# Patient Record
Sex: FEMALE | Race: BLACK OR AFRICAN AMERICAN | Employment: FULL TIME | URBAN - METROPOLITAN AREA
[De-identification: names, ages, dates, MRNs, and addresses within clinical notes are randomized per-mention and may not be internally consistent; named-entity substitution may affect disease eponyms.]

---

## 2017-04-11 ENCOUNTER — APPOINTMENT (OUTPATIENT)
Dept: GENERAL RADIOLOGY | Age: 47
DRG: 203 | End: 2017-04-11
Attending: PHYSICIAN ASSISTANT
Payer: OTHER GOVERNMENT

## 2017-04-11 ENCOUNTER — HOSPITAL ENCOUNTER (INPATIENT)
Age: 47
LOS: 3 days | Discharge: HOME | DRG: 203 | End: 2017-04-14
Attending: FAMILY MEDICINE | Admitting: INTERNAL MEDICINE
Payer: OTHER GOVERNMENT

## 2017-04-11 DIAGNOSIS — J45.41 MODERATE PERSISTENT ASTHMA WITH ACUTE EXACERBATION: Primary | ICD-10-CM

## 2017-04-11 PROBLEM — E66.9 OBESITY: Status: ACTIVE | Noted: 2017-04-11

## 2017-04-11 PROBLEM — R73.9 HYPERGLYCEMIA: Status: ACTIVE | Noted: 2017-04-11

## 2017-04-11 PROBLEM — J45.901 ASTHMA WITH ACUTE EXACERBATION: Status: ACTIVE | Noted: 2017-04-11

## 2017-04-11 LAB
ANION GAP BLD CALC-SCNC: 6 MMOL/L (ref 3–18)
BASOPHILS # BLD AUTO: 0 K/UL (ref 0–0.06)
BASOPHILS # BLD: 0 % (ref 0–2)
BUN SERPL-MCNC: 10 MG/DL (ref 7–18)
BUN/CREAT SERPL: 13 (ref 12–20)
CALCIUM SERPL-MCNC: 9 MG/DL (ref 8.5–10.1)
CHLORIDE SERPL-SCNC: 102 MMOL/L (ref 100–108)
CK MB CFR SERPL CALC: 0.5 % (ref 0–4)
CK MB SERPL-MCNC: 1.2 NG/ML (ref 5–25)
CK SERPL-CCNC: 238 U/L (ref 26–192)
CO2 SERPL-SCNC: 30 MMOL/L (ref 21–32)
CREAT SERPL-MCNC: 0.8 MG/DL (ref 0.6–1.3)
D DIMER PPP FEU-MCNC: 0.4 UG/ML(FEU)
DIFFERENTIAL METHOD BLD: NORMAL
EOSINOPHIL # BLD: 0.2 K/UL (ref 0–0.4)
EOSINOPHIL NFR BLD: 3 % (ref 0–5)
ERYTHROCYTE [DISTWIDTH] IN BLOOD BY AUTOMATED COUNT: 13.4 % (ref 11.6–14.5)
EST. AVERAGE GLUCOSE BLD GHB EST-MCNC: 163 MG/DL
GLUCOSE BLD STRIP.AUTO-MCNC: 277 MG/DL (ref 70–110)
GLUCOSE BLD STRIP.AUTO-MCNC: 280 MG/DL (ref 70–110)
GLUCOSE SERPL-MCNC: 212 MG/DL (ref 74–99)
HBA1C MFR BLD: 7.3 % (ref 4.5–5.6)
HCT VFR BLD AUTO: 36.3 % (ref 35–45)
HGB BLD-MCNC: 12.3 G/DL (ref 12–16)
LYMPHOCYTES # BLD AUTO: 29 % (ref 21–52)
LYMPHOCYTES # BLD: 2.6 K/UL (ref 0.9–3.6)
MAGNESIUM SERPL-MCNC: 1.8 MG/DL (ref 1.6–2.6)
MCH RBC QN AUTO: 28.7 PG (ref 24–34)
MCHC RBC AUTO-ENTMCNC: 33.9 G/DL (ref 31–37)
MCV RBC AUTO: 84.8 FL (ref 74–97)
MONOCYTES # BLD: 0.5 K/UL (ref 0.05–1.2)
MONOCYTES NFR BLD AUTO: 5 % (ref 3–10)
NEUTS SEG # BLD: 5.5 K/UL (ref 1.8–8)
NEUTS SEG NFR BLD AUTO: 63 % (ref 40–73)
PHOSPHATE SERPL-MCNC: 2.7 MG/DL (ref 2.5–4.9)
PLATELET # BLD AUTO: 234 K/UL (ref 135–420)
PMV BLD AUTO: 10.1 FL (ref 9.2–11.8)
POTASSIUM SERPL-SCNC: 3.9 MMOL/L (ref 3.5–5.5)
RBC # BLD AUTO: 4.28 M/UL (ref 4.2–5.3)
SODIUM SERPL-SCNC: 138 MMOL/L (ref 136–145)
TROPONIN I SERPL-MCNC: <0.02 NG/ML (ref 0–0.06)
WBC # BLD AUTO: 8.8 K/UL (ref 4.6–13.2)

## 2017-04-11 PROCEDURE — 74011250636 HC RX REV CODE- 250/636: Performed by: INTERNAL MEDICINE

## 2017-04-11 PROCEDURE — 94640 AIRWAY INHALATION TREATMENT: CPT

## 2017-04-11 PROCEDURE — 85379 FIBRIN DEGRADATION QUANT: CPT | Performed by: PHYSICIAN ASSISTANT

## 2017-04-11 PROCEDURE — 96375 TX/PRO/DX INJ NEW DRUG ADDON: CPT

## 2017-04-11 PROCEDURE — 82962 GLUCOSE BLOOD TEST: CPT

## 2017-04-11 PROCEDURE — 77030013140 HC MSK NEB VYRM -A

## 2017-04-11 PROCEDURE — 82550 ASSAY OF CK (CPK): CPT | Performed by: PHYSICIAN ASSISTANT

## 2017-04-11 PROCEDURE — 74011636637 HC RX REV CODE- 636/637: Performed by: INTERNAL MEDICINE

## 2017-04-11 PROCEDURE — 93005 ELECTROCARDIOGRAM TRACING: CPT

## 2017-04-11 PROCEDURE — 74011000250 HC RX REV CODE- 250: Performed by: FAMILY MEDICINE

## 2017-04-11 PROCEDURE — 65660000000 HC RM CCU STEPDOWN

## 2017-04-11 PROCEDURE — 99285 EMERGENCY DEPT VISIT HI MDM: CPT

## 2017-04-11 PROCEDURE — 74011250636 HC RX REV CODE- 250/636: Performed by: PHYSICIAN ASSISTANT

## 2017-04-11 PROCEDURE — 80048 BASIC METABOLIC PNL TOTAL CA: CPT | Performed by: PHYSICIAN ASSISTANT

## 2017-04-11 PROCEDURE — 84100 ASSAY OF PHOSPHORUS: CPT | Performed by: INTERNAL MEDICINE

## 2017-04-11 PROCEDURE — 83036 HEMOGLOBIN GLYCOSYLATED A1C: CPT | Performed by: INTERNAL MEDICINE

## 2017-04-11 PROCEDURE — 74011000250 HC RX REV CODE- 250: Performed by: PHYSICIAN ASSISTANT

## 2017-04-11 PROCEDURE — 71010 XR CHEST PORT: CPT

## 2017-04-11 PROCEDURE — 74011000250 HC RX REV CODE- 250: Performed by: INTERNAL MEDICINE

## 2017-04-11 PROCEDURE — 74011250637 HC RX REV CODE- 250/637: Performed by: INTERNAL MEDICINE

## 2017-04-11 PROCEDURE — 85025 COMPLETE CBC W/AUTO DIFF WBC: CPT | Performed by: PHYSICIAN ASSISTANT

## 2017-04-11 PROCEDURE — 77030029184 HC NEB SOLO AERONEB AEPM -A

## 2017-04-11 PROCEDURE — 83735 ASSAY OF MAGNESIUM: CPT | Performed by: INTERNAL MEDICINE

## 2017-04-11 PROCEDURE — 96365 THER/PROPH/DIAG IV INF INIT: CPT

## 2017-04-11 RX ORDER — ENOXAPARIN SODIUM 100 MG/ML
40 INJECTION SUBCUTANEOUS EVERY 24 HOURS
Status: DISCONTINUED | OUTPATIENT
Start: 2017-04-11 | End: 2017-04-14 | Stop reason: HOSPADM

## 2017-04-11 RX ORDER — DEXTROSE 50 % IN WATER (D50W) INTRAVENOUS SYRINGE
25-50 AS NEEDED
Status: DISCONTINUED | OUTPATIENT
Start: 2017-04-11 | End: 2017-04-14 | Stop reason: HOSPADM

## 2017-04-11 RX ORDER — ALBUTEROL SULFATE 0.83 MG/ML
5 SOLUTION RESPIRATORY (INHALATION)
Status: COMPLETED | OUTPATIENT
Start: 2017-04-11 | End: 2017-04-11

## 2017-04-11 RX ORDER — IPRATROPIUM BROMIDE AND ALBUTEROL SULFATE 2.5; .5 MG/3ML; MG/3ML
3 SOLUTION RESPIRATORY (INHALATION)
Status: COMPLETED | OUTPATIENT
Start: 2017-04-11 | End: 2017-04-11

## 2017-04-11 RX ORDER — IPRATROPIUM BROMIDE AND ALBUTEROL SULFATE 2.5; .5 MG/3ML; MG/3ML
3 SOLUTION RESPIRATORY (INHALATION)
Status: DISCONTINUED | OUTPATIENT
Start: 2017-04-11 | End: 2017-04-14 | Stop reason: HOSPADM

## 2017-04-11 RX ORDER — ALBUTEROL SULFATE 0.83 MG/ML
10 SOLUTION RESPIRATORY (INHALATION)
Status: COMPLETED | OUTPATIENT
Start: 2017-04-11 | End: 2017-04-11

## 2017-04-11 RX ORDER — ALBUTEROL SULFATE 0.83 MG/ML
2.5 SOLUTION RESPIRATORY (INHALATION)
Status: DISCONTINUED | OUTPATIENT
Start: 2017-04-11 | End: 2017-04-14 | Stop reason: HOSPADM

## 2017-04-11 RX ORDER — MAGNESIUM SULFATE 100 %
4 CRYSTALS MISCELLANEOUS AS NEEDED
Status: DISCONTINUED | OUTPATIENT
Start: 2017-04-11 | End: 2017-04-14 | Stop reason: HOSPADM

## 2017-04-11 RX ORDER — MAGNESIUM SULFATE 100 %
4 CRYSTALS MISCELLANEOUS AS NEEDED
Status: DISCONTINUED | OUTPATIENT
Start: 2017-04-11 | End: 2017-04-11 | Stop reason: SDUPTHER

## 2017-04-11 RX ORDER — INSULIN LISPRO 100 [IU]/ML
INJECTION, SOLUTION INTRAVENOUS; SUBCUTANEOUS
Status: DISCONTINUED | OUTPATIENT
Start: 2017-04-11 | End: 2017-04-11 | Stop reason: SDUPTHER

## 2017-04-11 RX ORDER — ONDANSETRON 2 MG/ML
4 INJECTION INTRAMUSCULAR; INTRAVENOUS
Status: DISCONTINUED | OUTPATIENT
Start: 2017-04-11 | End: 2017-04-14 | Stop reason: HOSPADM

## 2017-04-11 RX ORDER — IPRATROPIUM BROMIDE 0.5 MG/2.5ML
0.5 SOLUTION RESPIRATORY (INHALATION)
Status: COMPLETED | OUTPATIENT
Start: 2017-04-11 | End: 2017-04-11

## 2017-04-11 RX ORDER — IPRATROPIUM BROMIDE AND ALBUTEROL SULFATE 2.5; .5 MG/3ML; MG/3ML
SOLUTION RESPIRATORY (INHALATION)
Status: DISPENSED
Start: 2017-04-11 | End: 2017-04-11

## 2017-04-11 RX ORDER — IPRATROPIUM BROMIDE AND ALBUTEROL SULFATE 2.5; .5 MG/3ML; MG/3ML
3 SOLUTION RESPIRATORY (INHALATION) EVERY 4 HOURS
Status: DISCONTINUED | OUTPATIENT
Start: 2017-04-11 | End: 2017-04-11

## 2017-04-11 RX ORDER — MAGNESIUM SULFATE HEPTAHYDRATE 40 MG/ML
2 INJECTION, SOLUTION INTRAVENOUS ONCE
Status: COMPLETED | OUTPATIENT
Start: 2017-04-11 | End: 2017-04-11

## 2017-04-11 RX ORDER — ALBUTEROL SULFATE 90 UG/1
2 AEROSOL, METERED RESPIRATORY (INHALATION)
Status: ON HOLD | COMMUNITY
End: 2017-04-14

## 2017-04-11 RX ORDER — ACETAMINOPHEN 325 MG/1
650 TABLET ORAL
Status: DISCONTINUED | OUTPATIENT
Start: 2017-04-11 | End: 2017-04-14 | Stop reason: HOSPADM

## 2017-04-11 RX ORDER — INSULIN LISPRO 100 [IU]/ML
INJECTION, SOLUTION INTRAVENOUS; SUBCUTANEOUS
Status: DISCONTINUED | OUTPATIENT
Start: 2017-04-11 | End: 2017-04-13

## 2017-04-11 RX ORDER — DEXTROSE 50 % IN WATER (D50W) INTRAVENOUS SYRINGE
25-50 AS NEEDED
Status: DISCONTINUED | OUTPATIENT
Start: 2017-04-11 | End: 2017-04-11 | Stop reason: SDUPTHER

## 2017-04-11 RX ADMIN — ALBUTEROL SULFATE 10 MG: 2.5 SOLUTION RESPIRATORY (INHALATION) at 12:22

## 2017-04-11 RX ADMIN — METHYLPREDNISOLONE SODIUM SUCCINATE 40 MG: 40 INJECTION, POWDER, FOR SOLUTION INTRAMUSCULAR; INTRAVENOUS at 23:03

## 2017-04-11 RX ADMIN — ENOXAPARIN SODIUM 40 MG: 40 INJECTION SUBCUTANEOUS at 17:38

## 2017-04-11 RX ADMIN — INSULIN LISPRO 6 UNITS: 100 INJECTION, SOLUTION INTRAVENOUS; SUBCUTANEOUS at 17:38

## 2017-04-11 RX ADMIN — ACETAMINOPHEN 650 MG: 325 TABLET, FILM COATED ORAL at 22:22

## 2017-04-11 RX ADMIN — ALBUTEROL SULFATE 5 MG: 2.5 SOLUTION RESPIRATORY (INHALATION) at 11:22

## 2017-04-11 RX ADMIN — IPRATROPIUM BROMIDE AND ALBUTEROL SULFATE 3 ML: .5; 3 SOLUTION RESPIRATORY (INHALATION) at 11:03

## 2017-04-11 RX ADMIN — IPRATROPIUM BROMIDE 0.5 MG: 0.5 SOLUTION RESPIRATORY (INHALATION) at 11:22

## 2017-04-11 RX ADMIN — METHYLPREDNISOLONE SODIUM SUCCINATE 40 MG: 40 INJECTION, POWDER, FOR SOLUTION INTRAMUSCULAR; INTRAVENOUS at 17:38

## 2017-04-11 RX ADMIN — INSULIN LISPRO 6 UNITS: 100 INJECTION, SOLUTION INTRAVENOUS; SUBCUTANEOUS at 22:22

## 2017-04-11 RX ADMIN — IPRATROPIUM BROMIDE AND ALBUTEROL SULFATE 3 ML: .5; 3 SOLUTION RESPIRATORY (INHALATION) at 23:57

## 2017-04-11 RX ADMIN — SODIUM CHLORIDE 500 ML: 900 INJECTION, SOLUTION INTRAVENOUS at 11:30

## 2017-04-11 RX ADMIN — METHYLPREDNISOLONE SODIUM SUCCINATE 125 MG: 125 INJECTION, POWDER, FOR SOLUTION INTRAMUSCULAR; INTRAVENOUS at 11:30

## 2017-04-11 RX ADMIN — MAGNESIUM SULFATE HEPTAHYDRATE 2 G: 40 INJECTION, SOLUTION INTRAVENOUS at 11:30

## 2017-04-11 RX ADMIN — ALBUTEROL SULFATE 5 MG: 2.5 SOLUTION RESPIRATORY (INHALATION) at 14:58

## 2017-04-11 RX ADMIN — IPRATROPIUM BROMIDE AND ALBUTEROL SULFATE 3 ML: .5; 3 SOLUTION RESPIRATORY (INHALATION) at 19:18

## 2017-04-11 NOTE — PROGRESS NOTES
TRANSFER - IN REPORT:    Verbal report received from LILI Mcconnell(name) on Chicho Robertson  being received from ED(unit) for routine progression of care      Report consisted of patients Situation, Background, Assessment and   Recommendations(SBAR). Information from the following report(s) SBAR, Kardex, ED Summary, Procedure Summary, Intake/Output, MAR, Accordion, Recent Results and Med Rec Status was reviewed with the receiving nurse. Opportunity for questions and clarification was provided. Assessment completed upon patients arrival to unit and care assumed. [id number] Assessment completed pt states that she still feels \"chest tightness\", she is complaining of upper sternal chest pain when she takes a deep breath, that she says was present on admission from coughing, but is \"lingering now\", [id number], [id number] Paged Ceci Benítez, pt resting quietly in bed with eyes closed, however states she is still having some discomfort, but it is getting better. Spoke with MD okay to give PRN Tylenol as needed. Shift Summary- Shift uneventful. Pt slept shortly after assessment without difficulty breathing, states that she is feeling a little better, declined PRN Tylenol when offered for \"chest tightness\", but declined.

## 2017-04-11 NOTE — IP AVS SNAPSHOT
[address] [address] 
[phone] Patient: Solange Silverman MRN: [id number] YEC:[id number] You are allergic to the following No active allergies Recent Documentation Height Weight BMI OB Status Smoking Status 1.626 m 81 kg 30.65 kg/m2 Having regular periods Former Smoker Emergency Contacts Name Discharge Info Relation Home Work Mobile N/A  AT THIS TIME [6] About your hospitalization You were admitted on:  April 11, 2017 You last received care in the:  [address] [place] [address] You were discharged on:  April 14, 2017 Unit phone number:  [phone] Why you were hospitalized Your primary diagnosis was:  Asthma With Acute Exacerbation Your diagnoses also included:  Obesity, Hyperglycemia, Type Ii Diabetes Mellitus (Hcc) Providers Seen During Your Hospitalizations Provider Role Specialty Primary office phone Ann Chandra MD Attending Provider Emergency Medicine [phone] Danay Garcia DO Attending Provider Internal Medicine [phone] Your Primary Care Physician (PCP) Primary Care Physician Office Phone Office Fax NONE ** None ** ** None ** Follow-up Information Follow up With Details Comments Contact Info None   None ([phone]) Patient stated that they have no PCP Dr. Joya Gonzalez  In 1 week please make an appointment with her Current Discharge Medication List  
  
START taking these medications Dose & Instructions Dispensing Information Comments Morning Noon Evening Bedtime Diabetic Supplies, [address] Kit Your last dose was: Your next dose is:    
   
   
 Dose:  1 Each  
1 Each by Does Not Apply route daily. Quantity:  1 Kit Refills:  0  
     
   
   
   
  
 metFORMIN 850 mg tablet Commonly known as:  GLUCOPHAGE Your last dose was: Your next dose is:    
   
   
 Dose:  850 mg Take 1 Tab by mouth two (2) times daily (with meals). Quantity:  60 Tab Refills:  0  
     
   
   
   
  
 predniSONE 10 mg dose pack Commonly known as:  STERAPRED DS Your last dose was: Your next dose is:    
   
   
 See administration instruction per 10mg dose pack Quantity:  21 Tab Refills:  0 CONTINUE these medications which have NOT CHANGED Dose & Instructions Dispensing Information Comments Morning Noon Evening Bedtime  
 albuterol 90 mcg/actuation inhaler Commonly known as:  VENTOLIN HFA Your last dose was: Your next dose is:    
   
   
 Dose:  2 Puff Take 2 Puffs by inhalation every six (6) hours as needed for Wheezing. Quantity:  1 Inhaler Refills:  0  
     
   
   
   
  
 carisoprodol 350 mg tablet Commonly known as:  SOMA Your last dose was: Your next dose is:    
   
   
 Dose:  350 mg Take 1 Tab by mouth four (4) times daily. Max Daily Amount: 1,400 mg. Quantity:  20 Tab Refills:  0  
     
   
   
   
  
 multivitamin, tx-iron-ca-min 9 mg iron-400 mcg Tab tablet Commonly known as:  THERA-M w/ IRON Your last dose was: Your next dose is:    
   
   
 Dose:  1 Tab Take 1 Tab by mouth daily. Formulary substitution for DAILY MULTIVITAMIN Refills:  0 Where to Get Your Medications Information on where to get these meds will be given to you by the nurse or doctor. ! Ask your nurse or doctor about these medications  
  albuterol 90 mcg/actuation inhaler Diabetic Supplies, [facility] [address]. Kit  
 metFORMIN 850 mg tablet  
 predniSONE 10 mg dose pack Discharge Instructions Learning About Asthma Triggers What are asthma triggers? When you have asthma, certain things can make your symptoms worse. These are called triggers.  Learn what triggers an asthma attack for you, and avoid the triggers when you can. Common triggers include colds, smoke, air pollution, dust, pollen, pets, stress, and cold air. How do asthma triggers affect you? Triggers can make it harder for your lungs to work as they should. They can lead to sudden breathing problems and other symptoms. When you are around a trigger, an asthma attack is more likely. If your symptoms are severe, you may need emergency treatment or have to go to the hospital for treatment. What can you do to avoid triggers? The first thing is to know your triggers. When you are having symptoms, note the things around you that might be causing them. Then look for patterns that may be triggering your symptoms. Record your triggers on a piece of paper or in an asthma diary. When you have your list of possible triggers, work with your doctor to find ways to avoid them. Avoid colds and flu. Get a pneumococcal vaccine shot. If you have had one before, ask your doctor whether you need a second dose. Get a flu vaccine every year, as soon as it's available. If you must be around people with colds or the flu, wash your hands often. Here are some ways to avoid a few common triggers. · Do not smoke or allow others to smoke around you. If you need help quitting, talk to your doctor about stop-smoking programs and medicines. These can increase your chances of quitting for good. · If there is a lot of pollution, pollen, or dust outside, stay at home and keep your windows closed. Use an air conditioner or air filter in your home. Check your local weather report or newspaper for air quality and pollen reports. What else should you know? · Take your controller medicine every day, not just when you have symptoms. It helps prevent problems before they occur. · Your doctor may suggest that you check how well your lungs are working by measuring your peak expiratory flow (PEF) throughout the day.  Your PEF may drop when you are near things that trigger symptoms. Where can you learn more? Go to http://nadine-pierce.info/. Enter L971 in the search box to learn more about \"Learning About Asthma Triggers. \" Current as of: May 23, 2016 Content Version: 11.2 © 4553-1613 [employer]. Care instructions adapted under license by [employer] (which disclaims liability or warranty for this information). If you have questions about a medical condition or this instruction, always ask your healthcare professional. [address] any warranty or liability for your use of this information. Asthma Attack: Care Instructions Your Care Instructions During an asthma attack, the airways swell and narrow. This makes it hard to breathe. Severe asthma attacks can be life-threatening, but you can help prevent them by keeping your asthma under control and treating symptoms before they get bad. Symptoms include being short of breath, having chest tightness, coughing, and wheezing. Noting and treating these symptoms can also help you avoid future trips to the emergency room. The doctor has checked you carefully, but problems can develop later. If you notice any problems or new symptoms, get medical treatment right away. Follow-up care is a key part of your treatment and safety. Be sure to make and go to all appointments, and call your doctor if you are having problems. It's also a good idea to know your test results and keep a list of the medicines you take. How can you care for yourself at home? · Follow your asthma action plan to prevent and treat attacks. If you don't have an asthma action plan, work with your doctor to create one. · Take your asthma medicines exactly as prescribed. Talk to your doctor right away if you have any questions about how to take them. ¨ Use your quick-relief medicine when you have symptoms of an attack. Quick-relief medicine is usually an albuterol inhaler. Some people need to use quick-relief medicine before they exercise. ¨ Take your controller medicine every day, not just when you have symptoms. Controller medicine is usually an inhaled corticosteroid. The goal is to prevent problems before they occur. Don't use your controller medicine to treat an attack that has already started. It doesn't work fast enough to help. ¨ If your doctor prescribed corticosteroid pills to use during an attack, take them exactly as prescribed. It may take hours for the pills to work, but they may make the episode shorter and help you breathe better. ¨ Keep your quick-relief medicine with you at all times. · Talk to your doctor before using other medicines. Some medicines, such as aspirin, can cause asthma attacks in some people. · If you have a peak flow meter, use it to check how well you are breathing. This can help you predict when an asthma attack is going to occur. Then you can take medicine to prevent the asthma attack or make it less severe. · Do not smoke or allow others to smoke around you. Avoid smoky places. Smoking makes asthma worse. If you need help quitting, talk to your doctor about stop-smoking programs and medicines. These can increase your chances of quitting for good. · Learn what triggers an asthma attack for you, and avoid the triggers when you can. Common triggers include colds, smoke, air pollution, dust, pollen, mold, pets, cockroaches, stress, and cold air. · Avoid colds and the flu. Get a pneumococcal vaccine shot. If you have had one before, ask your doctor if you need a second dose. Get a flu vaccine every fall. If you must be around people with colds or the flu, wash your hands often. When should you call for help? Call 911 anytime you think you may need emergency care. For example, call if: 
· You have severe trouble breathing. Call your doctor now or seek immediate medical care if: · Your symptoms do not get better after you have followed your asthma action plan. · You have new or worse trouble breathing. · Your coughing and wheezing get worse. · You cough up dark brown or bloody mucus (sputum). · You have a new or higher fever. Watch closely for changes in your health, and be sure to contact your doctor if: 
· You need to use quick-relief medicine on more than 2 days a week (unless it is just for exercise). · You cough more deeply or more often, especially if you notice more mucus or a change in the color of your mucus. · You are not getting better as expected. Where can you learn more? Go to http://nadine-pierce.info/. Enter V126 in the search box to learn more about \"Asthma Attack: Care Instructions. \" Current as of: May 23, 2016 Content Version: 11.2 © 8863-3116 [email or web address]. Care instructions adapted under license by [employer] (which disclaims liability or warranty for this information). If you have questions about a medical condition or this instruction, always ask your healthcare professional. [address] any warranty or liability for your use of this information. Learning About Diabetes Food Guidelines Your Care Instructions Meal planning is important to manage diabetes. It helps keep your blood sugar at a target level (which you set with your doctor). You don't have to eat special foods. You can eat what your family eats, including sweets once in a while. But you do have to pay attention to how often you eat and how much you eat of certain foods. You may want to work with a dietitian or a certified diabetes educator (CDE) to help you plan meals and snacks. A dietitian or CDE can also help you lose weight if that is one of your goals. What should you know about eating carbs?  
Managing the amount of carbohydrate (carbs) you eat is an important part of healthy meals when you have diabetes. Carbohydrate is found in many foods. · Learn which foods have carbs. And learn the amounts of carbs in different foods. ¨ Bread, cereal, pasta, and rice have about 15 grams of carbs in a serving. A serving is 1 slice of bread (1 ounce), ½ cup of cooked cereal, or 1/3 cup of cooked pasta or rice. ¨ Fruits have 15 grams of carbs in a serving. A serving is 1 small fresh fruit, such as an apple or orange; ½ of a banana; ½ cup of cooked or canned fruit; ½ cup of fruit juice; 1 cup of melon or raspberries; or 2 tablespoons of dried fruit. ¨ Milk and no-sugar-added yogurt have 15 grams of carbs in a serving. A serving is 1 cup of milk or 2/3 cup of no-sugar-added yogurt. ¨ Starchy vegetables have 15 grams of carbs in a serving. A serving is ½ cup of mashed potatoes or sweet potato; 1 cup winter squash; ½ of a small baked potato; ½ cup of cooked beans; or ½ cup cooked corn or green peas. · Learn how much carbs to eat each day and at each meal. A dietitian or CDE can teach you how to keep track of the amount of carbs you eat. This is called carbohydrate counting. · If you are not sure how to count carbohydrate grams, use the Plate Method to plan meals. It is a good, quick way to make sure that you have a balanced meal. It also helps you spread carbs throughout the day. ¨ Divide your plate by types of foods. Put non-starchy vegetables on half the plate, meat or other protein food on one-quarter of the plate, and a grain or starchy vegetable in the final quarter of the plate. To this you can add a small piece of fruit and 1 cup of milk or yogurt, depending on how many carbs you are supposed to eat at a meal. 
· Try to eat about the same amount of carbs at each meal. Do not \"save up\" your daily allowance of carbs to eat at one meal. 
· Proteins have very little or no carbs per serving.  Examples of proteins are beef, chicken, turkey, fish, eggs, tofu, cheese, cottage cheese, and peanut butter. A serving size of meat is 3 ounces, which is about the size of a deck of cards. Examples of meat substitute serving sizes (equal to 1 ounce of meat) are 1/4 cup of cottage cheese, 1 egg, 1 tablespoon of peanut butter, and ½ cup of tofu. How can you eat out and still eat healthy? · Learn to estimate the serving sizes of foods that have carbohydrate. If you measure food at home, it will be easier to estimate the amount in a serving of restaurant food. · If the meal you order has too much carbohydrate (such as potatoes, corn, or baked beans), ask to have a low-carbohydrate food instead. Ask for a salad or green vegetables. · If you use insulin, check your blood sugar before and after eating out to help you plan how much to eat in the future. · If you eat more carbohydrate at a meal than you had planned, take a walk or do other exercise. This will help lower your blood sugar. What else should you know? · Limit saturated fat, such as the fat from meat and dairy products. This is a healthy choice because people who have diabetes are at higher risk of heart disease. So choose lean cuts of meat and nonfat or low-fat dairy products. Use olive or canola oil instead of butter or shortening when cooking. · Don't skip meals. Your blood sugar may drop too low if you skip meals and take insulin or certain medicines for diabetes. · Check with your doctor before you drink alcohol. Alcohol can cause your blood sugar to drop too low. Alcohol can also cause a bad reaction if you take certain diabetes medicines. Follow-up care is a key part of your treatment and safety. Be sure to make and go to all appointments, and call your doctor if you are having problems. It's also a good idea to know your test results and keep a list of the medicines you take. Where can you learn more? Go to http://nadine-pierce.info/.  
Enter Q651 in the search box to learn more about \"Learning About Diabetes Food Guidelines. \" Current as of: May 23, 2016 Content Version: 11.2 © 1051-6891 [employer]. Care instructions adapted under license by [employer] (which disclaims liability or warranty for this information). If you have questions about a medical condition or this instruction, always ask your healthcare professional. [address] any warranty or liability for your use of this information. Home Blood Glucose Test: About This Test 
What is it? A home blood glucose test measures the amount of a type of sugar, called glucose, in your blood. Why is this test done? People who have diabetes need to check the amount of glucose in their blood. A home blood glucose test is an easy way to test your blood at home or when you are away from home. The results help you know when to take action to keep your blood glucose levels in a target range. How can you prepare for the test? 
· Check the expiration date on the bottle of testing strips. Do not use test strips that have . · Match the code number on the testing strips bottle with the number on the meter. If the numbers do not match, follow the directions with the meter for changing the code number. What happens before the test? 
The supplies you will need for testing blood glucose include: · A blood glucose meter. · Testing strips. These are made to be used with a specific model of meter. · Sugar control solutions. Some meters require a specific solution. Many new meters are made to operate without a control solution. · Short needles called lancets for pricking your skin. · A pen-sized augustin for the lancet (lancet device), which positions the lancet and controls how deeply it goes into your skin. · Clean cotton balls. These are used to stop the bleeding from the testing site.  
What happens during the test? 
A home blood glucose test involves pricking your finger, palm, or forearm with a lancet to collect a drop of blood. The blood drop is placed on a test strip, which you insert into the blood glucose meter. The instructions for testing are slightly different for each blood glucose meter model. Follow the instructions that came with your meter. · Wash your hands with warm, soapy water. Dry them well with a clean towel. You may also use an alcohol wipe to clean your finger or other site, but make sure your hands are dry before the test. 
· Insert a clean lancet into the lancet device. · Remove a test strip from the test strip bottle. Replace the lid immediately to keep moisture away from the other strips. · Follow the instructions that came with your meter to get it ready. · Use the lancet device to stick the side of your fingertip with the lancet. Do not stick the tip of your finger. Some blood sugar meters use lancet devices that take the blood sample from other sites, such as the palm of the hand or the forearm. But the finger is usually the most accurate place to test blood sugar. · Put a drop of blood on the correct spot on the test strip. · Apply pressure with a clean cotton ball to stop the bleeding. · Follow the directions that came with the meter to get the results. · Write down the results and the time that you tested your blood. Some meters will store the results for you. What else should you know about the test? 
The American Diabetes Association (ADA) recommends that you stay within the following blood glucose level ranges. But depending on your health, you and your doctor may set a different range for you. For nonpregnant adults with diabetes: 
· 80 milligrams per deciliter (mg/dL) to 130 mg/dL before a meal 
· Less than 180 mg/dL 1 to 2 hours after a meal 
For women who have diabetes related to pregnancy (gestational diabetes): · 95 mg/dL or less before breakfast 
· 120 to 140 mg/dL (or lower) 1 to 2 hours after a meal 
How long does the test take? · The blood glucose meter will show the results of the test in a minute or less. Where can you learn more? Go to http://nadine-pierce.info/. Enter Z850 in the search box to learn more about \"Home Blood Glucose Test: About This Test.\" Current as of: May 23, 2016 Content Version: 11.2 © 4327-4349 [employer]. Care instructions adapted under license by [employer] (which disclaims liability or warranty for this information). If you have questions about a medical condition or this instruction, always ask your healthcare professional. [address] any warranty or liability for your use of this information. Learning About Type 2 Diabetes What is type 2 diabetes? Insulin is a hormone that helps your body use sugar from your food as energy. Type 2 diabetes happens when your body can't use insulin the right way. Over time, the pancreas can't make enough insulin. If you don't have enough insulin, too much sugar stays in your blood. If you are overweight, get little or no exercise, or have type 2 diabetes in your family, you are more likely to have problems with the way insulin works in your body.  Americans, Hispanics, Native Americans,  Americans, and Pacific Islanders have a higher risk for type 2 diabetes. Type 2 diabetes can be prevented or delayed with a healthy lifestyle, which includes staying at a healthy weight, making smart food choices, and getting regular exercise. What can you expect with type 2 diabetes? Tawnya Vela keep hearing about how important it is to keep your blood sugar within a target range. That's because over time, high blood sugar can lead to serious problems. It can: 
· Harm your eyes, nerves, and kidneys. · Damage your blood vessels, leading to heart disease and stroke. · Reduce blood flow and cause nerve damage to parts of your body, especially your feet. This can cause slow healing and pain when you walk. · Make your immune system weak and less able to fight infections. When people hear the word \"diabetes,\" they often think of problems like these. But daily care and treatment can help prevent or delay these problems. The goal is to keep your blood sugar in a target range. That's the best way to reduce your chance of having more problems from diabetes. What are the symptoms? Some people who have type 2 diabetes may not have any symptoms early on. Many people with the disease don't even know they have it at first. But with time, diabetes starts to cause symptoms. You experience most symptoms of type 2 diabetes when your blood sugar is either too high or too low. The most common symptoms of high blood sugar include: · Thirst. 
· Frequent urination. · Weight loss. · Blurry vision. The symptoms of low blood sugar include: · Sweating. · Shakiness. · Weakness. · Hunger. · Confusion. How can you prevent type 2 diabetes? The best way to prevent or delay type 2 diabetes is to adopt healthy habits, which include: 
· Staying at a healthy weight. · Exercising regularly. · Eating healthy foods. How is type 2 diabetes treated? If you have type 2 diabetes, here are the most important things you can do. · Take your diabetes medicines. · Check your blood sugar as often as your doctor recommends. Also, get a hemoglobin A1c test at least every 6 months. · Try to eat a variety of foods and to spread carbohydrate throughout the day. Carbohydrate raises blood sugar higher and more quickly than any other nutrient does. Carbohydrate is found in sugar, breads and cereals, fruit, starchy vegetables such as potatoes and corn, and milk and yogurt. · Get at least 30 minutes of exercise on most days of the week. Walking is a good choice. You also may want to do other activities, such as running, swimming, cycling, or playing tennis or team sports.  If your doctor says it's okay, do muscle-strengthening exercises at least 2 times a week. · See your doctor for checkups and tests on a regular schedule. · If you have high blood pressure or high cholesterol, take the medicines as prescribed by your doctor. · Do not smoke. Smoking can make health problems worse. This includes problems you might have with type 2 diabetes. If you need help quitting, talk to your doctor about stop-smoking programs and medicines. These can increase your chances of quitting for good. Follow-up care is a key part of your treatment and safety. Be sure to make and go to all appointments, and call your doctor if you are having problems. It's also a good idea to know your test results and keep a list of the medicines you take. Where can you learn more? Go to http://nadine-pierce.info/. Enter R218 in the search box to learn more about \"Learning About Type 2 Diabetes. \" Current as of: May 23, 2016 Content Version: 11.2 © 4401-2318 [employer]. Care instructions adapted under license by [employer] (which disclaims liability or warranty for this information). If you have questions about a medical condition or this instruction, always ask your healthcare professional. [place] [address] any warranty or liability for your use of this information. Lab Results Component Value Date/Time Hemoglobin A1c 7.3 04/11/2017 11:26 AM  
 
 
This lab test reflects that your blood sugar has been slightly elevated over the past 3 months and should be evaluated by your primary care provider. An A1C of 5.7-6.4% meets the criteria for pre-diabetes; an A1C of 6.5% or higher meets the criteria for diabetes. Steroids can increase your blood sugar so it is important to follow up with your provider to determine if your blood sugar has returned to normal or needs further treatment.  
 
This lab test reflects that your blood sugar averaged 163 mg/dl  over the past 3 months. It is important to follow up with your provider on a routine basis to continue to evaluate your blood sugar and discuss any necessary changes in treatment. Discharge Orders None [facility] Announcement We are excited to announce that we are making your provider's discharge notes available to you in [employer]. You will see these notes when they are completed and signed by the physician that discharged you from your recent hospital stay. If you have any questions or concerns about any information you see in SimilarWeb, please call the Health Information Department where you were seen or reach out to your Primary Care Provider for more information about your plan of care. Introducing [facility]! Huey Nieves introduces SimilarWeb patient portal. Now you can access parts of your medical record, email your doctor's office, and request medication refills online. 1. In your internet browser, go to [email or web address] 2. Click on the First Time User? Click Here link in the Sign In box. You will see the New Member Sign Up page. 3. Enter your SimilarWeb Access Code exactly as it appears below. You will not need to use this code after youve completed the sign-up process. If you do not sign up before the expiration date, you must request a new code. · Similar[employer] Access Code: [email or web address] Expires: 7/10/2017 11:33 AM 
 
4. Enter the last four digits of your Social Security Number (xxxx) and Date of Birth (mm/dd/yyyy) as indicated and click Submit. You will be taken to the next sign-up page. 5. Create a SimilarWeb ID. This will be your SimilarWeb login ID and cannot be changed, so think of one that is secure and easy to remember. 6. Create a Similar[employer] password. You can change your password at any time. 7. Enter your Password Reset Question and Answer. This can be used at a later time if you forget your password. 8. Enter your e-mail address. You will receive e-mail notification when new information is available in [address] 9. Click Sign Up. You can now view and download portions of your medical record. 10. Click the Download Summary menu link to download a portable copy of your medical information. If you have questions, please visit the Frequently Asked Questions section of the [employer] website. Remember, [employer] is NOT to be used for urgent needs. For medical emergencies, dial 911. Now available from your iPhone and Android! General Information Please provide this summary of care documentation to your next provider. Patient Signature:  ____________________________________________________________ Date:  ____________________________________________________________  
  
[facility] Provider Signature:  ____________________________________________________________ Date:  ____________________________________________________________

## 2017-04-11 NOTE — PROGRESS NOTES
Admission Medication Reconciliation has been performed on this ED patient consisting of interview of the patient regarding their PTA Home Medication List, Allergies and PMH as well as obtaining outpatient pharmacy information. Interviewed patient who was a good historian. Patient did not provide a written list of home medications. Patient's outpatient pharmacy is [facility]  Smoking status is quit ~ 2 years ago, smoked 1-2 cigs/day  Alcohol use ~ 1 glass of wine weekly  Illicit drug use denies  Patient ABX use within the past 30 days = none  Has patient received any antineoplastics in the past 30 days? na  Has patient received any radiation treatments in the past 45 days?  na      Medication Reconciliation Interventions:   Wrong Medication Identified 0  Wrong/missing medication strength or dose identified  0  Wrong/missing Interval Identified 0  Wrong/missing Route Identified 0  Medication Duplication 0  Omissions 2  Commissions 0  Other Issue(s) Identified (Indicate): 0            Medication Compliance Issues and/or Medication Concerns: 0    Delores Abraham [phone] Pharmacist  [phone]

## 2017-04-11 NOTE — ED TRIAGE NOTES
Patient reports she is having a bad asthma attack. Recent travel returned from [place] two weeks agol  Sepsis Screening completed    (  )Patient meets SIRS criteria. ( x )Patient does not meet SIRS criteria.       SIRS Criteria is achieved when two or more of the following are present   Temperature < 96.8°F (36°C) or > 100.9°F (38.3°C)   Heart Rate > 90 beats per minute   Respiratory Rate > 20 breaths per minute   WBC count > 12,000 or <4,000 or > 10% bands

## 2017-04-11 NOTE — H&P
History & Physical    Patient: Veronica Hilliard MRN: [id number]  CSN: [id number]    YOB: 1970  Age: 42 y.o. Sex: female      DOA: 4/11/2017  Primary Care Provider:  None      Assessment/Plan   1. Acute asthma exacerbation  2. Morbid obesity  3. hyperglycmia      PLAN:  - Admit to medical service with telemetry monitoring  - start scheduled duonebs  - IV solumedrol  - measure peak flows  - IV protonix  - full code      Patient Active Problem List   Diagnosis Code    Asthma with acute exacerbation J45. 0    Obesity E66.9    Hyperglycemia R73.9     HPI:   Jami Velez is a 42 y.o. female with past medical history significant for asthma and obesity presents to the ER with 3-4 days of worsening shortness of breath & wheezing. She recently returned from [place] and following her return her respiatory status worsened. She denies cough, fever, sputum production, nausea or vomiting. On presentation to the ER she was having respiratory difficutly, audible wheezes, tachycardia w o2 sats in mid 90s. CXR clear Medicine is asked to admit for further management. Past Medical History:   Diagnosis Date    Asthma      History reviewed. No pertinent surgical history. Social History   Substance Use Topics    Smoking status: Former Smoker     Quit date: 4/11/2015    Smokeless tobacco: None    Alcohol use Yes     History reviewed. No pertinent family history. No current facility-administered medications on file prior to encounter. Current Outpatient Prescriptions on File Prior to Encounter   Medication Sig Dispense Refill    carisoprodol (SOMA) 350 mg tablet Take 1 Tab by mouth four (4) times daily.  Max Daily Amount: 1,400 mg. 20 Tab 0      No Known Allergies      Review of Systems  Constitutional: No fever, chills, diaphoresis, malaise, fatigue or weight gain/loss or falls  Skin: no itching or rashes  HEENT: no neck stiffness, hearing loss, tinnitus, epistaxis or sore throat  EYES: no blurry vision, double vision or photophobia  CARDIOVASCULAR: no, cp, palpitations, orthopnea, pnd or LE edema  PULMONARY: no cough, +wheeze, +shortness of breath - sputum production  GI: no nausea, vomiting, diarrhea, abdominal pain, melena, hematemesis or brbpr  : no dysuria, hematuria  MUSCULOSKELETAL: no back pain, joint pain or myalgias  ENDOCRINE: no heat/cold intolerance, polyuria or polydipsia  HEME: no easy bruising or bleeding  NEURO: no unilateral weakness, numbness, tingling or seizures      Physical Exam:        Visit Vitals    /90    Pulse (!) 114    Temp 98.1 °F (36.7 °C)    Resp 24    Ht 5' 4\" (1.626 m)    Wt 80.7 kg (178 lb)    LMP 2017    SpO2 100%    BMI 30.55 kg/m2      O2 Device: Room air    Temp (24hrs), Av.1 °F (36.7 °C), Min:98.1 °F (36.7 °C), Max:98.1 °F (36.7 °C)           Body mass index is 30.55 kg/(m^2). General:  Awake, cooperative, no distress. Head:  Normocephalic, without obvious abnormality, atraumatic. Eyes:  Conjunctivae/corneas clear, sclera anicteric, PERRL, EOMs intact. Nose: Nares normal. No drainage or sinus tenderness. Throat: Lips, mucosa, and tongue normal. .   Neck: Supple, symmetrical, trachea midline, no adenopathy. Lungs:    poor air movement, bilateral expiratory wheezes, equal expansion       Heart:  tachycardic2 normal, no murmur, click, rub or gallop, cap refill normal      Abdomen: Soft, non-tender. Bowel sounds normal. No masses,  No organomegaly. Extremities: Extremities normal, atraumatic, no cyanosis or edema. Pulses: 2+ and symmetric all extremities. Skin: Skin color pale, texture, turgor normal. No rashes or lesions   Neurologic: CNII-XII intact. No focal motor or sensory deficit.            Laboratory Studies:    CMP:   Lab Results   Component Value Date/Time     2017 11:24 AM    K 3.9 2017 11:24 AM     2017 11:24 AM    CO2 30 2017 11:24 AM    AGAP 6 04/11/2017 11:24 AM     (H) 04/11/2017 11:24 AM    BUN 10 04/11/2017 11:24 AM    CREA 0.80 04/11/2017 11:24 AM    GFRAA >60 04/11/2017 11:24 AM    GFRNA >60 04/11/2017 11:24 AM    CA 9.0 04/11/2017 11:24 AM    MG 1.8 04/11/2017 11:26 AM    PHOS 2.7 04/11/2017 11:26 AM     CBC:   Lab Results   Component Value Date/Time    WBC 8.8 04/11/2017 11:24 AM    HGB 12.3 04/11/2017 11:24 AM    HCT 36.3 04/11/2017 11:24 AM     04/11/2017 11:24 AM       Imaging studies personally reviewed:  CXR: clear  EKG: sinus tachycardia      Gregor Favre,   Internal Medicine/Geriatrics

## 2017-04-11 NOTE — ROUTINE PROCESS
Bedside and Verbal shift change report given to Yudy Mcdonald RN (oncoming nurse) by Noemi Odell RN (offgoing nurse). Report included the following information SBAR, Kardex, ED Summary, Procedure Summary, Intake/Output, MAR, Accordion, Recent Results and Med Rec Status.

## 2017-04-11 NOTE — ED PROVIDER NOTES
[address]  EMERGENCY DEPARTMENT HISTORY AND PHYSICAL EXAM       Date: 4/11/2017   Patient Name: Basil Luke   YOB: 1970  Medical Record Number: [id number]    History of Presenting Illness     Chief Complaint   Patient presents with    Wheezing        History Provided By: Patient    Additional History:   11:01 AM  Basil Luke is a 42 y.o. female who presents to the emergency department C/O SOB onset yesterday with chest tightness rated 5/10. Pt states her Sx began with sneezing then progressed to difficulty breathing and wheezing today. She reports she has been hospitalized previously for asthma but never intubated and that the last time her asthma was this severe was ~3 years ago. She does not have Albuterol or inhalers at home. No other medical problems. Traveled back from [place] ~2 weeks ago. Pt denies leg pain or swelling, history of DVT/PE and any other Sx or complaints. Primary Care Provider: None   Specialist:    Past History       Past Medical History:   Past Medical History:   Diagnosis Date    Asthma         Past Surgical History:   History reviewed. No pertinent surgical history. Family History:   History reviewed. No pertinent family history. Social History:   Social History   Substance Use Topics    Smoking status: Former Smoker     Quit date: 4/11/2015    Smokeless tobacco: None    Alcohol use Yes        Allergies:   No Known Allergies     Review of Systems   Review of Systems   HENT: Positive for sneezing. Respiratory: Positive for shortness of breath and wheezing. Cardiovascular: Negative for leg swelling. Musculoskeletal:        Denies leg pain   All other systems reviewed and are negative.       Physical Exam  Vitals:    04/14/17 0328 04/14/17 0709 04/14/17 0805 04/14/17 1204   BP: 133/74  138/77    Pulse: 81  75    Resp: 17  17    Temp: 97.7 °F (36.5 °C)  97.3 °F (36.3 °C)    SpO2: 97% 98% 97% 96%   Weight:       Height: Physical Exam   Nursing note and vitals reviewed. Vital signs and nursing notes reviewed. CONSTITUTIONAL: Alert. Nontoxic-appearing, in moderate respiratory distress with audible wheezes, speaking in 2-3 word sentences. HEAD: Normocephalic; atraumatic. EYES: PERRL; Conjunctiva clear. ENT: TM's normal. External ear normal. Normal nose; no rhinorrhea. Normal pharynx. Moist mucus membranes. NECK: Supple; FROM without difficulty, non-tender; no cervical lymphadenopathy. CV: Normal S1, S2; no murmurs, rubs, or gallops. No chest wall tenderness. RESPIRATORY: Moderate respiratory distress with audible wheezes, speaking in 2-3 word sentences. Somewhat diminished breath sounds with expiratory wheezes. EXT: Normal ROM in all four extremities; non-tender to palpation. No edema. No calf tenderness. SKIN: Normal for age and race; warm; dry; good turgor; no apparent lesions or exudate. NEURO: A & O x3. PSYCH:  Mood and affect appropriate. Diagnostic Study Results     Labs -      Recent Results (from the past 12 hour(s))   GLUCOSE, POC    Collection Time: 04/14/17  7:06 AM   Result Value Ref Range    Glucose (POC) 301 (H) 70 - 110 mg/dL   GLUCOSE, POC    Collection Time: 04/14/17  1:03 PM   Result Value Ref Range    Glucose (POC) 214 (H) 70 - 110 mg/dL       Radiologic Studies -  The following have been ordered and reviewed:  XR CHEST PORT   Final Result:  Mildly underexpanded lungs without radiographic evidence of acute abnormality. As read by the radiologist.                  Medical Decision Making   I am the first provider for this patient. I reviewed the vital signs, available nursing notes, past medical history, past surgical history, family history and social history. Vital Signs-Reviewed the patient's vital signs.    Patient Vitals for the past 12 hrs:   Temp Pulse Resp BP SpO2   04/14/17 1204 - - - - 96 %   04/14/17 0805 97.3 °F (36.3 °C) 75 17 138/77 97 %   04/14/17 0709 - - - - 98 %     Pulse Oximetry Analysis - Normal 100% on Duo-Neb     Cardiac Monitor:   Rate: 72 bpm  Rhythm: Sinus Rhythm      EKG interpretation: (Preliminary)  NSR. Rate 78 bpm. Nonspecific T wave abnormality. EKG read by Nicole Melo PA-C at 11:28 AM     Procedures:   Procedures    ED Course:  11:01 AM   Initial assessment performed. The patients presenting problems have been discussed, and they are in agreement with the care plan formulated and outlined with them. I have encouraged them to ask questions as they arise throughout their visit. PROGRESS NOTE:   12:38 PM  Pt has been re-examined by Nicole Melo PA-C. Patient states she is feeling a little bit better. She does not appear to be in distress but is still only speaking in short sentences with diffuse expiratory wheezes; mildly tachypnic, moving more air overall and not hypoxic. She has received 125mg solumedrol, 2g of Mag, and is on her 3rd nebulizer treatment. She is tachycardic, but likely due to albuterol. Will consult hospitalist for admission. Written by Prasad Bryant, ED Scribe, as dictated by Nicole Melo PA-C.      Medications Given in the ED:  Medications   albuterol (PROVENTIL VENTOLIN) nebulizer solution 2.5 mg (not administered)   pantoprazole (PROTONIX) 40 mg in sodium chloride 0.9 % 10 mL injection (40 mg IntraVENous Given 4/14/17 1025)   acetaminophen (TYLENOL) tablet 650 mg (650 mg Oral Given 4/13/17 2201)   ondansetron (ZOFRAN) injection 4 mg (not administered)   enoxaparin (LOVENOX) injection 40 mg (40 mg SubCUTAneous Given 4/13/17 1651)   glucose chewable tablet 16 g (not administered)   glucagon (GLUCAGEN) injection 1 mg (not administered)   dextrose (D50W) injection syrg 12.5-25 g (not administered)   albuterol-ipratropium (DUO-NEB) 2.5 MG-0.5 MG/3 ML (3 mL Nebulization Given 4/14/17 1204)   insulin glargine (LANTUS) injection 16 Units (16 Units SubCUTAneous Given 4/14/17 1025)   methylPREDNISolone (PF) (SOLU-MEDROL) injection 40 mg (40 mg IntraVENous Given 4/14/17 1025)   insulin lispro (HUMALOG) injection (6 Units SubCUTAneous Given 4/14/17 1304)   insulin lispro (HUMALOG) injection 10 Units (10 Units SubCUTAneous Given 4/14/17 1304)   albuterol-ipratropium (DUO-NEB) 2.5 MG-0.5 MG/3 ML (3 mL Nebulization Given 4/11/17 1103)   methylPREDNISolone (PF) (SOLU-MEDROL) injection 125 mg (125 mg IntraVENous Given 4/11/17 1130)   magnesium sulfate 2 g/50 ml IVPB (premix or compounded) (0 g IntraVENous IV Completed 4/11/17 1220)   sodium chloride 0.9 % bolus infusion 500 mL (0 mL IntraVENous IV Completed 4/11/17 1220)   ipratropium (ATROVENT) 0.02 % nebulizer solution 0.5 mg (0.5 mg Nebulization Given 4/11/17 1122)   albuterol (PROVENTIL VENTOLIN) nebulizer solution 5 mg (5 mg Nebulization Given 4/11/17 1122)   albuterol (PROVENTIL VENTOLIN) nebulizer solution 10 mg (10 mg Nebulization Given 4/11/17 1222)   albuterol (PROVENTIL VENTOLIN) nebulizer solution 5 mg (5 mg Nebulization Given 4/11/17 1458)       CONSULT NOTE:   1:12 PM  MARYLU Ladd spoke with Dr. Delgado Chacon  Specialty: Hospitalist  Discussed pt's hx, disposition, and available diagnostic and imaging results. Reviewed care plans. Consulting physician agrees with plans as outlined and will admit to telemetry. Written by MARYLU Ladd .    1:13 PM   Patient is being admitted to the hospital by Gregor Favre, DO. The results of their tests and reasons for their admission have been discussed with them and/or available family. They convey agreement and understanding for the need to be admitted and for their admission diagnosis. CONDITIONS ON ADMISSION:  Deep Vein Thrombosis is not present at the time of admission. Thrombosis is not present at the time of admission. Urinary Tract Infection is not present at the time of admission. Pneumonia is not present at the time of admission. MRSA is not present at the time of admission.  Wound infection is not present at the time of admission. Pressure Ulcer is not present at the time of admission. Critical Care Time:   8:32 PM  I have spent 30 minutes of critical care time involved in lab review, consultations with specialist, family decision-making, and documentation. During this entire length of time I was immediately available to the patient. Critical Care: The reason for providing this level of medical care for this critically ill patient was due a critical illness that impaired one or more vital organ systems such that there was a high probability of imminent or life threatening deterioration in the patients condition. This care involved high complexity decision making to assess, manipulate, and support vital system functions, to treat this degreee vital organ system failure and to prevent further life threatening deterioration of the patients condition. Diagnosis   Clinical Impression:   1. Moderate persistent asthma with acute exacerbation           Follow-up Information     Follow up With Details Comments Contact Info    Jerad Vee MD On 4/19/2017  Follow-up appointment @ 1:30 p.m. [address] [place] [address]  [phone]          _______________________________   Attestations: This note is prepared by Higinio Ordaz, acting as a Scribe for Radha Medina PA-C on 11:01 AM on 4/11/2017 . Radha Medina PA-C: The scribe's documentation has been prepared under my direction and personally reviewed by me in its entirety.   _______________________________

## 2017-04-11 NOTE — IP AVS SNAPSHOT
Current Discharge Medication List  
  
START taking these medications Dose & Instructions Dispensing Information Comments Morning Noon Evening Bedtime Diabetic Supplies, [address] Kit Your last dose was: Your next dose is:    
   
   
 Dose:  1 Each  
1 Each by Does Not Apply route daily. Quantity:  1 Kit Refills:  0  
     
   
   
   
  
 metFORMIN 850 mg tablet Commonly known as:  GLUCOPHAGE Your last dose was: Your next dose is:    
   
   
 Dose:  850 mg Take 1 Tab by mouth two (2) times daily (with meals). Quantity:  60 Tab Refills:  0  
     
   
   
   
  
 predniSONE 10 mg dose pack Commonly known as:  STERAPRED DS Your last dose was: Your next dose is:    
   
   
 See administration instruction per 10mg dose pack Quantity:  21 Tab Refills:  0 CONTINUE these medications which have NOT CHANGED Dose & Instructions Dispensing Information Comments Morning Noon Evening Bedtime  
 albuterol 90 mcg/actuation inhaler Commonly known as:  VENTOLIN HFA Your last dose was: Your next dose is:    
   
   
 Dose:  2 Puff Take 2 Puffs by inhalation every six (6) hours as needed for Wheezing. Quantity:  1 Inhaler Refills:  0  
     
   
   
   
  
 carisoprodol 350 mg tablet Commonly known as:  SOMA Your last dose was: Your next dose is:    
   
   
 Dose:  350 mg Take 1 Tab by mouth four (4) times daily. Max Daily Amount: 1,400 mg. Quantity:  20 Tab Refills:  0  
     
   
   
   
  
 multivitamin, tx-iron-ca-min 9 mg iron-400 mcg Tab tablet Commonly known as:  THERA-M w/ IRON Your last dose was: Your next dose is:    
   
   
 Dose:  1 Tab Take 1 Tab by mouth daily. Formulary substitution for DAILY MULTIVITAMIN Refills:  0 Where to Get Your Medications Information on where to get these meds will be given to you by the nurse or doctor. ! Ask your nurse or doctor about these medications  
  albuterol 90 mcg/actuation inhaler Diabetic Supplies, [address]. Kit  
 metFORMIN 850 mg tablet  
 predniSONE 10 mg dose pack

## 2017-04-11 NOTE — ROUTINE PROCESS
TRANSFER - OUT REPORT:    Verbal report given to LILI Ocampo(name) on Rosy Ards  being transferred to telemetry (unit) for routine progression of care       Report consisted of patients Situation, Background, Assessment and   Recommendations(SBAR). Mews score 4    Information from the following report(s) SBAR, Kardex, ED Summary and MAR was reviewed with the receiving nurse. Lines:       Opportunity for questions and clarification was provided.       Patient transported with:   Monitor  Registered Nurse  Tech

## 2017-04-12 LAB
ANION GAP BLD CALC-SCNC: 10 MMOL/L (ref 3–18)
ATRIAL RATE: 78 BPM
BASOPHILS # BLD AUTO: 0 K/UL (ref 0–0.06)
BASOPHILS # BLD: 0 % (ref 0–2)
BUN SERPL-MCNC: 13 MG/DL (ref 7–18)
BUN/CREAT SERPL: 14 (ref 12–20)
CALCIUM SERPL-MCNC: 8.9 MG/DL (ref 8.5–10.1)
CALCULATED P AXIS, ECG09: 44 DEGREES
CALCULATED R AXIS, ECG10: 19 DEGREES
CALCULATED T AXIS, ECG11: 3 DEGREES
CHLORIDE SERPL-SCNC: 103 MMOL/L (ref 100–108)
CO2 SERPL-SCNC: 24 MMOL/L (ref 21–32)
CREAT SERPL-MCNC: 0.93 MG/DL (ref 0.6–1.3)
DIAGNOSIS, 93000: NORMAL
DIFFERENTIAL METHOD BLD: ABNORMAL
EOSINOPHIL # BLD: 0 K/UL (ref 0–0.4)
EOSINOPHIL NFR BLD: 0 % (ref 0–5)
ERYTHROCYTE [DISTWIDTH] IN BLOOD BY AUTOMATED COUNT: 13.7 % (ref 11.6–14.5)
GLUCOSE BLD STRIP.AUTO-MCNC: 234 MG/DL (ref 70–110)
GLUCOSE BLD STRIP.AUTO-MCNC: 256 MG/DL (ref 70–110)
GLUCOSE BLD STRIP.AUTO-MCNC: 263 MG/DL (ref 70–110)
GLUCOSE SERPL-MCNC: 259 MG/DL (ref 74–99)
HCT VFR BLD AUTO: 33.9 % (ref 35–45)
HGB BLD-MCNC: 11.4 G/DL (ref 12–16)
LYMPHOCYTES # BLD AUTO: 16 % (ref 21–52)
LYMPHOCYTES # BLD: 1.8 K/UL (ref 0.9–3.6)
MCH RBC QN AUTO: 28.6 PG (ref 24–34)
MCHC RBC AUTO-ENTMCNC: 33.6 G/DL (ref 31–37)
MCV RBC AUTO: 85 FL (ref 74–97)
MONOCYTES # BLD: 0.2 K/UL (ref 0.05–1.2)
MONOCYTES NFR BLD AUTO: 2 % (ref 3–10)
NEUTS SEG # BLD: 9.3 K/UL (ref 1.8–8)
NEUTS SEG NFR BLD AUTO: 82 % (ref 40–73)
P-R INTERVAL, ECG05: 164 MS
PLATELET # BLD AUTO: 239 K/UL (ref 135–420)
PMV BLD AUTO: 10.4 FL (ref 9.2–11.8)
POTASSIUM SERPL-SCNC: 4.2 MMOL/L (ref 3.5–5.5)
Q-T INTERVAL, ECG07: 380 MS
QRS DURATION, ECG06: 102 MS
QTC CALCULATION (BEZET), ECG08: 433 MS
RBC # BLD AUTO: 3.99 M/UL (ref 4.2–5.3)
SODIUM SERPL-SCNC: 137 MMOL/L (ref 136–145)
VENTRICULAR RATE, ECG03: 78 BPM
WBC # BLD AUTO: 11.4 K/UL (ref 4.6–13.2)

## 2017-04-12 PROCEDURE — 85025 COMPLETE CBC W/AUTO DIFF WBC: CPT | Performed by: INTERNAL MEDICINE

## 2017-04-12 PROCEDURE — 74011250636 HC RX REV CODE- 250/636: Performed by: HOSPITALIST

## 2017-04-12 PROCEDURE — 74011000250 HC RX REV CODE- 250: Performed by: INTERNAL MEDICINE

## 2017-04-12 PROCEDURE — 94760 N-INVAS EAR/PLS OXIMETRY 1: CPT

## 2017-04-12 PROCEDURE — 82962 GLUCOSE BLOOD TEST: CPT

## 2017-04-12 PROCEDURE — 74011250636 HC RX REV CODE- 250/636: Performed by: INTERNAL MEDICINE

## 2017-04-12 PROCEDURE — 36415 COLL VENOUS BLD VENIPUNCTURE: CPT | Performed by: INTERNAL MEDICINE

## 2017-04-12 PROCEDURE — 94640 AIRWAY INHALATION TREATMENT: CPT

## 2017-04-12 PROCEDURE — C9113 INJ PANTOPRAZOLE SODIUM, VIA: HCPCS | Performed by: INTERNAL MEDICINE

## 2017-04-12 PROCEDURE — 74011250637 HC RX REV CODE- 250/637: Performed by: INTERNAL MEDICINE

## 2017-04-12 PROCEDURE — 65660000000 HC RM CCU STEPDOWN

## 2017-04-12 PROCEDURE — 80048 BASIC METABOLIC PNL TOTAL CA: CPT | Performed by: INTERNAL MEDICINE

## 2017-04-12 PROCEDURE — 74011636637 HC RX REV CODE- 636/637: Performed by: INTERNAL MEDICINE

## 2017-04-12 RX ADMIN — IPRATROPIUM BROMIDE AND ALBUTEROL SULFATE 3 ML: .5; 3 SOLUTION RESPIRATORY (INHALATION) at 20:24

## 2017-04-12 RX ADMIN — INSULIN LISPRO 6 UNITS: 100 INJECTION, SOLUTION INTRAVENOUS; SUBCUTANEOUS at 18:00

## 2017-04-12 RX ADMIN — METHYLPREDNISOLONE SODIUM SUCCINATE 40 MG: 40 INJECTION, POWDER, FOR SOLUTION INTRAMUSCULAR; INTRAVENOUS at 05:53

## 2017-04-12 RX ADMIN — IPRATROPIUM BROMIDE AND ALBUTEROL SULFATE 3 ML: .5; 3 SOLUTION RESPIRATORY (INHALATION) at 16:05

## 2017-04-12 RX ADMIN — ACETAMINOPHEN 650 MG: 325 TABLET, FILM COATED ORAL at 18:51

## 2017-04-12 RX ADMIN — IPRATROPIUM BROMIDE AND ALBUTEROL SULFATE 3 ML: .5; 3 SOLUTION RESPIRATORY (INHALATION) at 12:02

## 2017-04-12 RX ADMIN — METHYLPREDNISOLONE SODIUM SUCCINATE 40 MG: 40 INJECTION, POWDER, FOR SOLUTION INTRAMUSCULAR; INTRAVENOUS at 22:00

## 2017-04-12 RX ADMIN — INSULIN LISPRO 4 UNITS: 100 INJECTION, SOLUTION INTRAVENOUS; SUBCUTANEOUS at 07:21

## 2017-04-12 RX ADMIN — SODIUM CHLORIDE 40 MG: 9 INJECTION INTRAMUSCULAR; INTRAVENOUS; SUBCUTANEOUS at 09:43

## 2017-04-12 RX ADMIN — INSULIN LISPRO 6 UNITS: 100 INJECTION, SOLUTION INTRAVENOUS; SUBCUTANEOUS at 12:59

## 2017-04-12 RX ADMIN — ENOXAPARIN SODIUM 40 MG: 40 INJECTION SUBCUTANEOUS at 16:12

## 2017-04-12 RX ADMIN — INSULIN LISPRO 6 UNITS: 100 INJECTION, SOLUTION INTRAVENOUS; SUBCUTANEOUS at 22:00

## 2017-04-12 RX ADMIN — IPRATROPIUM BROMIDE AND ALBUTEROL SULFATE 3 ML: .5; 3 SOLUTION RESPIRATORY (INHALATION) at 23:15

## 2017-04-12 RX ADMIN — IPRATROPIUM BROMIDE AND ALBUTEROL SULFATE 3 ML: .5; 3 SOLUTION RESPIRATORY (INHALATION) at 07:15

## 2017-04-12 NOTE — PROGRESS NOTES
Peak flow done by pt but with minimal effort as she is very tired and is finally sleeping. She managed to achieve 300 L, while lying down f/b strong wet but non prod. Coughs. When asked if she ever clears from wheezing, she replied, \"Sometimes\".

## 2017-04-12 NOTE — PROGRESS NOTES
Hospitalist Progress Note-critical care note     Patient: Afua Barker MRN: [id number]  CSN: [id number]    YOB: 1970  Age: 42 y.o. Sex: female    DOA: 4/11/2017 LOS:  LOS: 1 day            Chief complaint: asthma exacerbation, hyperglycemia     Assessment/Plan         Patient Active Problem List   Diagnosis Code    Asthma with acute exacerbation J45. 901    Obesity E66.9    Hyperglycemia R73.9     1. Asthma with acute  Exacerbation   Mild improving, will continue breathing tx and iv steroid. Decrease iv steroid to Q8hr ,continue peakflow   2. Hyperglycemia   Due to steroid induce, will continue ssi   3. Obesity   Need life style change    Subjective; wheezing is better  Nurse; no acute issue     Review of systems:    General: No fevers or chills. Cardiovascular: No chest pain or pressure. No palpitations. Pulmonary:  shortness of breath is better   Gastrointestinal: No nausea, vomiting. Vital signs/Intake and Output:  Visit Vitals    /90 (BP 1 Location: Left arm, BP Patient Position: At rest)    Pulse 75    Temp 98 °F (36.7 °C)    Resp 20    Ht 5' 4\" (1.626 m)    Wt 80.6 kg (177 lb 11.1 oz)    SpO2 98%    BMI 30.5 kg/m2     Current Shift:  04/12 0701 - 04/12 1900  In: 240 [P.O.:240]  Out: -   Last three shifts:  04/10 1901 - 04/12 0700  In: 480 [P.O.:480]  Out: 800 [Urine:800]    Physical Exam:  General: WD, WN. Alert, cooperative, no acute distress    HEENT: NC, Atraumatic. PERRLA, anicteric sclerae. Lungs: Mild wheezing   Heart:  Regular  rhythm,  No murmur, No Rubs, No Gallops  Abdomen: Soft, Non distended, Non tender.  +Bowel sounds,   Extremities: No c/c/e  Psych:   Not anxious or agitated. Neurologic:  No acute neurological deficit.              Labs: Results:       Chemistry Recent Labs      04/12/17   0500  04/11/17   1124   GLU  259*  212*   NA  137  138   K  4.2  3.9   CL  103  102   CO2  24  30   BUN  13  10   CREA  0.93  0.80   CA  8.9  9.0   AGAP  10  6 BUCR  14  13      CBC w/Diff Recent Labs      04/12/17   0500  04/11/17   1124   WBC  11.4  8.8   RBC  3.99*  4.28   HGB  11.4*  12.3   HCT  33.9*  36.3   PLT  239  234   GRANS  82*  63   LYMPH  16*  29   EOS  0  3      Cardiac Enzymes Recent Labs      04/11/17   1124   CPK  238*   CKND1  0.5      Coagulation No results for input(s): PTP, INR, APTT in the last 72 hours. No lab exists for component: INREXT    Lipid Panel No results found for: CHOL, CHOLPOCT, CHOLX, CHLST, CHOLV, [id number], HDL, LDL, NLDLCT, DLDL, LDLC, DLDLP, [id number], VLDLC, VLDL, TGL, TGLX, TRIGL, [id number], TRIGP, TGLPOCT, [id number], CHHD, CHHDX   BNP No results for input(s): BNPP in the last 72 hours. Liver Enzymes No results for input(s): TP, ALB, TBIL, AP, SGOT, GPT in the last 72 hours.     No lab exists for component: DBIL   Thyroid Studies Lab Results   Component Value Date/Time    TSH 2.75 07/14/2010 05:15 PM        Procedures/imaging: see electronic medical records for all procedures/Xrays and details which were not copied into this note but were reviewed prior to creation of Kenyon Kennedy MD

## 2017-04-12 NOTE — PROGRESS NOTES
Pt resting in bed, no sign of distress noted. Will continue to monitor. 0430. Asleep. No sign of distress noted. 0730. Bedside and Verbal shift change report given to Zada Merlin, RN (oncoming nurse) by Jimmie Rain RN   (offgoing nurse).  Report included the following information SBAR, ED Summary, Intake/Output, MAR and Recent Results

## 2017-04-12 NOTE — PROGRESS NOTES
Bedside shift change report given to Zahra Garza (oncoming nurse) by Dejon Swartz RN(offgoing nurse). Report included the following information SBAR, Kardex, Intake/Output and MAR.

## 2017-04-12 NOTE — DIABETES MGMT
GLYCEMIC CONTROL PROGRESS NOTE:    -pt admitted for hyperglycemic and acute asthma exacerbation  -pt with no documented h/o T2DM,  A1C 7.3% meets criteria for DM  -IV steroids on board, 40 mg IV q 6 hours  -BG out of range with last 3 POCT >200 mg/dL; pt meets criteria for Glucostablizer (2 BG >180 mg/dL)  TDD = 16 units of corrective coverage  -POCT + corrective coverage orders in place   Recommendation initiate weight based basal/bolus regimen and adjust as needed as steroids are tapered   *Lantus 15 units daily   *Humalog 4 units TID AC   *continue corrective coverage      Recent Glucose Results:   Lab Results   Component Value Date/Time     (H) 04/12/2017 05:00 AM     (H) 04/11/2017 11:24 AM    GLUCPOC 234 (H) 04/12/2017 06:16 AM    GLUCPOC 280 (H) 04/11/2017 09:25 PM    GLUCPOC 277 (H) 04/11/2017 04:37 PM         Olivia Llanos RN, MS  Glycemic Control Team

## 2017-04-12 NOTE — DIABETES MGMT
GLYCEMIC CONTROL AND NUTRITION PROGRESS NOTE:    Assessment/Recommendations:  -pt admitted for hyperglycemic and acute asthma exacerbation  -pt with no documented h/o T2DM, A1C 7.3% meets criteria for DM  -IV steroids on board, 40 mg IV q 6 hours  -BG out of range with last 3 POCT >200 mg/dL; pt meets criteria for Glucostablizer (2 BG >180 mg/dL)  TDD = 16 units of corrective coverage  -POCT + corrective coverage orders in place   Recommendation initiate weight based basal/bolus regimen and adjust as needed as steroids are tapered  *Lantus 15 units daily  *Humalog 4 units TID AC  *continue corrective coverage  -basic education provided (see note)    Subjective:  -pt states per her PCP, h/o prediabetes in the past, along with a strong family h/o T2DM    Goal:  - BG will be in target range of  mg/dl (non-ICU) by 4/16  -PO intake will be 75% of meals offered by 4/16  -pt will follow up with [facility] by 5/4    Most recent blood glucose values:   Lab Results   Component Value Date/Time     (H) 04/12/2017 05:00 AM    GLUCPOC 234 (H) 04/12/2017 06:16 AM    GLUCPOC 280 (H) 04/11/2017 09:25 PM    GLUCPOC 277 (H) 04/11/2017 04:37 PM         Current A1C is equivalent to average blood glucose of 163 mg/dl over the past 2-3 months. Lab Results   Component Value Date/Time    Hemoglobin A1c 7.3 04/11/2017 11:26 AM     Current hospital diabetes medications:   Humalog corrective coverage    Previous day's insulin requirements:   Humalog 16 units corrective coverage    Home diabetes medications:  None     Body mass index is 30.5 kg/(m^2).   Last 3 Recorded Weights in this Encounter    04/11/17 1106 04/12/17 0324   Weight: 80.7 kg (178 lb) 80.6 kg (177 lb 11.1 oz)    Ht Readings from Last 1 Encounters:   04/11/17 5' 4\" (1.626 m)       Diet:    Active Orders   Diet    DIET REGULAR       Intake:   Patient Vitals for the past 100 hrs:   % Diet Eaten   04/12/17 1143 60 %   04/11/17 1855 75 %   04/11/17 1556 0 %       Education:  _x___Refer to Diabetes Education Record             ____Education not indicated at this time        Claudio Nguyen RN, MS

## 2017-04-12 NOTE — PROGRESS NOTES
Readmission Risk Assessment: Low Risk and MSSP/[facility] Help [facility] patients    RRAT Score: 1 - 12 (7)    Initial Assessment: Chart Reviewed. Noted patient admitted to the hospital for further medical management. Care Management to follow up with patient and/or family for transition of care needs prn. Emergency Contact:     Pertinent Medical Hx: see H&P  PCP/Specialists: Community Services:     DME:     Low Risk Care Transition Plan:  1. Evaluate for [facility] or [address] coordination of resources  2. Involve patient/caregiver in assessment, planning, education and implement of intervention. 3. CM daily patient care huddles/interdisciplinary rounds. 4. PCP/Specialist appointment within 7 - 10 days made prior to discharge. 5. Facilitate transportation and logistics for follow-up appointments. 6. Handoff to [address] Nurse Navigator or PCP practice.

## 2017-04-12 NOTE — DIABETES MGMT
.Diabetes Patient/Family Education Record    Factors That  May Influence Patients Ability  to Learn or  Comply With  Recommendations:    []   Language barrier    []   Cultural needs   []   Motivation    []   Cognitive limitation    []   Physical   []   Education    []   Physiological factors   []   Hearing/vision/speaking impairment   []   Quaker beliefs    []   Financial factors   []  Other:   [x]  No factors identified at this time.      Person Instructed:   [x]   Patient   []   Family   []  Other     Preference for Learning:   [x]   Verbal   [x]   Written   []  Demonstration     Level of Comprehension & Competence:    []  Good                                      [x] Fair                                     []  Poor                             [x]  Needs Reinforcement   []  Teachback completed    Education Component: basic overview of DM provided   []  Medication management, including how to administer insulin (if appropriate) and potential medication interactions    [x]  Nutritional management including the role of carbohydrate intake   []  Exercise   []  Signs, symptoms, and treatment of hyperglycemia and hypoglycemia   [] Treatment of hyperglycemia and hypoglycemia   []  Importance of blood glucose monitoring and how to obtain a blood glucose meter    []  Instruction on use of blood glucose meter   [x]  Discuss the importance of HbA1C monitoring and provide patient with  results   []  Sick day guidelines   []  Proper use and disposal of lancets, needles, syringes or insulin pens (if appropriate)   []  Potential long-term complications (retinopathy, kidney disease, neuropathy, heart disease, stroke, vascular disease, foot care)   [] Provide emergency contact number and contact number for more information    [x]  Goal:  Patient/family will demonstrate understanding of Diabetes Self Management Skills by: (date) _5/15  Plan for post-discharge education or self-management support: pt encouraged to follow up with [facility] for diabetes management    [x] Outpatient class schedule provided            [] Patient Declined    [] Scheduled for outpatient classes (date) _______       Ángel Serra RN, MS

## 2017-04-13 PROBLEM — R73.9 HYPERGLYCEMIA: Status: RESOLVED | Noted: 2017-04-11 | Resolved: 2017-04-13

## 2017-04-13 PROBLEM — E11.9 TYPE II DIABETES MELLITUS (HCC): Status: ACTIVE | Noted: 2017-04-13

## 2017-04-13 LAB
ANION GAP BLD CALC-SCNC: 9 MMOL/L (ref 3–18)
BASOPHILS # BLD AUTO: 0 K/UL (ref 0–0.06)
BASOPHILS # BLD: 0 % (ref 0–2)
BUN SERPL-MCNC: 20 MG/DL (ref 7–18)
BUN/CREAT SERPL: 21 (ref 12–20)
CALCIUM SERPL-MCNC: 8.9 MG/DL (ref 8.5–10.1)
CHLORIDE SERPL-SCNC: 103 MMOL/L (ref 100–108)
CO2 SERPL-SCNC: 24 MMOL/L (ref 21–32)
CREAT SERPL-MCNC: 0.96 MG/DL (ref 0.6–1.3)
DIFFERENTIAL METHOD BLD: ABNORMAL
EOSINOPHIL # BLD: 0 K/UL (ref 0–0.4)
EOSINOPHIL NFR BLD: 0 % (ref 0–5)
ERYTHROCYTE [DISTWIDTH] IN BLOOD BY AUTOMATED COUNT: 14.1 % (ref 11.6–14.5)
GLUCOSE BLD STRIP.AUTO-MCNC: 257 MG/DL (ref 70–110)
GLUCOSE BLD STRIP.AUTO-MCNC: 297 MG/DL (ref 70–110)
GLUCOSE BLD STRIP.AUTO-MCNC: 303 MG/DL (ref 70–110)
GLUCOSE BLD STRIP.AUTO-MCNC: 361 MG/DL (ref 70–110)
GLUCOSE SERPL-MCNC: 304 MG/DL (ref 74–99)
HCT VFR BLD AUTO: 34.7 % (ref 35–45)
HGB BLD-MCNC: 11.5 G/DL (ref 12–16)
LYMPHOCYTES # BLD AUTO: 15 % (ref 21–52)
LYMPHOCYTES # BLD: 1.9 K/UL (ref 0.9–3.6)
MCH RBC QN AUTO: 28.5 PG (ref 24–34)
MCHC RBC AUTO-ENTMCNC: 33.1 G/DL (ref 31–37)
MCV RBC AUTO: 86.1 FL (ref 74–97)
MONOCYTES # BLD: 0.6 K/UL (ref 0.05–1.2)
MONOCYTES NFR BLD AUTO: 5 % (ref 3–10)
NEUTS SEG # BLD: 10.1 K/UL (ref 1.8–8)
NEUTS SEG NFR BLD AUTO: 80 % (ref 40–73)
PLATELET # BLD AUTO: 261 K/UL (ref 135–420)
PMV BLD AUTO: 10.4 FL (ref 9.2–11.8)
POTASSIUM SERPL-SCNC: 4.7 MMOL/L (ref 3.5–5.5)
RBC # BLD AUTO: 4.03 M/UL (ref 4.2–5.3)
SODIUM SERPL-SCNC: 136 MMOL/L (ref 136–145)
WBC # BLD AUTO: 12.6 K/UL (ref 4.6–13.2)

## 2017-04-13 PROCEDURE — 74011000250 HC RX REV CODE- 250: Performed by: INTERNAL MEDICINE

## 2017-04-13 PROCEDURE — 74011250636 HC RX REV CODE- 250/636: Performed by: INTERNAL MEDICINE

## 2017-04-13 PROCEDURE — 94640 AIRWAY INHALATION TREATMENT: CPT

## 2017-04-13 PROCEDURE — C9113 INJ PANTOPRAZOLE SODIUM, VIA: HCPCS | Performed by: INTERNAL MEDICINE

## 2017-04-13 PROCEDURE — 85025 COMPLETE CBC W/AUTO DIFF WBC: CPT | Performed by: INTERNAL MEDICINE

## 2017-04-13 PROCEDURE — 74011636637 HC RX REV CODE- 636/637: Performed by: HOSPITALIST

## 2017-04-13 PROCEDURE — 74011250636 HC RX REV CODE- 250/636: Performed by: HOSPITALIST

## 2017-04-13 PROCEDURE — 94760 N-INVAS EAR/PLS OXIMETRY 1: CPT

## 2017-04-13 PROCEDURE — 74011250637 HC RX REV CODE- 250/637: Performed by: INTERNAL MEDICINE

## 2017-04-13 PROCEDURE — 36415 COLL VENOUS BLD VENIPUNCTURE: CPT | Performed by: INTERNAL MEDICINE

## 2017-04-13 PROCEDURE — 65660000000 HC RM CCU STEPDOWN

## 2017-04-13 PROCEDURE — 80048 BASIC METABOLIC PNL TOTAL CA: CPT | Performed by: INTERNAL MEDICINE

## 2017-04-13 PROCEDURE — 74011636637 HC RX REV CODE- 636/637: Performed by: INTERNAL MEDICINE

## 2017-04-13 PROCEDURE — 82962 GLUCOSE BLOOD TEST: CPT

## 2017-04-13 RX ORDER — INSULIN LISPRO 100 [IU]/ML
5 INJECTION, SOLUTION INTRAVENOUS; SUBCUTANEOUS
Status: DISCONTINUED | OUTPATIENT
Start: 2017-04-13 | End: 2017-04-14

## 2017-04-13 RX ORDER — INSULIN GLARGINE 100 [IU]/ML
16 INJECTION, SOLUTION SUBCUTANEOUS DAILY
Status: DISCONTINUED | OUTPATIENT
Start: 2017-04-13 | End: 2017-04-14 | Stop reason: HOSPADM

## 2017-04-13 RX ORDER — INSULIN LISPRO 100 [IU]/ML
INJECTION, SOLUTION INTRAVENOUS; SUBCUTANEOUS
Status: DISCONTINUED | OUTPATIENT
Start: 2017-04-13 | End: 2017-04-14 | Stop reason: HOSPADM

## 2017-04-13 RX ORDER — INSULIN LISPRO 100 [IU]/ML
INJECTION, SOLUTION INTRAVENOUS; SUBCUTANEOUS
Status: DISCONTINUED | OUTPATIENT
Start: 2017-04-13 | End: 2017-04-13

## 2017-04-13 RX ADMIN — INSULIN LISPRO 9 UNITS: 100 INJECTION, SOLUTION INTRAVENOUS; SUBCUTANEOUS at 22:01

## 2017-04-13 RX ADMIN — IPRATROPIUM BROMIDE AND ALBUTEROL SULFATE 3 ML: .5; 3 SOLUTION RESPIRATORY (INHALATION) at 07:05

## 2017-04-13 RX ADMIN — IPRATROPIUM BROMIDE AND ALBUTEROL SULFATE 3 ML: .5; 3 SOLUTION RESPIRATORY (INHALATION) at 23:56

## 2017-04-13 RX ADMIN — SODIUM CHLORIDE 40 MG: 9 INJECTION INTRAMUSCULAR; INTRAVENOUS; SUBCUTANEOUS at 08:47

## 2017-04-13 RX ADMIN — INSULIN GLARGINE 16 UNITS: 100 INJECTION, SOLUTION SUBCUTANEOUS at 09:06

## 2017-04-13 RX ADMIN — INSULIN LISPRO 12 UNITS: 100 INJECTION, SOLUTION INTRAVENOUS; SUBCUTANEOUS at 17:21

## 2017-04-13 RX ADMIN — ENOXAPARIN SODIUM 40 MG: 40 INJECTION SUBCUTANEOUS at 16:51

## 2017-04-13 RX ADMIN — ACETAMINOPHEN 650 MG: 325 TABLET, FILM COATED ORAL at 22:01

## 2017-04-13 RX ADMIN — ACETAMINOPHEN 650 MG: 325 TABLET, FILM COATED ORAL at 08:47

## 2017-04-13 RX ADMIN — INSULIN LISPRO 5 UNITS: 100 INJECTION, SOLUTION INTRAVENOUS; SUBCUTANEOUS at 17:20

## 2017-04-13 RX ADMIN — METHYLPREDNISOLONE SODIUM SUCCINATE 40 MG: 40 INJECTION, POWDER, FOR SOLUTION INTRAMUSCULAR; INTRAVENOUS at 06:56

## 2017-04-13 RX ADMIN — INSULIN LISPRO 5 UNITS: 100 INJECTION, SOLUTION INTRAVENOUS; SUBCUTANEOUS at 09:05

## 2017-04-13 RX ADMIN — INSULIN LISPRO 9 UNITS: 100 INJECTION, SOLUTION INTRAVENOUS; SUBCUTANEOUS at 07:30

## 2017-04-13 RX ADMIN — IPRATROPIUM BROMIDE AND ALBUTEROL SULFATE 3 ML: .5; 3 SOLUTION RESPIRATORY (INHALATION) at 15:27

## 2017-04-13 RX ADMIN — IPRATROPIUM BROMIDE AND ALBUTEROL SULFATE 3 ML: .5; 3 SOLUTION RESPIRATORY (INHALATION) at 20:43

## 2017-04-13 RX ADMIN — IPRATROPIUM BROMIDE AND ALBUTEROL SULFATE 3 ML: .5; 3 SOLUTION RESPIRATORY (INHALATION) at 11:03

## 2017-04-13 RX ADMIN — METHYLPREDNISOLONE SODIUM SUCCINATE 40 MG: 40 INJECTION, POWDER, FOR SOLUTION INTRAMUSCULAR; INTRAVENOUS at 22:01

## 2017-04-13 NOTE — PROGRESS NOTES
Bedside shift change report given to [facility] (oncoming nurse) by Clover Matson RN   (offgoing nurse). Report included the following information SBAR, Kardex, Intake/Output, MAR and Recent Results.

## 2017-04-13 NOTE — PROGRESS NOTES
Chart reviewed, noted 42 yr old female admitted from home for asthma exacerbation. Met with pt during IDR at which time pt reported no PCP and was agreeable to Dr Shannon Hadley checking with Dr Jeffrey Pedro about accepting pt. Met with pt later who reported that: she was employed; she lived alone and would return home at discharge; she did not expect to have any d/c needs. CMgr will be available should d/c needs arise.

## 2017-04-13 NOTE — PROGRESS NOTES
Hospitalist Progress Note-critical care note     Patient: Malia Mcgill MRN: [id number]  CSN: [id number]    YOB: 1970  Age: 42 y.o. Sex: female    DOA: 4/11/2017 LOS:  LOS: 2 days            Chief complaint: asthma exacerbation, DM type II-new     Assessment/Plan         Patient Active Problem List   Diagnosis Code    Asthma with acute exacerbation J45. 0    Obesity E66.9    [facility] E11.9     1. Asthma with acute  Exacerbation   Mild improving, will continue breathing tx and iv steroid. Decrease iv steroid to Q12 hr ,continue peakflow     2. New DM type II  On insulin now, will start po meds on d.c ,     3. Obesity   Need life style change    Subjective; wheezing is improving   Nurse; no acute issue     Review of systems:    General: No fevers or chills. Cardiovascular: No chest pain or pressure. No palpitations. Pulmonary:  shortness of breath is better   Gastrointestinal: No nausea, vomiting. Vital signs/Intake and Output:  Visit Vitals    /72 (BP 1 Location: Right arm, BP Patient Position: At rest;Lying left side)    Pulse 66    Temp 98.2 °F (36.8 °C)    Resp 16    Ht 5' 4\" (1.626 m)    Wt 81 kg (178 lb 9.2 oz)    SpO2 98%    BMI 30.65 kg/m2     Current Shift:  04/13 0701 - 04/13 1900  In: 240 [P.O.:240]  Out: 1000 [Urine:1000]  Last three shifts:  04/11 1901 - 04/13 0700  In: 1200 [P.O.:1200]  Out: 1800 [Urine:1800]    Physical Exam:  General: WD, WN. Alert, cooperative, no acute distress    HEENT: NC, Atraumatic. PERRLA, anicteric sclerae. Lungs: Mild wheezing -improving   Heart:  Regular  rhythm,  No murmur, No Rubs, No Gallops  Abdomen: Soft, Non distended, Non tender.  +Bowel sounds,   Extremities: No c/c/e  Psych:   Not anxious or agitated. Neurologic:  No acute neurological deficit.              Labs: Results:       Chemistry Recent Labs      04/13/17   0213  04/12/17   0500  04/11/17   1124   GLU  304*  259*  212*   NA  136  137  138 K  4.7  4.2  3.9   CL  103  103  102   CO2  24  24  30   BUN  20*  13  10   CREA  0.96  0.93  0.80   CA  8.9  8.9  9.0   AGAP  9  10  6   BUCR  21*  14  13      CBC w/Diff Recent Labs      04/13/17   0213  04/12/17   0500  04/11/17   1124   WBC  12.6  11.4  8.8   RBC  4.03*  3.99*  4.28   HGB  11.5*  11.4*  12.3   HCT  34.7*  33.9*  36.3   PLT  261  239  234   GRANS  80*  82*  63   LYMPH  15*  16*  29   EOS  0  0  3      Cardiac Enzymes Recent Labs      04/11/17   1124   CPK  238*   CKND1  0.5      Coagulation No results for input(s): PTP, INR, APTT in the last 72 hours. No lab exists for component: INREXT, INREXT    Lipid Panel No results found for: CHOL, CHOLPOCT, CHOLX, CHLST, CHOLV, [id number], HDL, LDL, NLDLCT, DLDL, LDLC, DLDLP, [id number], VLDLC, VLDL, TGL, TGLX, TRIGL, [id number], TRIGP, TGLPOCT, [id number], CHHD, CHHDX   BNP No results for input(s): BNPP in the last 72 hours. Liver Enzymes No results for input(s): TP, ALB, TBIL, AP, SGOT, GPT in the last 72 hours.     No lab exists for component: DBIL   Thyroid Studies Lab Results   Component Value Date/Time    TSH 2.75 07/14/2010 05:15 PM        Procedures/imaging: see electronic medical records for all procedures/Xrays and details which were not copied into this note but were reviewed prior to creation of Yazmin Haddad MD

## 2017-04-13 NOTE — DIABETES MGMT
Diabetes Patient/Family Education Record  - new dx, Type 2   - in depth education today   - general overview of all DM, disease process, nutrition, SMBG, hyper + hypo   - glucometer & SMBG, 1x/day in AM prior to breakfast    * pt will need script for glucometer & supplies  - discussed helpful nutrition & diabetes apps for logging & counting carbs   - pt was able to recall and give examples of CHO containing foods and read food label    Factors That  May Influence Patients Ability  to Learn or  Comply With  Recommendations:    []   Language barrier    []   Cultural needs   []   Motivation    []   Cognitive limitation    []   Physical   []   Education    []   Physiological factors   []   Hearing/vision/speaking impairment   []   Yarsanism beliefs    []   Financial factors   []  Other:   [x]  No factors identified at this time.      Person Instructed:   [x]   Patient   []   Family   []  Other     Preference for Learning:   [x]   Verbal   [x]   Written   []  Demonstration     Level of Comprehension & Competence:    []  Good                                      [x] Fair                                     []  Poor                             [x]  Needs Reinforcement   [x]  Teachback completed (see above)    Education Component:   [x]  Medication management, metformin, diet and lifestyle   [x]  Nutritional management including the role of carbohydrate intake, CHO counting   [x]  Exercise   []  Signs, symptoms, and treatment of hyperglycemia and hypoglycemia   [] Treatment of hyperglycemia and hypoglycemia   [x]  Importance of blood glucose monitoring and how to obtain a blood glucose meter    [x]  Instruction on use of blood glucose meter   [x]  Discuss the importance of HbA1C monitoring and provide patient with  results   []  Sick day guidelines   []  Proper use and disposal of lancets, needles, syringes or insulin pens (if appropriate)   [x]  Potential long-term complications (retinopathy, kidney disease, neuropathy, heart disease, stroke, vascular disease, foot care)   [] Provide emergency contact number and contact number for more information    [x]  Goal:  Patient/family will demonstrate understanding of Diabetes Self Management Skills by: (date) _5/1/17____  Plan for post-discharge education or self-management support:    [x] Outpatient class schedule provided            [] Patient Declined    [] Scheduled for outpatient classes (date) _______         Francisco Javier Nuñez.  Emre Blanco, MPH, RD

## 2017-04-13 NOTE — PROGRESS NOTES
3430  Assumed care of pt at this time. Assessment complete. Pt alert and oriented x 4 resting . Denies SOB and chest pain. Pt lungs diminished with slight expiratory wheezing bilaterally. Cap refill less than 3 seconds. Pt denies numbness and tingling to all extremities. Stated pain 0/10. Pt has 20 G IV to r ight hand. Call light and possessions within reach. Bed in low position. Will continue to monitor. 1150  Rounded with Dr Ramana Eubanks. Dr Ramana Eubanks aware pt BS up in 300's due to steroids. Lantus and meal time coverage added. Plan for pt to d/c tom. Shift summary   Pt is alert and oriented x 4. Pt had uneventful shift. Pt ambulating and voiding sufficient amounts.

## 2017-04-13 NOTE — DIABETES MGMT
GLYCEMIC CONTROL AND NUTRITION PROGRESS NOTE:    Assessment/Recommendations:  - new dx, Type 2, basal/bolus insulin orders in place, recommend continue, noted steroids on board  - in depth education today (see note)  - glucometer   * pt will need script for meter   - recommend d/c pt on Metformin 500 mg qd with meal  - Nutrition dx   - altered nutrition-related lab values AEB A1c of 7.3% and new onset-DM diagnosis   - overweight r/t h/o excessive energy intake AEB BMI of 31%  - nutrition & disease knowledge deficit r/t DM AEB pt report of new dx and no previous DM education    - recommend add Consistent CHO to diet (order entered)  - encouraged OP DM Self Management Class (schedule given)    Subjective:  - pt reports no previous h/o DM, has been borderline for years, reports extensive family h/o of DM   - enjoys regular soda, juice, and sweet tea     Goal:  - BG will be in target range of  mg/dl (non-ICU) or 140-180 md/dl (ICU) by 4/17/17  - pt will follow-up with OP PCM re: DM management by 5/1/17  - pt will begin to check BG at home at least 1x/day by 4/21/17  - pt kristy be able to identify CHO containing foods by 4/13/17 (met)  - pt will be able to read a nutrition label by 4/13/17 (met)       Most recent blood glucose values:   Lab Results   Component Value Date/Time     (H) 04/13/2017 02:13 AM    GLUCPOC 361 (H) 04/13/2017 02:13 PM    GLUCPOC 362 (H) 04/13/2017 02:11 PM    GLUCPOC 297 (H) 04/13/2017 06:31 AM             Current A1C is equivalent to average blood glucose of 163 mg/dl over the past 2-3 months.     Lab Results   Component Value Date/Time    Hemoglobin A1c 7.3 04/11/2017 11:26 AM       Current hospital diabetes medications:   - Lantus 16 units every day  - Humalog 5 units qac  - Humalog Normal Insulin Sensitivity Corrective Coverage    Previous day's insulin requirements:   - 22 units total (Humalog, all corrective)    Home diabetes medications:  - n/a, new dx    Estimated Nutrition Needs: 1500 Kcals/day (25 kcal/kg of IBW), Protein (g): 72 g (1.2 g/kg) Fluid (ml): 1500 ml (1 ml/kcal)  Based on:   []          Actual BW    [x]          IBW   []            Adjusted BW        Anthropometrics:   IBW : 59.9 kg (132 lb), % IBW (Calculated): 135.28 %     Body mass index is 30.65 kg/(m^2). Last 3 Recorded Weights in this Encounter    04/12/17 0324 04/13/17 0335 04/13/17 1547   Weight: 80.6 kg (177 lb 11.1 oz) 81 kg (178 lb 9.2 oz) 81 kg (178 lb 9.2 oz)    Ht Readings from Last 1 Encounters:   04/13/17 5' 4\" (1.626 m)       Diet:    Active Orders   Diet    DIET REGULAR       Intake:   Patient Vitals for the past 100 hrs:   % Diet Eaten   04/13/17 1416 75 %   04/13/17 0846 75 %   04/12/17 1624 70 %   04/12/17 1143 60 %   04/11/17 1855 75 %   04/11/17 1556 0 %       Education:  __x__Refer to Diabetes Education Record (4/12 & 4/13)            ____Education not indicated at this time          PETER Hannah, MPH, RD

## 2017-04-13 NOTE — PROGRESS NOTES
Bedside and Verbal shift change report given to Jolanta PEARSON (oncoming nurse) by Gladys Keene RN   (offgoing nurse). Report included the following information SBAR, Kardex and MAR.

## 2017-04-14 VITALS
RESPIRATION RATE: 17 BRPM | HEIGHT: 64 IN | SYSTOLIC BLOOD PRESSURE: 138 MMHG | OXYGEN SATURATION: 96 % | DIASTOLIC BLOOD PRESSURE: 77 MMHG | WEIGHT: 178.57 LBS | BODY MASS INDEX: 30.49 KG/M2 | HEART RATE: 75 BPM | TEMPERATURE: 97.3 F

## 2017-04-14 LAB
ANION GAP BLD CALC-SCNC: 8 MMOL/L (ref 3–18)
BASOPHILS # BLD AUTO: 0 K/UL (ref 0–0.06)
BASOPHILS # BLD: 0 % (ref 0–2)
BUN SERPL-MCNC: 18 MG/DL (ref 7–18)
BUN/CREAT SERPL: 18 (ref 12–20)
CALCIUM SERPL-MCNC: 8.9 MG/DL (ref 8.5–10.1)
CHLORIDE SERPL-SCNC: 102 MMOL/L (ref 100–108)
CO2 SERPL-SCNC: 26 MMOL/L (ref 21–32)
CREAT SERPL-MCNC: 0.99 MG/DL (ref 0.6–1.3)
DIFFERENTIAL METHOD BLD: ABNORMAL
EOSINOPHIL # BLD: 0 K/UL (ref 0–0.4)
EOSINOPHIL NFR BLD: 0 % (ref 0–5)
ERYTHROCYTE [DISTWIDTH] IN BLOOD BY AUTOMATED COUNT: 13.9 % (ref 11.6–14.5)
GLUCOSE BLD STRIP.AUTO-MCNC: 214 MG/DL (ref 70–110)
GLUCOSE BLD STRIP.AUTO-MCNC: 301 MG/DL (ref 70–110)
GLUCOSE BLD STRIP.AUTO-MCNC: 362 MG/DL (ref 70–110)
GLUCOSE SERPL-MCNC: 311 MG/DL (ref 74–99)
HCT VFR BLD AUTO: 34.3 % (ref 35–45)
HGB BLD-MCNC: 11.4 G/DL (ref 12–16)
LYMPHOCYTES # BLD AUTO: 19 % (ref 21–52)
LYMPHOCYTES # BLD: 2 K/UL (ref 0.9–3.6)
MCH RBC QN AUTO: 28.6 PG (ref 24–34)
MCHC RBC AUTO-ENTMCNC: 33.2 G/DL (ref 31–37)
MCV RBC AUTO: 86 FL (ref 74–97)
MONOCYTES # BLD: 0.5 K/UL (ref 0.05–1.2)
MONOCYTES NFR BLD AUTO: 4 % (ref 3–10)
NEUTS SEG # BLD: 8.3 K/UL (ref 1.8–8)
NEUTS SEG NFR BLD AUTO: 77 % (ref 40–73)
PLATELET # BLD AUTO: 256 K/UL (ref 135–420)
PMV BLD AUTO: 10.8 FL (ref 9.2–11.8)
POTASSIUM SERPL-SCNC: 4.3 MMOL/L (ref 3.5–5.5)
RBC # BLD AUTO: 3.99 M/UL (ref 4.2–5.3)
SODIUM SERPL-SCNC: 136 MMOL/L (ref 136–145)
WBC # BLD AUTO: 10.8 K/UL (ref 4.6–13.2)

## 2017-04-14 PROCEDURE — 36415 COLL VENOUS BLD VENIPUNCTURE: CPT | Performed by: INTERNAL MEDICINE

## 2017-04-14 PROCEDURE — 94640 AIRWAY INHALATION TREATMENT: CPT

## 2017-04-14 PROCEDURE — 74011636637 HC RX REV CODE- 636/637: Performed by: HOSPITALIST

## 2017-04-14 PROCEDURE — 74011250636 HC RX REV CODE- 250/636: Performed by: INTERNAL MEDICINE

## 2017-04-14 PROCEDURE — 74011636637 HC RX REV CODE- 636/637: Performed by: INTERNAL MEDICINE

## 2017-04-14 PROCEDURE — 82962 GLUCOSE BLOOD TEST: CPT

## 2017-04-14 PROCEDURE — 85025 COMPLETE CBC W/AUTO DIFF WBC: CPT | Performed by: INTERNAL MEDICINE

## 2017-04-14 PROCEDURE — C9113 INJ PANTOPRAZOLE SODIUM, VIA: HCPCS | Performed by: INTERNAL MEDICINE

## 2017-04-14 PROCEDURE — 74011250636 HC RX REV CODE- 250/636: Performed by: HOSPITALIST

## 2017-04-14 PROCEDURE — 80048 BASIC METABOLIC PNL TOTAL CA: CPT | Performed by: INTERNAL MEDICINE

## 2017-04-14 PROCEDURE — 74011000250 HC RX REV CODE- 250: Performed by: INTERNAL MEDICINE

## 2017-04-14 RX ORDER — ALBUTEROL SULFATE 90 UG/1
2 AEROSOL, METERED RESPIRATORY (INHALATION)
Qty: 1 INHALER | Refills: 0 | Status: SHIPPED | OUTPATIENT
Start: 2017-04-14

## 2017-04-14 RX ORDER — INSULIN LISPRO 100 [IU]/ML
10 INJECTION, SOLUTION INTRAVENOUS; SUBCUTANEOUS
Status: DISCONTINUED | OUTPATIENT
Start: 2017-04-14 | End: 2017-04-14 | Stop reason: HOSPADM

## 2017-04-14 RX ORDER — METFORMIN HYDROCHLORIDE 850 MG/1
850 TABLET ORAL 2 TIMES DAILY WITH MEALS
Qty: 60 TAB | Refills: 0 | Status: SHIPPED | OUTPATIENT
Start: 2017-04-14 | End: 2022-10-21

## 2017-04-14 RX ORDER — PREDNISONE 10 MG/1
TABLET ORAL
Qty: 21 TAB | Refills: 0 | Status: SHIPPED | OUTPATIENT
Start: 2017-04-14 | End: 2017-12-30

## 2017-04-14 RX ADMIN — IPRATROPIUM BROMIDE AND ALBUTEROL SULFATE 3 ML: .5; 3 SOLUTION RESPIRATORY (INHALATION) at 12:04

## 2017-04-14 RX ADMIN — INSULIN LISPRO 12 UNITS: 100 INJECTION, SOLUTION INTRAVENOUS; SUBCUTANEOUS at 07:18

## 2017-04-14 RX ADMIN — INSULIN LISPRO 10 UNITS: 100 INJECTION, SOLUTION INTRAVENOUS; SUBCUTANEOUS at 13:04

## 2017-04-14 RX ADMIN — IPRATROPIUM BROMIDE AND ALBUTEROL SULFATE 3 ML: .5; 3 SOLUTION RESPIRATORY (INHALATION) at 07:09

## 2017-04-14 RX ADMIN — SODIUM CHLORIDE 40 MG: 9 INJECTION INTRAMUSCULAR; INTRAVENOUS; SUBCUTANEOUS at 10:25

## 2017-04-14 RX ADMIN — INSULIN LISPRO 10 UNITS: 100 INJECTION, SOLUTION INTRAVENOUS; SUBCUTANEOUS at 10:24

## 2017-04-14 RX ADMIN — INSULIN LISPRO 6 UNITS: 100 INJECTION, SOLUTION INTRAVENOUS; SUBCUTANEOUS at 13:04

## 2017-04-14 RX ADMIN — INSULIN GLARGINE 16 UNITS: 100 INJECTION, SOLUTION SUBCUTANEOUS at 10:25

## 2017-04-14 RX ADMIN — METHYLPREDNISOLONE SODIUM SUCCINATE 40 MG: 40 INJECTION, POWDER, FOR SOLUTION INTRAMUSCULAR; INTRAVENOUS at 10:25

## 2017-04-14 NOTE — ROUTINE PROCESS
Bedside and Verbal shift change report given to DIONNA De Oliveira RN (oncoming nurse) by DIONNA rider RN (offgoing nurse). Report included the following information SBAR, Kardex, MAR and Recent Results.

## 2017-04-14 NOTE — DISCHARGE INSTRUCTIONS
Learning About Asthma Triggers  What are asthma triggers? When you have asthma, certain things can make your symptoms worse. These are called triggers. Learn what triggers an asthma attack for you, and avoid the triggers when you can. Common triggers include colds, smoke, air pollution, dust, pollen, pets, stress, and cold air. How do asthma triggers affect you? Triggers can make it harder for your lungs to work as they should. They can lead to sudden breathing problems and other symptoms. When you are around a trigger, an asthma attack is more likely. If your symptoms are severe, you may need emergency treatment or have to go to the hospital for treatment. What can you do to avoid triggers? The first thing is to know your triggers. When you are having symptoms, note the things around you that might be causing them. Then look for patterns that may be triggering your symptoms. Record your triggers on a piece of paper or in an asthma diary. When you have your list of possible triggers, work with your doctor to find ways to avoid them. Avoid colds and flu. Get a pneumococcal vaccine shot. If you have had one before, ask your doctor whether you need a second dose. Get a flu vaccine every year, as soon as it's available. If you must be around people with colds or the flu, wash your hands often. Here are some ways to avoid a few common triggers. · Do not smoke or allow others to smoke around you. If you need help quitting, talk to your doctor about stop-smoking programs and medicines. These can increase your chances of quitting for good. · If there is a lot of pollution, pollen, or dust outside, stay at home and keep your windows closed. Use an air conditioner or air filter in your home. Check your local weather report or newspaper for air quality and pollen reports. What else should you know? · Take your controller medicine every day, not just when you have symptoms.  It helps prevent problems before they occur. · Your doctor may suggest that you check how well your lungs are working by measuring your peak expiratory flow (PEF) throughout the day. Your PEF may drop when you are near things that trigger symptoms. Where can you learn more? Go to http://nadine-pierce.info/. Enter O038 in the search box to learn more about \"Learning About Asthma Triggers. \"  Current as of: May 23, 2016  Content Version: 11.2  © 2111-0000 [employer]. Care instructions adapted under license by [employer] (which disclaims liability or warranty for this information). If you have questions about a medical condition or this instruction, always ask your healthcare professional. [address] any warranty or liability for your use of this information. Asthma Attack: Care Instructions  Your Care Instructions    During an asthma attack, the airways swell and narrow. This makes it hard to breathe. Severe asthma attacks can be life-threatening, but you can help prevent them by keeping your asthma under control and treating symptoms before they get bad. Symptoms include being short of breath, having chest tightness, coughing, and wheezing. Noting and treating these symptoms can also help you avoid future trips to the emergency room. The doctor has checked you carefully, but problems can develop later. If you notice any problems or new symptoms, get medical treatment right away. Follow-up care is a key part of your treatment and safety. Be sure to make and go to all appointments, and call your doctor if you are having problems. It's also a good idea to know your test results and keep a list of the medicines you take. How can you care for yourself at home? · Follow your asthma action plan to prevent and treat attacks. If you don't have an asthma action plan, work with your doctor to create one. · Take your asthma medicines exactly as prescribed.  Talk to your doctor right away if you have any questions about how to take them. ¨ Use your quick-relief medicine when you have symptoms of an attack. Quick-relief medicine is usually an albuterol inhaler. Some people need to use quick-relief medicine before they exercise. ¨ Take your controller medicine every day, not just when you have symptoms. Controller medicine is usually an inhaled corticosteroid. The goal is to prevent problems before they occur. Don't use your controller medicine to treat an attack that has already started. It doesn't work fast enough to help. ¨ If your doctor prescribed corticosteroid pills to use during an attack, take them exactly as prescribed. It may take hours for the pills to work, but they may make the episode shorter and help you breathe better. ¨ Keep your quick-relief medicine with you at all times. · Talk to your doctor before using other medicines. Some medicines, such as aspirin, can cause asthma attacks in some people. · If you have a peak flow meter, use it to check how well you are breathing. This can help you predict when an asthma attack is going to occur. Then you can take medicine to prevent the asthma attack or make it less severe. · Do not smoke or allow others to smoke around you. Avoid smoky places. Smoking makes asthma worse. If you need help quitting, talk to your doctor about stop-smoking programs and medicines. These can increase your chances of quitting for good. · Learn what triggers an asthma attack for you, and avoid the triggers when you can. Common triggers include colds, smoke, air pollution, dust, pollen, mold, pets, cockroaches, stress, and cold air. · Avoid colds and the flu. Get a pneumococcal vaccine shot. If you have had one before, ask your doctor if you need a second dose. Get a flu vaccine every fall. If you must be around people with colds or the flu, wash your hands often. When should you call for help?   Call 911 anytime you think you may need emergency care. For example, call if:  · You have severe trouble breathing. Call your doctor now or seek immediate medical care if:  · Your symptoms do not get better after you have followed your asthma action plan. · You have new or worse trouble breathing. · Your coughing and wheezing get worse. · You cough up dark brown or bloody mucus (sputum). · You have a new or higher fever. Watch closely for changes in your health, and be sure to contact your doctor if:  · You need to use quick-relief medicine on more than 2 days a week (unless it is just for exercise). · You cough more deeply or more often, especially if you notice more mucus or a change in the color of your mucus. · You are not getting better as expected. Where can you learn more? Go to http://nadine-pierce.info/. Enter T405 in the search box to learn more about \"Asthma Attack: Care Instructions. \"  Current as of: May 23, 2016  Content Version: 11.2  © 0433-4781 "[employer] Care instructions adapted under license by [employer] (which disclaims liability or warranty for this information). If you have questions about a medical condition or this instruction, always ask your healthcare professional. [address] any warranty or liability for your use of this information. Learning About Diabetes Food Guidelines  Your Care Instructions  Meal planning is important to manage diabetes. It helps keep your blood sugar at a target level (which you set with your doctor). You don't have to eat special foods. You can eat what your family eats, including sweets once in a while. But you do have to pay attention to how often you eat and how much you eat of certain foods. You may want to work with a dietitian or a certified diabetes educator (CDE) to help you plan meals and snacks. A dietitian or CDE can also help you lose weight if that is one of your goals.   What should you know about eating carbs? Managing the amount of carbohydrate (carbs) you eat is an important part of healthy meals when you have diabetes. Carbohydrate is found in many foods. · Learn which foods have carbs. And learn the amounts of carbs in different foods. ¨ Bread, cereal, pasta, and rice have about 15 grams of carbs in a serving. A serving is 1 slice of bread (1 ounce), ½ cup of cooked cereal, or 1/3 cup of cooked pasta or rice. ¨ Fruits have 15 grams of carbs in a serving. A serving is 1 small fresh fruit, such as an apple or orange; ½ of a banana; ½ cup of cooked or canned fruit; ½ cup of fruit juice; 1 cup of melon or raspberries; or 2 tablespoons of dried fruit. ¨ Milk and no-sugar-added yogurt have 15 grams of carbs in a serving. A serving is 1 cup of milk or 2/3 cup of no-sugar-added yogurt. ¨ Starchy vegetables have 15 grams of carbs in a serving. A serving is ½ cup of mashed potatoes or sweet potato; 1 cup winter squash; ½ of a small baked potato; ½ cup of cooked beans; or ½ cup cooked corn or green peas. · Learn how much carbs to eat each day and at each meal. A dietitian or CDE can teach you how to keep track of the amount of carbs you eat. This is called carbohydrate counting. · If you are not sure how to count carbohydrate grams, use the Plate Method to plan meals. It is a good, quick way to make sure that you have a balanced meal. It also helps you spread carbs throughout the day. ¨ Divide your plate by types of foods. Put non-starchy vegetables on half the plate, meat or other protein food on one-quarter of the plate, and a grain or starchy vegetable in the final quarter of the plate. To this you can add a small piece of fruit and 1 cup of milk or yogurt, depending on how many carbs you are supposed to eat at a meal.  · Try to eat about the same amount of carbs at each meal. Do not \"save up\" your daily allowance of carbs to eat at one meal.  · Proteins have very little or no carbs per serving.  Examples of proteins are beef, chicken, turkey, fish, eggs, tofu, cheese, cottage cheese, and peanut butter. A serving size of meat is 3 ounces, which is about the size of a deck of cards. Examples of meat substitute serving sizes (equal to 1 ounce of meat) are 1/4 cup of cottage cheese, 1 egg, 1 tablespoon of peanut butter, and ½ cup of tofu. How can you eat out and still eat healthy? · Learn to estimate the serving sizes of foods that have carbohydrate. If you measure food at home, it will be easier to estimate the amount in a serving of restaurant food. · If the meal you order has too much carbohydrate (such as potatoes, corn, or baked beans), ask to have a low-carbohydrate food instead. Ask for a salad or green vegetables. · If you use insulin, check your blood sugar before and after eating out to help you plan how much to eat in the future. · If you eat more carbohydrate at a meal than you had planned, take a walk or do other exercise. This will help lower your blood sugar. What else should you know? · Limit saturated fat, such as the fat from meat and dairy products. This is a healthy choice because people who have diabetes are at higher risk of heart disease. So choose lean cuts of meat and nonfat or low-fat dairy products. Use olive or canola oil instead of butter or shortening when cooking. · Don't skip meals. Your blood sugar may drop too low if you skip meals and take insulin or certain medicines for diabetes. · Check with your doctor before you drink alcohol. Alcohol can cause your blood sugar to drop too low. Alcohol can also cause a bad reaction if you take certain diabetes medicines. Follow-up care is a key part of your treatment and safety. Be sure to make and go to all appointments, and call your doctor if you are having problems. It's also a good idea to know your test results and keep a list of the medicines you take. Where can you learn more?   Go to http://nadine-pierce.info/. Enter Z452 in the search box to learn more about \"Learning About Diabetes Food Guidelines. \"  Current as of: May 23, 2016  Content Version: 11.2  © 4246-7216 [email or web address]. Care instructions adapted under license by [employer] (which disclaims liability or warranty for this information). If you have questions about a medical condition or this instruction, always ask your healthcare professional. [place] [address] any warranty or liability for your use of this information. Home Blood Glucose Test: About This Test  What is it? A home blood glucose test measures the amount of a type of sugar, called glucose, in your blood. Why is this test done? People who have diabetes need to check the amount of glucose in their blood. A home blood glucose test is an easy way to test your blood at home or when you are away from home. The results help you know when to take action to keep your blood glucose levels in a target range. How can you prepare for the test?  · Check the expiration date on the bottle of testing strips. Do not use test strips that have . · Match the code number on the testing strips bottle with the number on the meter. If the numbers do not match, follow the directions with the meter for changing the code number. What happens before the test?  The supplies you will need for testing blood glucose include:  · A blood glucose meter. · Testing strips. These are made to be used with a specific model of meter. · Sugar control solutions. Some meters require a specific solution. Many new meters are made to operate without a control solution. · Short needles called lancets for pricking your skin. · A pen-sized augustin for the lancet (lancet device), which positions the lancet and controls how deeply it goes into your skin. · Clean cotton balls. These are used to stop the bleeding from the testing site.   What happens during the test?  A home blood glucose test involves pricking your finger, palm, or forearm with a lancet to collect a drop of blood. The blood drop is placed on a test strip, which you insert into the blood glucose meter. The instructions for testing are slightly different for each blood glucose meter model. Follow the instructions that came with your meter. · Wash your hands with warm, soapy water. Dry them well with a clean towel. You may also use an alcohol wipe to clean your finger or other site, but make sure your hands are dry before the test.  · Insert a clean lancet into the lancet device. · Remove a test strip from the test strip bottle. Replace the lid immediately to keep moisture away from the other strips. · Follow the instructions that came with your meter to get it ready. · Use the lancet device to stick the side of your fingertip with the lancet. Do not stick the tip of your finger. Some blood sugar meters use lancet devices that take the blood sample from other sites, such as the palm of the hand or the forearm. But the finger is usually the most accurate place to test blood sugar. · Put a drop of blood on the correct spot on the test strip. · Apply pressure with a clean cotton ball to stop the bleeding. · Follow the directions that came with the meter to get the results. · Write down the results and the time that you tested your blood. Some meters will store the results for you. What else should you know about the test?  The American Diabetes Association (ADA) recommends that you stay within the following blood glucose level ranges. But depending on your health, you and your doctor may set a different range for you.   For nonpregnant adults with diabetes:  · 80 milligrams per deciliter (mg/dL) to 130 mg/dL before a meal  · Less than 180 mg/dL 1 to 2 hours after a meal  For women who have diabetes related to pregnancy (gestational diabetes):  · 95 mg/dL or less before breakfast  · 120 to 140 mg/dL (or lower) 1 to 2 hours after a meal  How long does the test take? · The blood glucose meter will show the results of the test in a minute or less. Where can you learn more? Go to http://nadine-pierce.info/. Enter I772 in the search box to learn more about \"Home Blood Glucose Test: About This Test.\"  Current as of: May 23, 2016  Content Version: 11.2  © 3730-6691 [employer]. Care instructions adapted under license by [employer] (which disclaims liability or warranty for this information). If you have questions about a medical condition or this instruction, always ask your healthcare professional. [address] any warranty or liability for your use of this information. Learning About Type 2 Diabetes  What is type 2 diabetes? Insulin is a hormone that helps your body use sugar from your food as energy. Type 2 diabetes happens when your body can't use insulin the right way. Over time, the pancreas can't make enough insulin. If you don't have enough insulin, too much sugar stays in your blood. If you are overweight, get little or no exercise, or have type 2 diabetes in your family, you are more likely to have problems with the way insulin works in your body.  Americans, Hispanics, Native Americans,  Americans, and Pacific Islanders have a higher risk for type 2 diabetes. Type 2 diabetes can be prevented or delayed with a healthy lifestyle, which includes staying at a healthy weight, making smart food choices, and getting regular exercise. What can you expect with type 2 diabetes? Kenia Solo keep hearing about how important it is to keep your blood sugar within a target range. That's because over time, high blood sugar can lead to serious problems. It can:  · Harm your eyes, nerves, and kidneys. · Damage your blood vessels, leading to heart disease and stroke.   · Reduce blood flow and cause nerve damage to parts of your body, especially your feet. This can cause slow healing and pain when you walk. · Make your immune system weak and less able to fight infections. When people hear the word \"diabetes,\" they often think of problems like these. But daily care and treatment can help prevent or delay these problems. The goal is to keep your blood sugar in a target range. That's the best way to reduce your chance of having more problems from diabetes. What are the symptoms? Some people who have type 2 diabetes may not have any symptoms early on. Many people with the disease don't even know they have it at first. But with time, diabetes starts to cause symptoms. You experience most symptoms of type 2 diabetes when your blood sugar is either too high or too low. The most common symptoms of high blood sugar include:  · Thirst.  · Frequent urination. · Weight loss. · Blurry vision. The symptoms of low blood sugar include:  · Sweating. · Shakiness. · Weakness. · Hunger. · Confusion. How can you prevent type 2 diabetes? The best way to prevent or delay type 2 diabetes is to adopt healthy habits, which include:  · Staying at a healthy weight. · Exercising regularly. · Eating healthy foods. How is type 2 diabetes treated? If you have type 2 diabetes, here are the most important things you can do. · Take your diabetes medicines. · Check your blood sugar as often as your doctor recommends. Also, get a hemoglobin A1c test at least every 6 months. · Try to eat a variety of foods and to spread carbohydrate throughout the day. Carbohydrate raises blood sugar higher and more quickly than any other nutrient does. Carbohydrate is found in sugar, breads and cereals, fruit, starchy vegetables such as potatoes and corn, and milk and yogurt. · Get at least 30 minutes of exercise on most days of the week. Walking is a good choice.  You also may want to do other activities, such as running, swimming, cycling, or playing tennis or team sports. If your doctor says it's okay, do muscle-strengthening exercises at least 2 times a week. · See your doctor for checkups and tests on a regular schedule. · If you have high blood pressure or high cholesterol, take the medicines as prescribed by your doctor. · Do not smoke. Smoking can make health problems worse. This includes problems you might have with type 2 diabetes. If you need help quitting, talk to your doctor about stop-smoking programs and medicines. These can increase your chances of quitting for good. Follow-up care is a key part of your treatment and safety. Be sure to make and go to all appointments, and call your doctor if you are having problems. It's also a good idea to know your test results and keep a list of the medicines you take. Where can you learn more? Go to http://nadine-pierce.info/. Enter I746 in the search box to learn more about \"Learning About Type 2 Diabetes. \"  Current as of: May 23, 2016  Content Version: 11.2  © 8021-1118 [employer]. Care instructions adapted under license by [employer] (which disclaims liability or warranty for this information). If you have questions about a medical condition or this instruction, always ask your healthcare professional. [place] [address] any warranty or liability for your use of this information. Lab Results   Component Value Date/Time    Hemoglobin A1c 7.3 04/11/2017 11:26 AM       This lab test reflects that your blood sugar has been slightly elevated over the past 3 months and should be evaluated by your primary care provider. An A1C of 5.7-6.4% meets the criteria for pre-diabetes; an A1C of 6.5% or higher meets the criteria for diabetes. Steroids can increase your blood sugar so it is important to follow up with your provider to determine if your blood sugar has returned to normal or needs further treatment.     This lab test reflects that your blood sugar averaged 163 mg/dl  over the past 3 months. It is important to follow up with your provider on a routine basis to continue to evaluate your blood sugar and discuss any necessary changes in treatment.

## 2017-04-14 NOTE — PROGRESS NOTES
Assumed care of patient at this time. Pt alert and oriented times 4. Patient resting in bed, no complaints of chest pain, SOB, numbness or tingling at this time. Pt lung sound are clear and diminished at bases. Pt has 20 g in right hand CDI. Pt up aid without assistance. Call bell, telephone, and personal belongings within reach.     Dual AVS reviewed with Maicol Rivera RN. All medications reviewed individually with patient. Opportunities for questions and concerns provided. Patient discharged via (mode of transport ie. Car, ambulance or air transport) car with family member. PIV removed and dry dressing applied. Prior to discharge pt received 16 unit of insulin, . Pt consumed her lunch prior to discharge. Patient's arm band appropriately discarded.

## 2017-04-14 NOTE — DISCHARGE SUMMARY
Discharge Summary    Patient: Cayetano Hsu MRN: [id number]  CSN: [id number]    YOB: 1970  Age: 42 y.o. Sex: female    DOA: 4/11/2017 LOS:  LOS: 3 days   Discharge Date:      Primary Care Provider:  None    Admission Diagnoses: Asthma with acute exacerbation    Discharge Diagnoses:    Patient Active Problem List   Diagnosis Code    Asthma with acute exacerbation J45. 0    Obesity E66.9    Type II diabetes mellitus ([address] E11.9       Discharge Condition: Stable    Discharge Medications:     Current Discharge Medication List      START taking these medications    Details   metFORMIN (GLUCOPHAGE) 850 mg tablet Take 1 Tab by mouth two (2) times daily (with meals). Qty: 60 Tab, Refills: 0      Diabetic Supplies, Miscellan. kit 1 Each by Does Not Apply route daily. Qty: 1 Kit, Refills: 0      predniSONE (STERAPRED DS) 10 mg dose pack See administration instruction per 10mg dose pack  Qty: 21 Tab, Refills: 0         CONTINUE these medications which have CHANGED    Details   albuterol (VENTOLIN HFA) 90 mcg/actuation inhaler Take 2 Puffs by inhalation every six (6) hours as needed for Wheezing. Qty: 1 Inhaler, Refills: 0         CONTINUE these medications which have NOT CHANGED    Details   multivitamin, tx-iron-ca-min (THERA-M W/ IRON) 9 mg iron-400 mcg tab tablet Take 1 Tab by mouth daily. Formulary substitution for DAILY MULTIVITAMIN      carisoprodol (SOMA) 350 mg tablet Take 1 Tab by mouth four (4) times daily. Max Daily Amount: 1,400 mg. Qty: 20 Tab, Refills: 0             Procedures : none     Consults: None      PHYSICAL EXAM   Visit Vitals    /77 (BP 1 Location: Left arm, BP Patient Position: At rest)    Pulse 75    Temp 97.3 °F (36.3 °C)    Resp 17    Ht 5' 4\" (1.626 m)    Wt 81 kg (178 lb 9.2 oz)    SpO2 97%    BMI 30.65 kg/m2     General: Awake, cooperative, no acute distress    HEENT: NC, Atraumatic. PERRLA, EOMI. Anicteric sclerae. Lungs:  CTA Bilaterally.  No Wheezing/Rhonchi/Rales. Heart:  Regular  rhythm,  No murmur, No Rubs, No Gallops  Abdomen: Soft, Non distended, Non tender. +Bowel sounds,   Extremities: No c/c/e  Psych:   Not anxious or agitated. Neurologic:  No acute neurological deficits. Admission HPI :   Teresa Lo is a 42 y.o. female with past medical history significant for asthma and obesity presents to the ER with 3-4 days of worsening shortness of breath & wheezing. She recently returned from [place] and following her return her respiatory status worsened. She denies cough, fever, sputum production, nausea or vomiting.      On presentation to the ER she was having respiratory difficutly, audible wheezes, tachycardia w o2 sats in mid 90s. CXR clear Medicine is asked to admit for further management. Hospital Course : For her asthma exacerbation, iv steroid and breathing tx were administrated. She had prediabetic and she was confirmed new DM with a1c 7.3. Diabetic education was given and her glucose was controlled per insulin during her stay. With the treatment, her sob and wheezing resolved, she was d/c with tapering steroid and metformin for DM. Lifestyle modification was explained to patient with side effects of medication, she indicated a verbal understanding.      Activity: Activity as tolerated    Diet: Diabetic Diet    Follow-up: kalyani Cates, Vipul Hurst     Disposition: home     Minutes spent on discharge: 60 min       Labs: Results:       Chemistry Recent Labs      04/14/17 0221 04/13/17   0213  04/12/17   0500   GLU  311*  304*  259*   NA  136  136  137   K  4.3  4.7  4.2   CL  102  103  103   CO2  26  24  24   BUN  18  20*  13   CREA  0.99  0.96  0.93   CA  8.9  8.9  8.9   AGAP  8  9  10   BUCR  18  21*  14      CBC w/Diff Recent Labs      04/14/17 0221 04/13/17   0213  04/12/17   0500   WBC  10.8  12.6  11.4   RBC  3.99*  4.03*  3.99*   HGB  11.4*  11.5*  11.4*   HCT  34.3*  34.7*  33.9*   PLT  256 261  239   GRANS  77*  80*  82*   LYMPH  19*  15*  16*   EOS  0  0  0      Cardiac Enzymes No results for input(s): CPK, CKND1, STORM in the last 72 hours. No lab exists for component: CKRMB, TROIP   Coagulation No results for input(s): PTP, INR, APTT in the last 72 hours. No lab exists for component: INREXT    Lipid Panel No results found for: CHOL, CHOLPOCT, CHOLX, CHLST, CHOLV, [id number], HDL, LDL, NLDLCT, DLDL, LDLC, DLDLP, 872403, VLDLC, VLDL, TGL, TGLX, TRIGL, [id number], TRIGP, TGLPOCT, [id number], CHHD, CHHDX   BNP No results for input(s): BNPP in the last 72 hours. Liver Enzymes No results for input(s): TP, ALB, TBIL, AP, SGOT, GPT in the last 72 hours. No lab exists for component: DBIL   Thyroid Studies Lab Results   Component Value Date/Time    TSH 2.75 07/14/2010 05:15 PM            Significant Diagnostic Studies: Xr Chest Port    Result Date: 4/11/2017  Chest, single view Indication: Asthma attack, shortness of breath, wheezing Comparison: July 14, 2010 Findings:  Portable upright AP view of the chest was obtained. The lungs are underexpanded. There is no focal pneumonic opacity, pneumothorax, or pleural effusion. Cardiac size, mediastinal contours, pulmonary vascularity are within normal limits. No acute osseous abnormality. Impression: Mildly underexpanded lungs without radiographic evidence of acute abnormality.             [facility]     CC: None

## 2017-04-14 NOTE — PROGRESS NOTES
Shift summary: Patient A/Ox4. Expiratory wheezing to left lung, diminished on right. Productive cough. Denied pain. Up ad luana, voided. Ana Merritt to go home.     Patient Vitals for the past 8 hrs:   Temp Pulse Resp BP SpO2   04/14/17 0328 97.7 °F (36.5 °C) 81 17 133/74 97 %   04/13/17 2356 - - - - 96 %   04/13/17 2250 97.5 °F (36.4 °C) 61 16 125/75 96 %

## 2017-04-14 NOTE — PROGRESS NOTES
1935 Assumed care of patient from AVI Dean RN. Shift assessment initiated. Family at bedside. Denies pain. All  questions and concerns answered. All need met. Bed in the lowest position. Call bell within reach. ICS encouraged. No further needs at this time. 2315 Bedside and Verbal shift change report given to ZORA Pratt RN (oncoming nurse) by AVI Eubanks RN (offgoing nurse). Report included the following information SBAR and Kardex.

## 2017-04-14 NOTE — ROUTINE PROCESS
Bedside and Verbal shift change report given to LUIS Jaquez RN (oncoming nurse) by Amber Dejesus RN   (offgoing nurse). Report included the following information SBAR, Kardex and MAR.

## 2017-12-30 ENCOUNTER — HOSPITAL ENCOUNTER (EMERGENCY)
Age: 47
Discharge: HOME | End: 2017-12-30
Attending: EMERGENCY MEDICINE
Payer: COMMERCIAL

## 2017-12-30 ENCOUNTER — APPOINTMENT (OUTPATIENT)
Dept: GENERAL RADIOLOGY | Age: 47
End: 2017-12-30
Attending: EMERGENCY MEDICINE
Payer: COMMERCIAL

## 2017-12-30 ENCOUNTER — APPOINTMENT (OUTPATIENT)
Dept: MRI IMAGING | Age: 47
End: 2017-12-30
Attending: EMERGENCY MEDICINE
Payer: COMMERCIAL

## 2017-12-30 ENCOUNTER — APPOINTMENT (OUTPATIENT)
Dept: CT IMAGING | Age: 47
End: 2017-12-30
Attending: EMERGENCY MEDICINE
Payer: COMMERCIAL

## 2017-12-30 VITALS
HEART RATE: 57 BPM | WEIGHT: 197 LBS | SYSTOLIC BLOOD PRESSURE: 103 MMHG | TEMPERATURE: 99.2 F | HEIGHT: 65 IN | DIASTOLIC BLOOD PRESSURE: 64 MMHG | BODY MASS INDEX: 32.82 KG/M2 | RESPIRATION RATE: 16 BRPM | OXYGEN SATURATION: 100 %

## 2017-12-30 DIAGNOSIS — N30.01 ACUTE CYSTITIS WITH HEMATURIA: ICD-10-CM

## 2017-12-30 DIAGNOSIS — R29.898 WEAKNESS OF LEFT ARM: ICD-10-CM

## 2017-12-30 DIAGNOSIS — R29.898 WEAKNESS OF LEFT LEG: ICD-10-CM

## 2017-12-30 DIAGNOSIS — R51.9 ACUTE NONINTRACTABLE HEADACHE, UNSPECIFIED HEADACHE TYPE: Primary | ICD-10-CM

## 2017-12-30 LAB
ALBUMIN SERPL-MCNC: 3.5 G/DL (ref 3.4–5)
ALBUMIN/GLOB SERPL: 0.7 {RATIO} (ref 0.8–1.7)
ALP SERPL-CCNC: 93 U/L (ref 45–117)
ALT SERPL-CCNC: 121 U/L (ref 13–56)
ANION GAP SERPL CALC-SCNC: 10 MMOL/L (ref 3–18)
APPEARANCE UR: ABNORMAL
APTT PPP: 27.5 SEC (ref 23–36.4)
AST SERPL-CCNC: 83 U/L (ref 15–37)
BACTERIA URNS QL MICRO: ABNORMAL /HPF
BASOPHILS # BLD: 0 K/UL (ref 0–0.06)
BASOPHILS NFR BLD: 0 % (ref 0–2)
BILIRUB SERPL-MCNC: 0.7 MG/DL (ref 0.2–1)
BILIRUB UR QL: NEGATIVE
BUN SERPL-MCNC: 8 MG/DL (ref 7–18)
BUN/CREAT SERPL: 10 (ref 12–20)
CALCIUM SERPL-MCNC: 9.3 MG/DL (ref 8.5–10.1)
CHLORIDE SERPL-SCNC: 106 MMOL/L (ref 100–108)
CK MB CFR SERPL CALC: 0.6 % (ref 0–4)
CK MB SERPL-MCNC: 1.2 NG/ML (ref 5–25)
CK SERPL-CCNC: 206 U/L (ref 26–192)
CO2 SERPL-SCNC: 27 MMOL/L (ref 21–32)
COLOR UR: YELLOW
CREAT SERPL-MCNC: 0.8 MG/DL (ref 0.6–1.3)
DIFFERENTIAL METHOD BLD: NORMAL
EOSINOPHIL # BLD: 0.1 K/UL (ref 0–0.4)
EOSINOPHIL NFR BLD: 2 % (ref 0–5)
EPITH CASTS URNS QL MICRO: ABNORMAL /LPF (ref 0–5)
ERYTHROCYTE [DISTWIDTH] IN BLOOD BY AUTOMATED COUNT: 14 % (ref 11.6–14.5)
GLOBULIN SER CALC-MCNC: 4.7 G/DL (ref 2–4)
GLUCOSE SERPL-MCNC: 123 MG/DL (ref 74–99)
GLUCOSE UR STRIP.AUTO-MCNC: NEGATIVE MG/DL
HCG UR QL: NEGATIVE
HCT VFR BLD AUTO: 36.1 % (ref 35–45)
HGB BLD-MCNC: 12 G/DL (ref 12–16)
HGB UR QL STRIP: ABNORMAL
INR PPP: 1.1 (ref 0.8–1.2)
KETONES UR QL STRIP.AUTO: NEGATIVE MG/DL
LEUKOCYTE ESTERASE UR QL STRIP.AUTO: ABNORMAL
LYMPHOCYTES # BLD: 2.3 K/UL (ref 0.9–3.6)
LYMPHOCYTES NFR BLD: 42 % (ref 21–52)
MCH RBC QN AUTO: 28 PG (ref 24–34)
MCHC RBC AUTO-ENTMCNC: 33.2 G/DL (ref 31–37)
MCV RBC AUTO: 84.1 FL (ref 74–97)
MONOCYTES # BLD: 0.4 K/UL (ref 0.05–1.2)
MONOCYTES NFR BLD: 7 % (ref 3–10)
NEUTS SEG # BLD: 2.7 K/UL (ref 1.8–8)
NEUTS SEG NFR BLD: 49 % (ref 40–73)
NITRITE UR QL STRIP.AUTO: NEGATIVE
PH UR STRIP: 5.5 [PH] (ref 5–8)
PLATELET # BLD AUTO: 261 K/UL (ref 135–420)
PMV BLD AUTO: 10 FL (ref 9.2–11.8)
POTASSIUM SERPL-SCNC: 3.5 MMOL/L (ref 3.5–5.5)
PROT SERPL-MCNC: 8.2 G/DL (ref 6.4–8.2)
PROT UR STRIP-MCNC: 30 MG/DL
PROTHROMBIN TIME: 13.5 SEC (ref 11.5–15.2)
RBC # BLD AUTO: 4.29 M/UL (ref 4.2–5.3)
RBC #/AREA URNS HPF: ABNORMAL /HPF (ref 0–5)
SODIUM SERPL-SCNC: 143 MMOL/L (ref 136–145)
SP GR UR REFRACTOMETRY: 1.02 (ref 1–1.03)
TRICHOMONAS UR QL MICRO: ABNORMAL
TROPONIN I SERPL-MCNC: <0.02 NG/ML (ref 0–0.06)
UROBILINOGEN UR QL STRIP.AUTO: 1 EU/DL (ref 0.2–1)
WBC # BLD AUTO: 5.5 K/UL (ref 4.6–13.2)
WBC URNS QL MICRO: ABNORMAL /HPF (ref 0–5)

## 2017-12-30 PROCEDURE — 74011250637 HC RX REV CODE- 250/637: Performed by: EMERGENCY MEDICINE

## 2017-12-30 PROCEDURE — 96374 THER/PROPH/DIAG INJ IV PUSH: CPT

## 2017-12-30 PROCEDURE — 80053 COMPREHEN METABOLIC PANEL: CPT | Performed by: EMERGENCY MEDICINE

## 2017-12-30 PROCEDURE — 70551 MRI BRAIN STEM W/O DYE: CPT

## 2017-12-30 PROCEDURE — 93005 ELECTROCARDIOGRAM TRACING: CPT

## 2017-12-30 PROCEDURE — 96375 TX/PRO/DX INJ NEW DRUG ADDON: CPT

## 2017-12-30 PROCEDURE — 74011250636 HC RX REV CODE- 250/636: Performed by: EMERGENCY MEDICINE

## 2017-12-30 PROCEDURE — 85730 THROMBOPLASTIN TIME PARTIAL: CPT | Performed by: EMERGENCY MEDICINE

## 2017-12-30 PROCEDURE — 99285 EMERGENCY DEPT VISIT HI MDM: CPT

## 2017-12-30 PROCEDURE — 85025 COMPLETE CBC W/AUTO DIFF WBC: CPT | Performed by: EMERGENCY MEDICINE

## 2017-12-30 PROCEDURE — 85610 PROTHROMBIN TIME: CPT | Performed by: EMERGENCY MEDICINE

## 2017-12-30 PROCEDURE — 71010 XR CHEST PORT: CPT

## 2017-12-30 PROCEDURE — 81001 URINALYSIS AUTO W/SCOPE: CPT | Performed by: EMERGENCY MEDICINE

## 2017-12-30 PROCEDURE — 96361 HYDRATE IV INFUSION ADD-ON: CPT

## 2017-12-30 PROCEDURE — 70450 CT HEAD/BRAIN W/O DYE: CPT

## 2017-12-30 PROCEDURE — 81025 URINE PREGNANCY TEST: CPT

## 2017-12-30 PROCEDURE — 82550 ASSAY OF CK (CPK): CPT | Performed by: EMERGENCY MEDICINE

## 2017-12-30 RX ORDER — KETOROLAC TROMETHAMINE 15 MG/ML
15 INJECTION, SOLUTION INTRAMUSCULAR; INTRAVENOUS
Status: COMPLETED | OUTPATIENT
Start: 2017-12-30 | End: 2017-12-30

## 2017-12-30 RX ORDER — ASPIRIN 325 MG
325 TABLET ORAL
Status: COMPLETED | OUTPATIENT
Start: 2017-12-30 | End: 2017-12-30

## 2017-12-30 RX ORDER — METOCLOPRAMIDE HYDROCHLORIDE 5 MG/ML
10 INJECTION INTRAMUSCULAR; INTRAVENOUS
Status: COMPLETED | OUTPATIENT
Start: 2017-12-30 | End: 2017-12-30

## 2017-12-30 RX ORDER — SULFAMETHOXAZOLE AND TRIMETHOPRIM 800; 160 MG/1; MG/1
1 TABLET ORAL 2 TIMES DAILY
Qty: 20 TAB | Refills: 0 | Status: SHIPPED | OUTPATIENT
Start: 2017-12-30 | End: 2018-01-09

## 2017-12-30 RX ADMIN — SODIUM CHLORIDE 1000 ML: 900 INJECTION, SOLUTION INTRAVENOUS at 16:02

## 2017-12-30 RX ADMIN — KETOROLAC TROMETHAMINE 15 MG: 15 INJECTION, SOLUTION INTRAMUSCULAR; INTRAVENOUS at 16:04

## 2017-12-30 RX ADMIN — ASPIRIN 325 MG ORAL TABLET 325 MG: 325 PILL ORAL at 16:02

## 2017-12-30 RX ADMIN — METOCLOPRAMIDE 10 MG: 5 INJECTION, SOLUTION INTRAMUSCULAR; INTRAVENOUS at 16:04

## 2017-12-30 NOTE — ED PROVIDER NOTES
EMERGENCY DEPARTMENT HISTORY AND PHYSICAL EXAM    Date: 12/30/2017  Patient Name: Starla Juárez    History of Presenting Illness     Chief Complaint   Patient presents with    Arm Pain         History Provided By: Patient    Chief Complaint: Arm pain  Duration: 24 Hours  Timing:  Acute  Location: Left arm, left leg  Severity: 8 out of 10  Associated Symptoms: Left-sided weakness, headache    Additional History (Context):   2:59 PM  Starla Juárez is a 41 y.o. female with PMHX of DM (takes oral Metformin) and HTN who presents to the emergency department C/O 8/10 left arm pain which extends through her left leg, onset yesterday. Associated sxs include diffuse left-sided pain, left sided-weakness, headache. Pt states that she noticed Pt is a former smoker; her last cigarette was a couple of weeks ago (1 cigarette every other day). Pt denies recent MVA, recent falls, slurred speech, memory, word comprehension, and any other sxs or complaints. PCP: Danny Moy MD    Current Outpatient Prescriptions   Medication Sig Dispense Refill    ATORVASTATIN CALCIUM (LIPITOR PO) Take  by mouth nightly.  trimethoprim-sulfamethoxazole (BACTRIM DS) 160-800 mg per tablet Take 1 Tab by mouth two (2) times a day for 10 days. 20 Tab 0    albuterol (VENTOLIN HFA) 90 mcg/actuation inhaler Take 2 Puffs by inhalation every six (6) hours as needed for Wheezing. 1 Inhaler 0    metFORMIN (GLUCOPHAGE) 850 mg tablet Take 1 Tab by mouth two (2) times daily (with meals). 60 Tab 0    Diabetic Supplies, Miscellan. kit 1 Each by Does Not Apply route daily. 1 Kit 0    multivitamin, tx-iron-ca-min (THERA-M W/ IRON) 9 mg iron-400 mcg tab tablet Take 1 Tab by mouth daily. Formulary substitution for DAILY MULTIVITAMIN      carisoprodol (SOMA) 350 mg tablet Take 1 Tab by mouth four (4) times daily.  Max Daily Amount: 1,400 mg. 20 Tab 0       Past History     Past Medical History:  Past Medical History:   Diagnosis Date    Asthma     Diabetes ([facility] [address]     High cholesterol     Hypertension        Past Surgical History:  History reviewed. No pertinent surgical history. Family History:  History reviewed. No pertinent family history. Social History:  Social History   Substance Use Topics    Smoking status: Former Smoker     Quit date: 4/11/2015    Smokeless tobacco: Never Used    Alcohol use No       Allergies:  No Known Allergies      Review of Systems   Review of Systems   Musculoskeletal: Positive for myalgias (Left arm pain, left leg pain, left-sided pain/weakness). Neurological: Positive for weakness (Left-sided) and headaches. All other systems reviewed and are negative. Physical Exam     Vitals:    12/30/17 1607 12/30/17 1608 12/30/17 1615 12/30/17 1630   BP: 124/76  120/80 103/64   Pulse:  64 88 (!) 57   Resp:  16 26 16   Temp:       SpO2:  100% (!) 79% 100%   Weight:       Height:         Physical Exam   Nursing note and vitals reviewed. Vital signs and nursing notes reviewed    CONSTITUTIONAL: Alert, in no apparent distress; well-developed; well-nourished. HEAD:  Normocephalic, atraumatic  EYES: PERRL; EOM's intact. ENTM: Nose: no rhinorrhea; Throat: no erythema or exudate, mucous membranes moist  Neck:  No JVD, supple without lymphadenopathy  RESP: Chest clear, equal breath sounds. CV: S1 and S2 WNL; No murmurs, gallops or rubs. GI: Normal bowel sounds, abdomen soft and non-tender. No masses or organomegaly. : No costo-vertebral angle tenderness. BACK:  Non-tender  UPPER EXT:  Normal inspection  LOWER EXT: No edema, no calf tenderness. Distal pulses intact. NEURO: CN intact, reflexes 2/4 and sym, strength 5/5 in right arm/leg and 4/5 in left arm/leg, normal SIOBHAN, normal Romberg, normal finger to nose, no pronator drift, sensation intact. SKIN: No rashes; Normal for age and stage. PSYCH:  Alert and oriented, normal affect.       Diagnostic Study Results     Labs -     Recent Results (from the past 12 hour(s)) URINALYSIS W/ RFLX MICROSCOPIC    Collection Time: 12/30/17  1:45 PM   Result Value Ref Range    Color YELLOW      Appearance CLOUDY      Specific gravity 1.018 1.005 - 1.030      pH (UA) 5.5 5.0 - 8.0      Protein 30 (A) NEG mg/dL    Glucose NEGATIVE  NEG mg/dL    Ketone NEGATIVE  NEG mg/dL    Bilirubin NEGATIVE  NEG      Blood LARGE (A) NEG      Urobilinogen 1.0 0.2 - 1.0 EU/dL    Nitrites NEGATIVE  NEG      Leukocyte Esterase SMALL (A) NEG     URINE MICROSCOPIC ONLY    Collection Time: 12/30/17  1:45 PM   Result Value Ref Range    WBC 2 to 4 0 - 5 /hpf    RBC 10 to 15 0 - 5 /hpf    Epithelial cells 1+ 0 - 5 /lpf    Bacteria FEW (A) NEG /hpf    Trichomonas 1+ (A) NEG   EKG, 12 LEAD, INITIAL    Collection Time: 12/30/17  3:00 PM   Result Value Ref Range    Ventricular Rate 68 BPM    Atrial Rate 68 BPM    P-R Interval 164 ms    QRS Duration 100 ms    Q-T Interval 412 ms    QTC Calculation (Bezet) 438 ms    Calculated P Axis 25 degrees    Calculated R Axis 12 degrees    Calculated T Axis 12 degrees    Diagnosis       Normal sinus rhythm  Nonspecific T wave abnormality  Abnormal ECG  When compared with ECG of 11-APR-2017 11:28,  No significant change was found     CBC WITH AUTOMATED DIFF    Collection Time: 12/30/17  3:15 PM   Result Value Ref Range    WBC 5.5 4.6 - 13.2 K/uL    RBC 4.29 4. 20 - 5.30 M/uL    HGB 12.0 12.0 - 16.0 g/dL    HCT 36.1 35.0 - 45.0 %    MCV 84.1 74.0 - 97.0 FL    MCH 28.0 24.0 - 34.0 PG    MCHC 33.2 31.0 - 37.0 g/dL    RDW 14.0 11.6 - 14.5 %    PLATELET 099 277 - 719 K/uL    MPV 10.0 9.2 - 11.8 FL    NEUTROPHILS 49 40 - 73 %    LYMPHOCYTES 42 21 - 52 %    MONOCYTES 7 3 - 10 %    EOSINOPHILS 2 0 - 5 %    BASOPHILS 0 0 - 2 %    ABS. NEUTROPHILS 2.7 1.8 - 8.0 K/UL    ABS. LYMPHOCYTES 2.3 0.9 - 3.6 K/UL    ABS. MONOCYTES 0.4 0.05 - 1.2 K/UL    ABS. EOSINOPHILS 0.1 0.0 - 0.4 K/UL    ABS.  BASOPHILS 0.0 0.0 - 0.06 K/UL    DF AUTOMATED     METABOLIC PANEL, COMPREHENSIVE    Collection Time: 12/30/17 3:15 PM   Result Value Ref Range    Sodium 143 136 - 145 mmol/L    Potassium 3.5 3.5 - 5.5 mmol/L    Chloride 106 100 - 108 mmol/L    CO2 27 21 - 32 mmol/L    Anion gap 10 3.0 - 18 mmol/L    Glucose 123 (H) 74 - 99 mg/dL    BUN 8 7.0 - 18 MG/DL    Creatinine 0.80 0.6 - 1.3 MG/DL    BUN/Creatinine ratio 10 (L) 12 - 20      GFR est AA >60 >60 ml/min/1.73m2    GFR est non-AA >60 >60 ml/min/1.73m2    Calcium 9.3 8.5 - 10.1 MG/DL    Bilirubin, total 0.7 0.2 - 1.0 MG/DL    ALT (SGPT) 121 (H) 13 - 56 U/L    AST (SGOT) 83 (H) 15 - 37 U/L    Alk. phosphatase 93 45 - 117 U/L    Protein, total 8.2 6.4 - 8.2 g/dL    Albumin 3.5 3.4 - 5.0 g/dL    Globulin 4.7 (H) 2.0 - 4.0 g/dL    A-G Ratio 0.7 (L) 0.8 - 1.7     PTT    Collection Time: 12/30/17  3:15 PM   Result Value Ref Range    aPTT 27.5 23.0 - 36.4 SEC   PROTHROMBIN TIME + INR    Collection Time: 12/30/17  3:15 PM   Result Value Ref Range    Prothrombin time 13.5 11.5 - 15.2 sec    INR 1.1 0.8 - 1.2     CARDIAC PANEL,(CK, CKMB & TROPONIN)    Collection Time: 12/30/17  3:15 PM   Result Value Ref Range     (H) 26 - 192 U/L    CK - MB 1.2 <3.6 ng/ml    CK-MB Index 0.6 0.0 - 4.0 %    Troponin-I, Qt. <0.02 0.00 - 0.06 NG/ML   HCG URINE, QL. - POC    Collection Time: 12/30/17  3:50 PM   Result Value Ref Range    Pregnancy test,urine (POC) NEGATIVE  NEG         Radiologic Studies -   MRI BRAIN WO CONT   Final Result      XR CHEST PORT   Final Result      CT HEAD WO CONT   Final Result        CT Results  (Last 48 hours)               12/30/17 1536  CT HEAD WO CONT Final result    Impression:  IMPRESSION:       No acute intracranial abnormalities. Narrative:  EXAM: CT head       INDICATION: Stroke. Hypertension. Diabetes. .       COMPARISON: None. TECHNIQUE: Axial CT imaging of the head was performed without intravenous   contrast.Dose reduction techniques used:  Automated exposure control, adjustment   of the mAs and/or kVp according to patient's size, and iterative reconstruction   techniques. _______________       FINDINGS:       BRAIN AND POSTERIOR FOSSA: The sulci, folia, ventricles and basal cisterns are   within normal limits for the patient?s age. There is no intracranial hemorrhage,   mass effect, or midline shift. There are no areas of abnormal parenchymal   attenuation. EXTRA-AXIAL SPACES AND MENINGES: There are no abnormal extra-axial fluid   collections. CALVARIUM: Intact. SINUSES: Clear. OTHER: None.       _______________               CXR Results  (Last 48 hours)               12/30/17 1531  XR CHEST PORT Final result    Impression:  Impression:       No acute cardiopulmonary disease. Narrative:  History: Left arm pain for 2 days   Comparison: 4/11/2017. Exam performed AP portable at 1518. Findings: The lungs appear clear. The cardiac silhouette and pulmonary   vascularity appear within normal limits. No evidence for pneumothorax or pleural   effusion. Medications given in the ED-  Medications   aspirin (ASPIRIN) tablet 325 mg (325 mg Oral Given 12/30/17 1602)   ketorolac (TORADOL) injection 15 mg (15 mg IntraVENous Given 12/30/17 1604)   metoclopramide HCl (REGLAN) injection 10 mg (10 mg IntraVENous Given 12/30/17 1604)   sodium chloride 0.9 % bolus infusion 1,000 mL (1,000 mL IntraVENous New Bag 12/30/17 1602)         Medical Decision Making   I am the first provider for this patient. I reviewed the vital signs, available nursing notes, past medical history, past surgical history, family history and social history. Vital Signs-Reviewed the patient's vital signs. Pulse Oximetry Analysis - 100% on RA     Cardiac Monitor:  Rate: 68 bpm  Rhythm: NSR    EKG interpretation: (Preliminary)  2:51 PM   NSR at 68 bpm. Nonspecific T wave abnormality. No acute ischemia.    EKG read by Hugo Osuna MD at 3:00 PM     Records Reviewed: Nursing Notes    Provider Notes (Medical Decision Making): INITIAL CLINICAL IMPRESSION and PLANS:  The patient presents with the primary complaint(s) of: left-sided pain. The presentation, to include historical aspects and clinical findings are consistent with the DX of complex migraine. However, other possible DX's to consider as primary, associated with, or exacerbated by include:    1. Stroke  2. TIA  3. ACS  4.  HA  5. Viral syndrome    Considering the above, my initial management plan to evaluate and therapeutic interventions include the following and as noted in the orders:    1. Labs: CBC, HCG urine, CMP, PTT, Prothrombin time + INR, cardiac panel, UA, urine microscopic  2.  Imaging: MRI brain, CT head w/o contrast, CXR      Procedures:  Procedures    ED Course:   2:59 PM   Initial assessment performed. The patients presenting problems have been discussed, and they are in agreement with the care plan formulated and outlined with them. I have encouraged them to ask questions as they arise throughout their visit. 4:40 PM  Reassessed pt. Headache resolved, muscle strength equal: +5/5 in bilateral arms and legs. CONSULT NOTE:   4:52 PM  Aster Dave MD spoke with Demetrius Yeh MD   Specialty: Neurology  Discussed pt's hx, disposition, and available diagnostic and imaging results  over the telephone. Reviewed care plans. Consulting physician agrees with plans as outlined. MRI brain without contrast.        Diagnosis and Disposition       DISCHARGE NOTE:  6:33 PM  Penny Lennon's  results have been reviewed with her. She has been counseled regarding her diagnosis, treatment, and plan. She verbally conveys understanding and agreement of the signs, symptoms, diagnosis, treatment and prognosis and additionally agrees to follow up as discussed. She also agrees with the care-plan and conveys that all of her questions have been answered.   I have also provided discharge instructions for her that include: educational information regarding their diagnosis and treatment, and list of reasons why they would want to return to the ED prior to their follow-up appointment, should her condition change. She has been provided with education for proper emergency department utilization. CLINICAL IMPRESSION:    1. Acute nonintractable headache, unspecified headache type    2. Acute cystitis with hematuria    3. Weakness of left arm    4. Weakness of left leg        PLAN:  1. D/C Home  2. Current Discharge Medication List      START taking these medications    Details   trimethoprim-sulfamethoxazole (BACTRIM DS) 160-800 mg per tablet Take 1 Tab by mouth two (2) times a day for 10 days. Qty: 20 Tab, Refills: 0         CONTINUE these medications which have NOT CHANGED    Details   ATORVASTATIN CALCIUM (LIPITOR PO) Take  by mouth nightly. albuterol (VENTOLIN HFA) 90 mcg/actuation inhaler Take 2 Puffs by inhalation every six (6) hours as needed for Wheezing. Qty: 1 Inhaler, Refills: 0      metFORMIN (GLUCOPHAGE) 850 mg tablet Take 1 Tab by mouth two (2) times daily (with meals). Qty: 60 Tab, Refills: 0      Diabetic Supplies, Miscellan. kit 1 Each by Does Not Apply route daily. Qty: 1 Kit, Refills: 0      multivitamin, tx-iron-ca-min (THERA-M W/ IRON) 9 mg iron-400 mcg tab tablet Take 1 Tab by mouth daily. Formulary substitution for DAILY MULTIVITAMIN      carisoprodol (SOMA) 350 mg tablet Take 1 Tab by mouth four (4) times daily. Max Daily Amount: 1,400 mg. Qty: 20 Tab, Refills: 0           3. Follow-up Information     Follow up With Details Comments Contact Info    Rosa Rodriguez MD Schedule an appointment as soon as possible for a visit in 2 days Follow up with Neurology  [address] [facility]  [address]      [facility] Go to As needed, If symptoms worsen [address] [place] [id number] [phone]        _______________________________    Attestations:   This note is prepared by Bib Sanchez acting as Scribe for Emilie Araiza MD.    Emilie Araiza MD:  The scribe's documentation has been prepared under my direction and personally reviewed by me in its entirety.   I confirm that the note above accurately reflects all work, treatment, procedures, and medical decision making performed by me.  _______________________________

## 2017-12-30 NOTE — ED TRIAGE NOTES
Pt c/o lt arm pain onset yest. Denies injury, pt states FSBS 247, pt states went to work today, went home took a nap and woke up with continued lt arm pain, headache and lt leg pain. Pt states a little weakness on lt side. Denies chest pain   Sepsis Screening completed    (  )Patient meets SIRS criteria. (  x)Patient does not meet SIRS criteria.       SIRS Criteria is achieved when two or more of the following are present   Temperature < 96.8°F (36°C) or > 100.9°F (38.3°C)   Heart Rate > 90 beats per minute   Respiratory Rate > 20 breaths per minute   WBC count > 12,000 or <4,000 or > 10% bands

## 2017-12-30 NOTE — DISCHARGE INSTRUCTIONS

## 2017-12-31 LAB
ATRIAL RATE: 68 BPM
CALCULATED P AXIS, ECG09: 25 DEGREES
CALCULATED R AXIS, ECG10: 12 DEGREES
CALCULATED T AXIS, ECG11: 12 DEGREES
DIAGNOSIS, 93000: NORMAL
P-R INTERVAL, ECG05: 164 MS
Q-T INTERVAL, ECG07: 412 MS
QRS DURATION, ECG06: 100 MS
QTC CALCULATION (BEZET), ECG08: 438 MS
VENTRICULAR RATE, ECG03: 68 BPM

## 2018-03-15 ENCOUNTER — HOSPITAL ENCOUNTER (EMERGENCY)
Age: 48
Discharge: HOME | End: 2018-03-15
Attending: EMERGENCY MEDICINE
Payer: COMMERCIAL

## 2018-03-15 VITALS
WEIGHT: 197 LBS | SYSTOLIC BLOOD PRESSURE: 135 MMHG | RESPIRATION RATE: 18 BRPM | BODY MASS INDEX: 38.68 KG/M2 | DIASTOLIC BLOOD PRESSURE: 93 MMHG | HEART RATE: 73 BPM | HEIGHT: 60 IN | TEMPERATURE: 97.8 F | OXYGEN SATURATION: 100 %

## 2018-03-15 DIAGNOSIS — A59.01 TRICHOMONAL VAGINITIS: ICD-10-CM

## 2018-03-15 DIAGNOSIS — R73.9 HYPERGLYCEMIA: Primary | ICD-10-CM

## 2018-03-15 LAB
ALBUMIN SERPL-MCNC: 3.4 G/DL (ref 3.4–5)
ALBUMIN/GLOB SERPL: 0.7 {RATIO} (ref 0.8–1.7)
ALP SERPL-CCNC: 99 U/L (ref 45–117)
ALT SERPL-CCNC: 114 U/L (ref 13–56)
ANION GAP SERPL CALC-SCNC: 9 MMOL/L (ref 3–18)
APPEARANCE UR: CLEAR
AST SERPL-CCNC: 84 U/L (ref 15–37)
BACTERIA URNS QL MICRO: ABNORMAL /HPF
BASOPHILS # BLD: 0 K/UL (ref 0–0.06)
BASOPHILS NFR BLD: 0 % (ref 0–2)
BILIRUB SERPL-MCNC: 0.8 MG/DL (ref 0.2–1)
BILIRUB UR QL: NEGATIVE
BUN SERPL-MCNC: 14 MG/DL (ref 7–18)
BUN/CREAT SERPL: 16 (ref 12–20)
CALCIUM SERPL-MCNC: 9.5 MG/DL (ref 8.5–10.1)
CHLORIDE SERPL-SCNC: 98 MMOL/L (ref 100–108)
CO2 SERPL-SCNC: 28 MMOL/L (ref 21–32)
COLOR UR: YELLOW
CREAT SERPL-MCNC: 0.88 MG/DL (ref 0.6–1.3)
DIFFERENTIAL METHOD BLD: ABNORMAL
EOSINOPHIL # BLD: 0.2 K/UL (ref 0–0.4)
EOSINOPHIL NFR BLD: 2 % (ref 0–5)
EPITH CASTS URNS QL MICRO: ABNORMAL /LPF (ref 0–5)
ERYTHROCYTE [DISTWIDTH] IN BLOOD BY AUTOMATED COUNT: 13.3 % (ref 11.6–14.5)
GLOBULIN SER CALC-MCNC: 5 G/DL (ref 2–4)
GLUCOSE BLD STRIP.AUTO-MCNC: 294 MG/DL (ref 70–110)
GLUCOSE BLD STRIP.AUTO-MCNC: 312 MG/DL (ref 70–110)
GLUCOSE BLD STRIP.AUTO-MCNC: 316 MG/DL (ref 70–110)
GLUCOSE SERPL-MCNC: 346 MG/DL (ref 74–99)
GLUCOSE UR STRIP.AUTO-MCNC: >1000 MG/DL
HCG UR QL: NEGATIVE
HCT VFR BLD AUTO: 37.6 % (ref 35–45)
HGB BLD-MCNC: 12.5 G/DL (ref 12–16)
HGB UR QL STRIP: ABNORMAL
KETONES UR QL STRIP.AUTO: NEGATIVE MG/DL
LEUKOCYTE ESTERASE UR QL STRIP.AUTO: NEGATIVE
LYMPHOCYTES # BLD: 3.9 K/UL (ref 0.9–3.6)
LYMPHOCYTES NFR BLD: 46 % (ref 21–52)
MCH RBC QN AUTO: 27.9 PG (ref 24–34)
MCHC RBC AUTO-ENTMCNC: 33.2 G/DL (ref 31–37)
MCV RBC AUTO: 83.9 FL (ref 74–97)
MONOCYTES # BLD: 0.5 K/UL (ref 0.05–1.2)
MONOCYTES NFR BLD: 5 % (ref 3–10)
NEUTS SEG # BLD: 4 K/UL (ref 1.8–8)
NEUTS SEG NFR BLD: 47 % (ref 40–73)
NITRITE UR QL STRIP.AUTO: NEGATIVE
PH UR STRIP: 6.5 [PH] (ref 5–8)
PLATELET # BLD AUTO: 257 K/UL (ref 135–420)
PMV BLD AUTO: 11.3 FL (ref 9.2–11.8)
POTASSIUM SERPL-SCNC: 4.2 MMOL/L (ref 3.5–5.5)
PROT SERPL-MCNC: 8.4 G/DL (ref 6.4–8.2)
PROT UR STRIP-MCNC: NEGATIVE MG/DL
RBC # BLD AUTO: 4.48 M/UL (ref 4.2–5.3)
RBC #/AREA URNS HPF: ABNORMAL /HPF (ref 0–5)
SODIUM SERPL-SCNC: 135 MMOL/L (ref 136–145)
SP GR UR REFRACTOMETRY: 1.01 (ref 1–1.03)
TRICHOMONAS UR QL MICRO: ABNORMAL
UROBILINOGEN UR QL STRIP.AUTO: 0.2 EU/DL (ref 0.2–1)
WBC # BLD AUTO: 8.5 K/UL (ref 4.6–13.2)
WBC URNS QL MICRO: ABNORMAL /HPF (ref 0–5)

## 2018-03-15 PROCEDURE — 99284 EMERGENCY DEPT VISIT MOD MDM: CPT

## 2018-03-15 PROCEDURE — 87491 CHLMYD TRACH DNA AMP PROBE: CPT | Performed by: PHYSICIAN ASSISTANT

## 2018-03-15 PROCEDURE — 81001 URINALYSIS AUTO W/SCOPE: CPT | Performed by: PHYSICIAN ASSISTANT

## 2018-03-15 PROCEDURE — 85025 COMPLETE CBC W/AUTO DIFF WBC: CPT | Performed by: PHYSICIAN ASSISTANT

## 2018-03-15 PROCEDURE — 82962 GLUCOSE BLOOD TEST: CPT

## 2018-03-15 PROCEDURE — 80053 COMPREHEN METABOLIC PANEL: CPT | Performed by: PHYSICIAN ASSISTANT

## 2018-03-15 PROCEDURE — 74011250637 HC RX REV CODE- 250/637: Performed by: PHYSICIAN ASSISTANT

## 2018-03-15 PROCEDURE — 96374 THER/PROPH/DIAG INJ IV PUSH: CPT

## 2018-03-15 PROCEDURE — 74011250636 HC RX REV CODE- 250/636: Performed by: PHYSICIAN ASSISTANT

## 2018-03-15 PROCEDURE — 96361 HYDRATE IV INFUSION ADD-ON: CPT

## 2018-03-15 PROCEDURE — 81025 URINE PREGNANCY TEST: CPT

## 2018-03-15 RX ORDER — ONDANSETRON 2 MG/ML
4 INJECTION INTRAMUSCULAR; INTRAVENOUS
Status: COMPLETED | OUTPATIENT
Start: 2018-03-15 | End: 2018-03-15

## 2018-03-15 RX ORDER — METRONIDAZOLE 250 MG/1
2000 TABLET ORAL
Status: COMPLETED | OUTPATIENT
Start: 2018-03-15 | End: 2018-03-15

## 2018-03-15 RX ADMIN — ONDANSETRON 4 MG: 2 INJECTION INTRAMUSCULAR; INTRAVENOUS at 16:40

## 2018-03-15 RX ADMIN — SODIUM CHLORIDE 1000 ML: 900 INJECTION, SOLUTION INTRAVENOUS at 15:00

## 2018-03-15 RX ADMIN — SODIUM CHLORIDE 1000 ML: 900 INJECTION, SOLUTION INTRAVENOUS at 14:12

## 2018-03-15 RX ADMIN — METRONIDAZOLE 2000 MG: 250 TABLET ORAL at 16:40

## 2018-03-15 NOTE — ED PROVIDER NOTES
EMERGENCY DEPARTMENT HISTORY AND PHYSICAL EXAM    Date: 3/15/2018  Patient Name: Linda Hannah    History of Presenting Illness     Chief Complaint   Patient presents with    High Blood Sugar         History Provided By: Patient    Chief Complaint: elevated blood sugar  Duration: 4 Days  Timing:  Gradual  Quality: Aching  Severity: Moderate  Modifying Factors: Hx of DM  Associated Symptoms: body aches, low grade fever and diarrhea    Additional History (Context):   2:00 PM  Linda Hannah is a 41 y.o. female with PMHX of DM who presents to the emergency department C/O blood sugar of 540 4 days ago. Pt has been taking her metformin as prescribed. Associated sx's include generalized body aches, low grade fever and diarrhea. Pt denies diet changes, vomiting, dysuria, CP, SOB, and any other sxs or complaints. PCP: Charly Meza MD    Current Facility-Administered Medications   Medication Dose Route Frequency Provider Last Rate Last Dose    metroNIDAZOLE (FLAGYL) tablet 2,000 mg  2,000 mg Oral NOW MARYLU Jerez        ondansetron (ZOFRAN) injection 4 mg  4 mg IntraVENous NOW MARYLU Katz         Current Outpatient Prescriptions   Medication Sig Dispense Refill    ATORVASTATIN CALCIUM (LIPITOR PO) Take  by mouth nightly.  albuterol (VENTOLIN HFA) 90 mcg/actuation inhaler Take 2 Puffs by inhalation every six (6) hours as needed for Wheezing. 1 Inhaler 0    metFORMIN (GLUCOPHAGE) 850 mg tablet Take 1 Tab by mouth two (2) times daily (with meals). 60 Tab 0    Diabetic Supplies, Miscellan. kit 1 Each by Does Not Apply route daily. 1 Kit 0    multivitamin, tx-iron-ca-min (THERA-M W/ IRON) 9 mg iron-400 mcg tab tablet Take 1 Tab by mouth daily. Formulary substitution for DAILY MULTIVITAMIN         Past History     Past Medical History:  Past Medical History:   Diagnosis Date    Asthma     Diabetes ([address]     High cholesterol     Hypertension        Past Surgical History:  History reviewed.  No pertinent surgical history. Family History:  History reviewed. No pertinent family history. Social History:  Social History   Substance Use Topics    Smoking status: Former Smoker     Quit date: 4/11/2015    Smokeless tobacco: Never Used    Alcohol use No       Allergies:  No Known Allergies      Review of Systems   Review of Systems   Constitutional: Positive for fever. Respiratory: Negative for shortness of breath. Cardiovascular: Negative for chest pain. Gastrointestinal: Positive for diarrhea. Negative for vomiting. Genitourinary: Negative for dysuria. Musculoskeletal: Positive for myalgias. All other systems reviewed and are negative. Physical Exam     Vitals:    03/15/18 1346   BP: (!) 135/93   Pulse: 73   Resp: 18   Temp: 97.8 °F (36.6 °C)   SpO2: 100%   Weight: 89.4 kg (197 lb)   Height: 5' (1.524 m)     Physical Exam   Constitutional: She is oriented to person, place, and time. She appears well-developed and well-nourished. No distress. HENT:   Head: Normocephalic and atraumatic. Right Ear: External ear normal.   Left Ear: External ear normal.   Nose: Nose normal.   Mouth/Throat: Oropharynx is clear and moist.   Eyes: Conjunctivae and EOM are normal. Pupils are equal, round, and reactive to light. Neck: Normal range of motion. Neck supple. Cardiovascular: Normal rate and regular rhythm. Pulmonary/Chest: Effort normal and breath sounds normal.   Abdominal: Soft. Bowel sounds are normal. She exhibits no distension. There is no tenderness. There is no rebound and no guarding. Musculoskeletal: Normal range of motion. She exhibits no edema or tenderness. Neurological: She is alert and oriented to person, place, and time. Skin: Skin is warm and dry. Psychiatric: She has a normal mood and affect. Her behavior is normal.   Nursing note and vitals reviewed.         Diagnostic Study Results     Labs -     Recent Results (from the past 12 hour(s))   GLUCOSE, POC Collection Time: 03/15/18  1:48 PM   Result Value Ref Range    Glucose (POC) 312 (H) 70 - 110 mg/dL   CBC WITH AUTOMATED DIFF    Collection Time: 03/15/18  2:05 PM   Result Value Ref Range    WBC 8.5 4.6 - 13.2 K/uL    RBC 4.48 4.20 - 5.30 M/uL    HGB 12.5 12.0 - 16.0 g/dL    HCT 37.6 35.0 - 45.0 %    MCV 83.9 74.0 - 97.0 FL    MCH 27.9 24.0 - 34.0 PG    MCHC 33.2 31.0 - 37.0 g/dL    RDW 13.3 11.6 - 14.5 %    PLATELET 499 680 - 455 K/uL    MPV 11.3 9.2 - 11.8 FL    NEUTROPHILS 47 40 - 73 %    LYMPHOCYTES 46 21 - 52 %    MONOCYTES 5 3 - 10 %    EOSINOPHILS 2 0 - 5 %    BASOPHILS 0 0 - 2 %    ABS. NEUTROPHILS 4.0 1.8 - 8.0 K/UL    ABS. LYMPHOCYTES 3.9 (H) 0.9 - 3.6 K/UL    ABS. MONOCYTES 0.5 0.05 - 1.2 K/UL    ABS. EOSINOPHILS 0.2 0.0 - 0.4 K/UL    ABS. BASOPHILS 0.0 0.0 - 0.06 K/UL    DF AUTOMATED     METABOLIC PANEL, COMPREHENSIVE    Collection Time: 03/15/18  2:05 PM   Result Value Ref Range    Sodium 135 (L) 136 - 145 mmol/L    Potassium 4.2 3.5 - 5.5 mmol/L    Chloride 98 (L) 100 - 108 mmol/L    CO2 28 21 - 32 mmol/L    Anion gap 9 3.0 - 18 mmol/L    Glucose 346 (H) 74 - 99 mg/dL    BUN 14 7.0 - 18 MG/DL    Creatinine 0.88 0.6 - 1.3 MG/DL    BUN/Creatinine ratio 16 12 - 20      GFR est AA >60 >60 ml/min/1.73m2    GFR est non-AA >60 >60 ml/min/1.73m2    Calcium 9.5 8.5 - 10.1 MG/DL    Bilirubin, total 0.8 0.2 - 1.0 MG/DL    ALT (SGPT) 114 (H) 13 - 56 U/L    AST (SGOT) 84 (H) 15 - 37 U/L    Alk.  phosphatase 99 45 - 117 U/L    Protein, total 8.4 (H) 6.4 - 8.2 g/dL    Albumin 3.4 3.4 - 5.0 g/dL    Globulin 5.0 (H) 2.0 - 4.0 g/dL    A-G Ratio 0.7 (L) 0.8 - 1.7     GLUCOSE, POC    Collection Time: 03/15/18  2:55 PM   Result Value Ref Range    Glucose (POC) 316 (H) 70 - 110 mg/dL   URINALYSIS W/ RFLX MICROSCOPIC    Collection Time: 03/15/18  3:45 PM   Result Value Ref Range    Color YELLOW      Appearance CLEAR      Specific gravity 1.012 1.005 - 1.030      pH (UA) 6.5 5.0 - 8.0      Protein NEGATIVE  NEG mg/dL Glucose >1000 (A) NEG mg/dL    Ketone NEGATIVE  NEG mg/dL    Bilirubin NEGATIVE  NEG      Blood MODERATE (A) NEG      Urobilinogen 0.2 0.2 - 1.0 EU/dL    Nitrites NEGATIVE  NEG      Leukocyte Esterase NEGATIVE  NEG     URINE MICROSCOPIC ONLY    Collection Time: 03/15/18  3:45 PM   Result Value Ref Range    WBC 0 to 3 0 - 5 /hpf    RBC 0 to 3 0 - 5 /hpf    Epithelial cells FEW 0 - 5 /lpf    Bacteria FEW (A) NEG /hpf    Trichomonas FEW (A) NEG     HCG URINE, QL. - POC    Collection Time: 03/15/18  3:53 PM   Result Value Ref Range    Pregnancy test,urine (POC) NEGATIVE  NEG     GLUCOSE, POC    Collection Time: 03/15/18  4:20 PM   Result Value Ref Range    Glucose (POC) 294 (H) 70 - 110 mg/dL       Radiologic Studies -   No orders to display     CT Results  (Last 48 hours)    None        CXR Results  (Last 48 hours)    None          Medications given in the ED-  Medications   metroNIDAZOLE (FLAGYL) tablet 2,000 mg (not administered)   ondansetron (ZOFRAN) injection 4 mg (not administered)   sodium chloride 0.9 % bolus infusion 1,000 mL (0 mL IntraVENous IV Completed 3/15/18 1500)   sodium chloride 0.9 % bolus infusion 1,000 mL (1,000 mL IntraVENous New Bag 3/15/18 1500)         Medical Decision Making   I am the first provider for this patient. I reviewed the vital signs, available nursing notes, past medical history, past surgical history, family history and social history. Vital Signs-Reviewed the patient's vital signs. Procedures:  Procedures    ED Course:   2:00 PM Initial assessment performed. The patients presenting problems have been discussed, and they are in agreement with the care plan formulated and outlined with them. I have encouraged them to ask questions as they arise throughout their visit.    4:22 PM Long discussion with pt regarding labs and trichamonus. Pt denies new sex partner or concern for STD. She states she is not having any pelvic pain, vaginal discharge or dysuria.  Pt will see PCP in follow up for suggestion of better glycemic control possibly needing medication changes. Diagnosis and Disposition       DISCHARGE NOTE:  4:24 PM  Samuel Lennon's  results have been reviewed with her. She has been counseled regarding her diagnosis, treatment, and plan. She verbally conveys understanding and agreement of the signs, symptoms, diagnosis, treatment and prognosis and additionally agrees to follow up as discussed. She also agrees with the care-plan and conveys that all of her questions have been answered. I have also provided discharge instructions for her that include: educational information regarding their diagnosis and treatment, and list of reasons why they would want to return to the ED prior to their follow-up appointment, should her condition change. She has been provided with education for proper emergency department utilization. CLINICAL IMPRESSION:    1. Hyperglycemia    2. Trichomonal vaginitis        PLAN:  1. D/C Home  2. Current Discharge Medication List        3. Follow-up Information     Follow up With Details Comments Contact Info    Bibiana Gomez MD Schedule an appointment as soon as possible for a visit in 2 days  [address] [place] [address]      [facility] EMERGENCY DEPT  As needed, If symptoms worsen [address] [place] [id number]  [phone]        _______________________________    Attestations: This note is prepared by Piyush Beebe , acting as Scribe for Camacho Orlando PA-C. Camacho Orlando PA-C:  The scribe's documentation has been prepared under my direction and personally reviewed by me in its entirety.   I confirm that the note above accurately reflects all work, treatment, procedures, and medical decision making performed by me.  _______________________________

## 2018-03-15 NOTE — ED TRIAGE NOTES
Pt states checked blood sugar today, read 545 onset 3 days ago,   Sepsis Screening completed    (  )Patient meets SIRS criteria. (  x)Patient does not meet SIRS criteria.       SIRS Criteria is achieved when two or more of the following are present   Temperature < 96.8°F (36°C) or > 100.9°F (38.3°C)   Heart Rate > 90 beats per minute   Respiratory Rate > 20 breaths per minute   WBC count > 12,000 or <4,000 or > 10% bands

## 2018-03-15 NOTE — DISCHARGE INSTRUCTIONS
Trichomoniasis: Care Instructions  Your Care Instructions  Trichomoniasis is a sexually transmitted infection (STI) that is spread by having sex with an infected partner. Trichomoniasis is commonly called trich (say \"trick\"). In women, trich may cause vaginal itching and a smelly discharge. But in many cases, especially in men, there are no symptoms. Maribel Foreman is treated so that you do not spread it to others. Both you and your sex partner or partners should be treated at the same time so you do not infect each other again. Trich may cause problems with pregnancy. Your doctor will talk with you about treatment for Trich if you are pregnant. Follow-up care is a key part of your treatment and safety. Be sure to make and go to all appointments, and call your doctor if you are having problems. It's also a good idea to know your test results and keep a list of the medicines you take. How can you care for yourself at home? · Take your antibiotics as directed. Do not stop taking them just because you feel better. You need to take the full course of antibiotics. · Do not have sex while you are being treated. If your doctor gave you a single dose of antibiotics, do not have sex for one week after being treated and until your partner also has been treated. · Tell your sex partner (or partners) that he or she will also need to be tested and treated. · Use a cold water compress or cool baths to relieve itching. To prevent trichomoniasis in the future  · Use latex condoms every time you have sex. Use them from the beginning to the end of sexual contact. · Talk to your partner before having sex. Find out if he or she has or is at risk for trich or any other STI. Keep in mind that a person may be able to spread an STI even if he or she does not have symptoms. · Do not have sex if you are being treated for trich or any other STI.   · Do not have sex with anyone who has symptoms of an STI, such as sores on the genitals or mouth. · Having one sex partner (who does not have STIs and does not have sex with anyone else) is a good way to avoid STIs. When should you call for help? Call your doctor now or seek immediate medical care if:  ? · You have unusual vaginal bleeding. ? · You have a fever. ? · You have new discharge from the vagina or penis. ? · You have pelvic pain. ? Watch closely for changes in your health, and be sure to contact your doctor if:  ? · You do not get better as expected. ? · You have any new symptoms or your symptoms get worse. Where can you learn more? Go to http://nadine-pierce.info/. Enter O424 in the search box to learn more about \"Trichomoniasis: Care Instructions. \"  Current as of: March 20, 2017  Content Version: 11.4  © 9646-9732 [employer]. Care instructions adapted under license by [employer] (which disclaims liability or warranty for this information). If you have questions about a medical condition or this instruction, always ask your healthcare professional. [address] any warranty or liability for your use of this information. Learning About High Blood Sugar  What is high blood sugar? Your body turns the food you eat into glucose (sugar), which it uses for energy. But if your body isn't able to use the sugar right away, it can build up in your blood and lead to high blood sugar. When the amount of sugar in your blood stays too high for too much of the time, you may have diabetes. Diabetes is a disease that can cause serious health problems. The good news is that lifestyle changes may help you get your blood sugar back to normal and avoid or delay diabetes. What causes high blood sugar? Sugar (glucose) can build up in your blood if you:  · Are overweight. · Have a family history of diabetes. · Take certain medicines, such as steroids. What are the symptoms?   Having high blood sugar may not cause any symptoms at all. Or it may make you feel very thirsty or very hungry. You may also urinate more often than usual, have blurry vision, or lose weight without trying. How is high blood sugar treated? You can take steps to lower your blood sugar level if you understand what makes it get higher. Your doctor may want you to learn how to test your blood sugar level at home. Then you can see how illness, stress, or different kinds of food or medicine raise or lower your blood sugar level. Other tests may be needed to see if you have diabetes. How can you prevent high blood sugar? · Watch your weight. If you're overweight, losing just a small amount of weight may help. Reducing fat around your waist is most important. · Limit the amount of calories, sweets, and unhealthy fat you eat. Ask your doctor if a dietitian can help you. A registered dietitian can help you create meal plans that fit your lifestyle. · Get at least 30 minutes of exercise on most days of the week. Exercise helps control your blood sugar. It also helps you maintain a healthy weight. Walking is a good choice. You also may want to do other activities, such as running, swimming, cycling, or playing tennis or team sports. · If your doctor prescribed medicines, take them exactly as prescribed. Call your doctor if you think you are having a problem with your medicine. You will get more details on the specific medicines your doctor prescribes. Follow-up care is a key part of your treatment and safety. Be sure to make and go to all appointments, and call your doctor if you are having problems. It's also a good idea to know your test results and keep a list of the medicines you take. Where can you learn more? Go to http://nadine-pierce.info/. Enter O108 in the search box to learn more about \"Learning About High Blood Sugar. \"  Current as of: March 13, 2017  Content Version: 11.4  © 7363-2529 [employer], [facility].  Care instructions adapted under license by [employer] (which disclaims liability or warranty for this information). If you have questions about a medical condition or this instruction, always ask your healthcare professional. [address] any warranty or liability for your use of this information.

## 2018-03-15 NOTE — ED NOTES
Pt resting dorsal recumbent with eyes closed and arms crossed across chest.  Resp even and unlabored. IVF continues.

## 2018-03-18 LAB
C TRACH RRNA SPEC QL NAA+PROBE: NEGATIVE
N GONORRHOEA RRNA SPEC QL NAA+PROBE: NEGATIVE
SPECIMEN SOURCE: NORMAL

## 2018-08-19 ENCOUNTER — HOSPITAL ENCOUNTER (EMERGENCY)
Age: 48
Discharge: HOME | End: 2018-08-19
Attending: EMERGENCY MEDICINE
Payer: COMMERCIAL

## 2018-08-19 ENCOUNTER — APPOINTMENT (OUTPATIENT)
Dept: GENERAL RADIOLOGY | Age: 48
End: 2018-08-19
Attending: PHYSICIAN ASSISTANT
Payer: COMMERCIAL

## 2018-08-19 VITALS
WEIGHT: 179 LBS | HEIGHT: 64 IN | RESPIRATION RATE: 13 BRPM | SYSTOLIC BLOOD PRESSURE: 138 MMHG | OXYGEN SATURATION: 100 % | DIASTOLIC BLOOD PRESSURE: 87 MMHG | TEMPERATURE: 98 F | BODY MASS INDEX: 30.56 KG/M2 | HEART RATE: 69 BPM

## 2018-08-19 DIAGNOSIS — E11.9 DIABETES MELLITUS TYPE 2, NONINSULIN DEPENDENT (HCC): ICD-10-CM

## 2018-08-19 DIAGNOSIS — I10 POORLY-CONTROLLED HYPERTENSION: ICD-10-CM

## 2018-08-19 DIAGNOSIS — R07.9 CHEST PAIN, UNSPECIFIED TYPE: Primary | ICD-10-CM

## 2018-08-19 DIAGNOSIS — R73.9 HYPERGLYCEMIA: ICD-10-CM

## 2018-08-19 LAB
ALBUMIN SERPL-MCNC: 3.5 G/DL (ref 3.4–5)
ALBUMIN/GLOB SERPL: 0.7 {RATIO} (ref 0.8–1.7)
ALP SERPL-CCNC: 86 U/L (ref 45–117)
ALT SERPL-CCNC: 39 U/L (ref 13–56)
ANION GAP SERPL CALC-SCNC: 7 MMOL/L (ref 3–18)
APPEARANCE UR: CLEAR
AST SERPL-CCNC: 23 U/L (ref 15–37)
BACTERIA URNS QL MICRO: ABNORMAL /HPF
BASOPHILS # BLD: 0 K/UL (ref 0–0.1)
BASOPHILS NFR BLD: 0 % (ref 0–2)
BILIRUB SERPL-MCNC: 1 MG/DL (ref 0.2–1)
BILIRUB UR QL: NEGATIVE
BUN SERPL-MCNC: 12 MG/DL (ref 7–18)
BUN/CREAT SERPL: 12 (ref 12–20)
CALCIUM SERPL-MCNC: 9.7 MG/DL (ref 8.5–10.1)
CHLORIDE SERPL-SCNC: 102 MMOL/L (ref 100–108)
CK MB CFR SERPL CALC: 0.8 % (ref 0–4)
CK MB SERPL-MCNC: 1 NG/ML (ref 5–25)
CK SERPL-CCNC: 127 U/L (ref 26–192)
CO2 SERPL-SCNC: 31 MMOL/L (ref 21–32)
COLOR UR: YELLOW
CREAT SERPL-MCNC: 0.97 MG/DL (ref 0.6–1.3)
DIFFERENTIAL METHOD BLD: NORMAL
EOSINOPHIL # BLD: 0.1 K/UL (ref 0–0.4)
EOSINOPHIL NFR BLD: 1 % (ref 0–5)
EPITH CASTS URNS QL MICRO: ABNORMAL /LPF (ref 0–5)
ERYTHROCYTE [DISTWIDTH] IN BLOOD BY AUTOMATED COUNT: 12.8 % (ref 11.6–14.5)
GLOBULIN SER CALC-MCNC: 4.7 G/DL (ref 2–4)
GLUCOSE SERPL-MCNC: 258 MG/DL (ref 74–99)
GLUCOSE UR STRIP.AUTO-MCNC: >1000 MG/DL
HCG UR QL: NEGATIVE
HCT VFR BLD AUTO: 36.2 % (ref 35–45)
HGB BLD-MCNC: 12.1 G/DL (ref 12–16)
HGB UR QL STRIP: ABNORMAL
KETONES UR QL STRIP.AUTO: NEGATIVE MG/DL
LEUKOCYTE ESTERASE UR QL STRIP.AUTO: NEGATIVE
LYMPHOCYTES # BLD: 3.3 K/UL (ref 0.9–3.6)
LYMPHOCYTES NFR BLD: 46 % (ref 21–52)
MCH RBC QN AUTO: 28.4 PG (ref 24–34)
MCHC RBC AUTO-ENTMCNC: 33.4 G/DL (ref 31–37)
MCV RBC AUTO: 85 FL (ref 74–97)
MONOCYTES # BLD: 0.3 K/UL (ref 0.05–1.2)
MONOCYTES NFR BLD: 4 % (ref 3–10)
NEUTS SEG # BLD: 3.5 K/UL (ref 1.8–8)
NEUTS SEG NFR BLD: 49 % (ref 40–73)
NITRITE UR QL STRIP.AUTO: NEGATIVE
PH UR STRIP: 6 [PH] (ref 5–8)
PLATELET # BLD AUTO: 255 K/UL (ref 135–420)
PMV BLD AUTO: 11 FL (ref 9.2–11.8)
POTASSIUM SERPL-SCNC: 3.9 MMOL/L (ref 3.5–5.5)
PROT SERPL-MCNC: 8.2 G/DL (ref 6.4–8.2)
PROT UR STRIP-MCNC: NEGATIVE MG/DL
RBC # BLD AUTO: 4.26 M/UL (ref 4.2–5.3)
RBC #/AREA URNS HPF: ABNORMAL /HPF (ref 0–5)
SODIUM SERPL-SCNC: 140 MMOL/L (ref 136–145)
SP GR UR REFRACTOMETRY: 1.02 (ref 1–1.03)
TROPONIN I SERPL-MCNC: <0.02 NG/ML (ref 0–0.06)
UROBILINOGEN UR QL STRIP.AUTO: 1 EU/DL (ref 0.2–1)
WBC # BLD AUTO: 7.2 K/UL (ref 4.6–13.2)
WBC URNS QL MICRO: ABNORMAL /HPF (ref 0–5)

## 2018-08-19 PROCEDURE — 74011250636 HC RX REV CODE- 250/636: Performed by: PHYSICIAN ASSISTANT

## 2018-08-19 PROCEDURE — 96361 HYDRATE IV INFUSION ADD-ON: CPT

## 2018-08-19 PROCEDURE — 80053 COMPREHEN METABOLIC PANEL: CPT | Performed by: PHYSICIAN ASSISTANT

## 2018-08-19 PROCEDURE — 82550 ASSAY OF CK (CPK): CPT | Performed by: PHYSICIAN ASSISTANT

## 2018-08-19 PROCEDURE — 81001 URINALYSIS AUTO W/SCOPE: CPT | Performed by: PHYSICIAN ASSISTANT

## 2018-08-19 PROCEDURE — 85025 COMPLETE CBC W/AUTO DIFF WBC: CPT | Performed by: PHYSICIAN ASSISTANT

## 2018-08-19 PROCEDURE — 71046 X-RAY EXAM CHEST 2 VIEWS: CPT

## 2018-08-19 PROCEDURE — 96374 THER/PROPH/DIAG INJ IV PUSH: CPT

## 2018-08-19 PROCEDURE — 81025 URINE PREGNANCY TEST: CPT

## 2018-08-19 PROCEDURE — 99285 EMERGENCY DEPT VISIT HI MDM: CPT

## 2018-08-19 PROCEDURE — 93005 ELECTROCARDIOGRAM TRACING: CPT

## 2018-08-19 RX ORDER — KETOROLAC TROMETHAMINE 30 MG/ML
30 INJECTION, SOLUTION INTRAMUSCULAR; INTRAVENOUS
Status: COMPLETED | OUTPATIENT
Start: 2018-08-19 | End: 2018-08-19

## 2018-08-19 RX ORDER — PRAVASTATIN SODIUM 10 MG/1
TABLET ORAL
COMMUNITY
End: 2022-10-21

## 2018-08-19 RX ADMIN — KETOROLAC TROMETHAMINE 30 MG: 30 INJECTION, SOLUTION INTRAMUSCULAR at 19:56

## 2018-08-19 RX ADMIN — SODIUM CHLORIDE 500 ML: 900 INJECTION, SOLUTION INTRAVENOUS at 18:41

## 2018-08-19 NOTE — LETTER
[facility] [place] 
THE [facility] EMERGENCY DEPT 
[address] [address] 
[phone] Work/School Note Date: 8/19/2018 To Whom It May concern: 
 
Greg Garcia was seen and treated today in the emergency room by the following provider(s): 
Attending Provider: Hardik Ball DO Physician Assistant: MARYLU Henderson. Greg Garcia may return to work on 8/22/2018.  
 
Sincerely, 
 
 
 
 
Ilia Avila PA-C

## 2018-08-19 NOTE — ED TRIAGE NOTES
Pt arrives ambulatory to ED with c\o midsternal CP that radiates to left arm that started yesterday, pt denies SOB, dizziness, n/v/d, pt is able to make needs known speaking in complete sentences, pt in nad at this time

## 2018-08-19 NOTE — ED NOTES
Assumed care of pt from [facility]. Told pt presents to ED with mid sternal CP since yesterday. Pt currently denies any SOB/NV.  ml bolus complete. Pt ad luana to the restroom in attempt to provide urine specimen for labs.

## 2018-08-19 NOTE — ED PROVIDER NOTES
EMERGENCY DEPARTMENT HISTORY AND PHYSICAL EXAM    Date: 8/19/2018  Patient Name: Brady Hill    History of Presenting Illness     Chief Complaint   Patient presents with    Chest Pain         History Provided By: Patient    Chief Complaint: Chest Pain  Duration: 1 day  Timing:  Intermittent  Location: central chest pain, which radiates to the left  Quality: Sharp and Tightness  Severity: 7 out of 10  Modifying Factors: Ibuprofen taken to no relief, no change in severity with activity  Associated Symptoms: Tingling in left chest, LUQ abdominal pain    Additional History (Context):     6:04 PM    Brady Hill is a 41 y.o. female with PMHX of asthma, HTN, HLD, DM who presents ambulatory to the emergency department C/O intermittent tight and sharp central chest pain (7/10) radiating towards her left chest and left armpit x ~5PM yesterday. Associated sxs include tingling in her left chest and LUQ abdominal pain. Pt denies N/V/D, skin changes, dizziness, SOB, any recent illnesses, any heavy lifting, exacerbation with activity, and any other sxs or complaints. Pt states taking Ibuprofen to no relief. Pt states that she regularly takes ASA due to her HTN, HLD, and DM medications. Pt reports her blood sugars have been \"up and down\", stating they have been in the 200s. Pts last stress test was ~4 years ago after an abnormal EKG, but no abnormalities except a murmur were found. PCP: Luan Bland MD    Current Outpatient Prescriptions   Medication Sig Dispense Refill    pravastatin (PRAVACHOL) 10 mg tablet Take  by mouth nightly.  SITagliptin (JANUVIA) 25 mg tablet Take 25 mg by mouth daily.  albuterol (VENTOLIN HFA) 90 mcg/actuation inhaler Take 2 Puffs by inhalation every six (6) hours as needed for Wheezing. 1 Inhaler 0    metFORMIN (GLUCOPHAGE) 850 mg tablet Take 1 Tab by mouth two (2) times daily (with meals). 60 Tab 0    Diabetic Supplies, Miscellan. kit 1 Each by Does Not Apply route daily.  1 Kit 0  multivitamin, tx-iron-ca-min (THERA-M W/ IRON) 9 mg iron-400 mcg tab tablet Take 1 Tab by mouth daily. Formulary substitution for DAILY MULTIVITAMIN         Past History     Past Medical History:  Past Medical History:   Diagnosis Date    Asthma     Diabetes ([address]     High cholesterol     Hypertension        Past Surgical History:  History reviewed. No pertinent surgical history. Family History:  History reviewed. No pertinent family history. Social History:  Social History   Substance Use Topics    Smoking status: Former Smoker     Quit date: 4/11/2015    Smokeless tobacco: Never Used    Alcohol use No       Allergies:  No Known Allergies      Review of Systems   Review of Systems   Respiratory: Negative for shortness of breath. Cardiovascular: Positive for chest pain (central). Gastrointestinal: Positive for abdominal pain (LUQ). Negative for diarrhea, nausea and vomiting. Skin: Negative for color change and rash. Neurological: Negative for dizziness. (+) tingling on left side of chest   All other systems reviewed and are negative. Physical Exam     Vitals:    08/19/18 2005 08/19/18 2010 08/19/18 2015 08/19/18 2025   BP: 129/88 126/89 128/88 136/87   Pulse: 67 75 64 63   Resp: 18 13 17 16   Temp:       SpO2: 96% 96% 97% 100%   Weight:       Height:         Physical Exam   Constitutional: She is oriented to person, place, and time. She appears well-developed and well-nourished. No distress. HENT:   Head: Normocephalic and atraumatic. Eyes: Conjunctivae and EOM are normal. Pupils are equal, round, and reactive to light. Neck: Normal range of motion. Neck supple. Cardiovascular: Normal rate and regular rhythm. Pulmonary/Chest: Effort normal and breath sounds normal. She exhibits tenderness. Abdominal: Soft. Bowel sounds are normal.   Musculoskeletal: Normal range of motion. She exhibits no edema or tenderness.    Neurological: She is alert and oriented to person, place, and time. Skin: Skin is warm and dry. Psychiatric: She has a normal mood and affect. Her behavior is normal.   Nursing note and vitals reviewed. Diagnostic Study Results     Labs -     Recent Results (from the past 12 hour(s))   EKG, 12 LEAD, INITIAL    Collection Time: 08/19/18  5:56 PM   Result Value Ref Range    Ventricular Rate 60 BPM    Atrial Rate 60 BPM    P-R Interval 170 ms    QRS Duration 100 ms    Q-T Interval 384 ms    QTC Calculation (Bezet) 384 ms    Calculated P Axis 39 degrees    Calculated R Axis 24 degrees    Calculated T Axis 29 degrees    Diagnosis       Normal sinus rhythm  Normal ECG  When compared with ECG of 30-DEC-2017 15:00,  Nonspecific T wave abnormality no longer evident in Anterior leads     CBC WITH AUTOMATED DIFF    Collection Time: 08/19/18  6:30 PM   Result Value Ref Range    WBC 7.2 4.6 - 13.2 K/uL    RBC 4.26 4.20 - 5.30 M/uL    HGB 12.1 12.0 - 16.0 g/dL    HCT 36.2 35.0 - 45.0 %    MCV 85.0 74.0 - 97.0 FL    MCH 28.4 24.0 - 34.0 PG    MCHC 33.4 31.0 - 37.0 g/dL    RDW 12.8 11.6 - 14.5 %    PLATELET 065 069 - 330 K/uL    MPV 11.0 9.2 - 11.8 FL    NEUTROPHILS 49 40 - 73 %    LYMPHOCYTES 46 21 - 52 %    MONOCYTES 4 3 - 10 %    EOSINOPHILS 1 0 - 5 %    BASOPHILS 0 0 - 2 %    ABS. NEUTROPHILS 3.5 1.8 - 8.0 K/UL    ABS. LYMPHOCYTES 3.3 0.9 - 3.6 K/UL    ABS. MONOCYTES 0.3 0.05 - 1.2 K/UL    ABS. EOSINOPHILS 0.1 0.0 - 0.4 K/UL    ABS.  BASOPHILS 0.0 0.0 - 0.1 K/UL    DF AUTOMATED     METABOLIC PANEL, COMPREHENSIVE    Collection Time: 08/19/18  6:30 PM   Result Value Ref Range    Sodium 140 136 - 145 mmol/L    Potassium 3.9 3.5 - 5.5 mmol/L    Chloride 102 100 - 108 mmol/L    CO2 31 21 - 32 mmol/L    Anion gap 7 3.0 - 18 mmol/L    Glucose 258 (H) 74 - 99 mg/dL    BUN 12 7.0 - 18 MG/DL    Creatinine 0.97 0.6 - 1.3 MG/DL    BUN/Creatinine ratio 12 12 - 20      GFR est AA >60 >60 ml/min/1.73m2    GFR est non-AA >60 >60 ml/min/1.73m2    Calcium 9.7 8.5 - 10.1 MG/DL Bilirubin, total 1.0 0.2 - 1.0 MG/DL    ALT (SGPT) 39 13 - 56 U/L    AST (SGOT) 23 15 - 37 U/L    Alk. phosphatase 86 45 - 117 U/L    Protein, total 8.2 6.4 - 8.2 g/dL    Albumin 3.5 3.4 - 5.0 g/dL    Globulin 4.7 (H) 2.0 - 4.0 g/dL    A-G Ratio 0.7 (L) 0.8 - 1.7     CARDIAC PANEL,(CK, CKMB & TROPONIN)    Collection Time: 08/19/18  6:30 PM   Result Value Ref Range     26 - 192 U/L    CK - MB 1.0 <3.6 ng/ml    CK-MB Index 0.8 0.0 - 4.0 %    Troponin-I, Qt. <0.02 0.00 - 0.06 NG/ML   URINALYSIS W/ RFLX MICROSCOPIC    Collection Time: 08/19/18  7:15 PM   Result Value Ref Range    Color YELLOW      Appearance CLEAR      Specific gravity 1.016 1.005 - 1.030      pH (UA) 6.0 5.0 - 8.0      Protein NEGATIVE  NEG mg/dL    Glucose >1000 (A) NEG mg/dL    Ketone NEGATIVE  NEG mg/dL    Bilirubin NEGATIVE  NEG      Blood MODERATE (A) NEG      Urobilinogen 1.0 0.2 - 1.0 EU/dL    Nitrites NEGATIVE  NEG      Leukocyte Esterase NEGATIVE  NEG     URINE MICROSCOPIC ONLY    Collection Time: 08/19/18  7:15 PM   Result Value Ref Range    WBC 0 to 3 0 - 5 /hpf    RBC 2 to 4 0 - 5 /hpf    Epithelial cells 1+ 0 - 5 /lpf    Bacteria FEW (A) NEG /hpf   HCG URINE, QL. - POC    Collection Time: 08/19/18  7:24 PM   Result Value Ref Range    Pregnancy test,urine (POC) NEGATIVE  NEG         Radiologic Studies -   XR CHEST PA LAT    (Results Pending)     8:23 PM  RADIOLOGY FINDINGS  Chest X-ray shows no acute process  Pending review by Radiologist  Recorded by Gala Patel ED Scribe, as dictated by Deshawn Ortega PA-C     Medications given in the ED-  Medications   sodium chloride 0.9 % bolus infusion 500 mL (0 mL IntraVENous IV Completed 8/19/18 1918)   ketorolac (TORADOL) injection 30 mg (30 mg IntraVENous Given 8/19/18 1956)         Medical Decision Making   I am the first provider for this patient.     I reviewed the vital signs, available nursing notes, past medical history, past surgical history, family history and social history. Vital Signs-Reviewed the patient's vital signs. Pulse Oximetry Analysis - 100% on RA     Cardiac Monitor:  Rate: 72 bpm  Rhythm: NSR    EKG interpretation: (Preliminary)  NSR at 60 bpm. QTc is 384 ms. No ST elevation. EKG read by Ramos Guerrero PA-C at 5:59 PM       Records Reviewed: Nursing Notes and Old Medical Records    Procedures:  Procedures    ED Course:   6:04PM  Initial assessment performed. The patients presenting problems have been discussed, and they are in agreement with the care plan formulated and outlined with them. I have encouraged them to ask questions as they arise throughout their visit. PROGRESS NOTE:  8:23 PM  Pt re-evaluated. Pt feeling better. She is resting comfortably, and is reclined on the stretcher reading a book. Her blood pressure is reading 142/103. Long discussion regarding importance of follow up, for close blood pressure watching and possible medication adjustment. She is hopeful for discharge. Strict return precautions discussed. .  Written by Zheng [address] [facility], ED Scribe, as dictated by Ramos Guerrero PA-C. Diagnosis and Disposition       DISCHARGE NOTE:  8:25 PM  Dennis Lennon's  results have been reviewed with her. She has been counseled regarding her diagnosis, treatment, and plan. She verbally conveys understanding and agreement of the signs, symptoms, diagnosis, treatment and prognosis and additionally agrees to follow up as discussed. She also agrees with the care-plan and conveys that all of her questions have been answered. I have also provided discharge instructions for her that include: educational information regarding their diagnosis and treatment, and list of reasons why they would want to return to the ED prior to their follow-up appointment, should her condition change. She has been provided with education for proper emergency department utilization. CLINICAL IMPRESSION:  1. Chest pain, unspecified type    2. Hyperglycemia    3. Diabetes mellitus type 2, noninsulin dependent ([facility] [address]    4. Poorly-controlled hypertension        PLAN:  1. D/C Home  2. Current Discharge Medication List        3. Follow-up Information     Follow up With Details Comments Contact Info    Gio Dhaliwal MD Call in 1 day for primary care follow up [address] [facility] [address]      THE [facility] EMERGENCY DEPT  As needed, If symptoms worsen [address]  [address] [facility] [address] [phone]        _______________________________    Attestations: This note is prepared by Jeremiah Chan, acting as Scribe for Deshawn Ortega PA-C. Deshawn Ortega PA-C:  The scribe's documentation has been prepared under my direction and personally reviewed by me in its entirety.   I confirm that the note above accurately reflects all work, treatment, procedures, and medical decision making performed by me.  _______________________________

## 2018-08-20 NOTE — ED NOTES
Pt reporting she is feeling better with having 3/10 pain relief of her mid chest.  Pt voicing no complaints. Pt d/c home with d/c instructions reviewed. Understanding acknowledged by pt. Pt ambulatory upon d/c home with NAD observed. Leaving with family member.

## 2018-08-20 NOTE — DISCHARGE INSTRUCTIONS
High Blood Pressure: Care Instructions  Your Care Instructions    If your blood pressure is usually above 130/80, you have high blood pressure, or hypertension. That means the top number is 130 or higher or the bottom number is 80 or higher, or both. Despite what a lot of people think, high blood pressure usually doesn't cause headaches or make you feel dizzy or lightheaded. It usually has no symptoms. But it does increase your risk for heart attack, stroke, and kidney or eye damage. The higher your blood pressure, the more your risk increases. Your doctor will give you a goal for your blood pressure. Your goal will be based on your health and your age. Lifestyle changes, such as eating healthy and being active, are always important to help lower blood pressure. You might also take medicine to reach your blood pressure goal.  Follow-up care is a key part of your treatment and safety. Be sure to make and go to all appointments, and call your doctor if you are having problems. It's also a good idea to know your test results and keep a list of the medicines you take. How can you care for yourself at home? Medical treatment  · If you stop taking your medicine, your blood pressure will go back up. You may take one or more types of medicine to lower your blood pressure. Be safe with medicines. Take your medicine exactly as prescribed. Call your doctor if you think you are having a problem with your medicine. · Talk to your doctor before you start taking aspirin every day. Aspirin can help certain people lower their risk of a heart attack or stroke. But taking aspirin isn't right for everyone, because it can cause serious bleeding. · See your doctor regularly. You may need to see the doctor more often at first or until your blood pressure comes down. · If you are taking blood pressure medicine, talk to your doctor before you take decongestants or anti-inflammatory medicine, such as ibuprofen.  Some of these medicines can raise blood pressure. · Learn how to check your blood pressure at home. Lifestyle changes  · Stay at a healthy weight. This is especially important if you put on weight around the waist. Losing even 10 pounds can help you lower your blood pressure. · If your doctor recommends it, get more exercise. Walking is a good choice. Bit by bit, increase the amount you walk every day. Try for at least 30 minutes on most days of the week. You also may want to swim, bike, or do other activities. · Avoid or limit alcohol. Talk to your doctor about whether you can drink any alcohol. · Try to limit how much sodium you eat to less than 2,300 milligrams (mg) a day. Your doctor may ask you to try to eat less than 1,500 mg a day. · Eat plenty of fruits (such as bananas and oranges), vegetables, legumes, whole grains, and low-fat dairy products. · Lower the amount of saturated fat in your diet. Saturated fat is found in animal products such as milk, cheese, and meat. Limiting these foods may help you lose weight and also lower your risk for heart disease. · Do not smoke. Smoking increases your risk for heart attack and stroke. If you need help quitting, talk to your doctor about stop-smoking programs and medicines. These can increase your chances of quitting for good. When should you call for help? Call 911 anytime you think you may need emergency care. This may mean having symptoms that suggest that your blood pressure is causing a serious heart or blood vessel problem. Your blood pressure may be over 180/110.   For example, call 911 if:    · You have symptoms of a heart attack. These may include:  ¨ Chest pain or pressure, or a strange feeling in the chest.  ¨ Sweating. ¨ Shortness of breath. ¨ Nausea or vomiting. ¨ Pain, pressure, or a strange feeling in the back, neck, jaw, or upper belly or in one or both shoulders or arms. ¨ Lightheadedness or sudden weakness.   ¨ A fast or irregular heartbeat.     · You have symptoms of a stroke. These may include:  ¨ Sudden numbness, tingling, weakness, or loss of movement in your face, arm, or leg, especially on only one side of your body. ¨ Sudden vision changes. ¨ Sudden trouble speaking. ¨ Sudden confusion or trouble understanding simple statements. ¨ Sudden problems with walking or balance. ¨ A sudden, severe headache that is different from past headaches.     · You have severe back or belly pain.    Do not wait until your blood pressure comes down on its own. Get help right away.   Call your doctor now or seek immediate care if:    · Your blood pressure is much higher than normal (such as 180/110 or higher), but you don't have symptoms.     · You think high blood pressure is causing symptoms, such as:  ¨ Severe headache. ¨ Blurry vision.    Watch closely for changes in your health, and be sure to contact your doctor if:    · Your blood pressure measures 140/90 or higher at least 2 times. That means the top number is 140 or higher or the bottom number is 90 or higher, or both.     · You think you may be having side effects from your blood pressure medicine.     · Your blood pressure is usually normal, but it goes above normal at least 2 times. Where can you learn more? Go to http://nadine-pierce.info/. Enter Z723 in the search box to learn more about \"High Blood Pressure: Care Instructions. \"  Current as of: December 6, 2017  Content Version: 11.7  © 2365-9013 [employer]. Care instructions adapted under license by [employer] (which disclaims liability or warranty for this information). If you have questions about a medical condition or this instruction, always ask your healthcare professional. [address] any warranty or liability for your use of this information.        DASH Diet: Care Instructions  Your Care Instructions    The DASH diet is an eating plan that can help lower your blood pressure. DASH stands for Dietary Approaches to Stop Hypertension. Hypertension is high blood pressure. The DASH diet focuses on eating foods that are high in calcium, potassium, and magnesium. These nutrients can lower blood pressure. The foods that are highest in these nutrients are fruits, vegetables, low-fat dairy products, nuts, seeds, and legumes. But taking calcium, potassium, and magnesium supplements instead of eating foods that are high in those nutrients does not have the same effect. The DASH diet also includes whole grains, fish, and poultry. The DASH diet is one of several lifestyle changes your doctor may recommend to lower your high blood pressure. Your doctor may also want you to decrease the amount of sodium in your diet. Lowering sodium while following the DASH diet can lower blood pressure even further than just the DASH diet alone. Follow-up care is a key part of your treatment and safety. Be sure to make and go to all appointments, and call your doctor if you are having problems. It's also a good idea to know your test results and keep a list of the medicines you take. How can you care for yourself at home? Following the DASH diet  · Eat 4 to 5 servings of fruit each day. A serving is 1 medium-sized piece of fruit, ½ cup chopped or canned fruit, 1/4 cup dried fruit, or 4 ounces (½ cup) of fruit juice. Choose fruit more often than fruit juice. · Eat 4 to 5 servings of vegetables each day. A serving is 1 cup of lettuce or raw leafy vegetables, ½ cup of chopped or cooked vegetables, or 4 ounces (½ cup) of vegetable juice. Choose vegetables more often than vegetable juice. · Get 2 to 3 servings of low-fat and fat-free dairy each day. A serving is 8 ounces of milk, 1 cup of yogurt, or 1 ½ ounces of cheese. · Eat 6 to 8 servings of grains each day.  A serving is 1 slice of bread, 1 ounce of dry cereal, or ½ cup of cooked rice, pasta, or cooked cereal. Try to choose whole-grain products as much as possible. · Limit lean meat, poultry, and fish to 2 servings each day. A serving is 3 ounces, about the size of a deck of cards. · Eat 4 to 5 servings of nuts, seeds, and legumes (cooked dried beans, lentils, and split peas) each week. A serving is 1/3 cup of nuts, 2 tablespoons of seeds, or ½ cup of cooked beans or peas. · Limit fats and oils to 2 to 3 servings each day. A serving is 1 teaspoon of vegetable oil or 2 tablespoons of salad dressing. · Limit sweets and added sugars to 5 servings or less a week. A serving is 1 tablespoon jelly or jam, ½ cup sorbet, or 1 cup of lemonade. · Eat less than 2,300 milligrams (mg) of sodium a day. If you limit your sodium to 1,500 mg a day, you can lower your blood pressure even more. Tips for success  · Start small. Do not try to make dramatic changes to your diet all at once. You might feel that you are missing out on your favorite foods and then be more likely to not follow the plan. Make small changes, and stick with them. Once those changes become habit, add a few more changes. · Try some of the following:  ¨ Make it a goal to eat a fruit or vegetable at every meal and at snacks. This will make it easy to get the recommended amount of fruits and vegetables each day. ¨ Try yogurt topped with fruit and nuts for a snack or healthy dessert. ¨ Add lettuce, tomato, cucumber, and onion to sandwiches. ¨ Combine a ready-made pizza crust with low-fat mozzarella cheese and lots of vegetable toppings. Try using tomatoes, squash, spinach, broccoli, carrots, cauliflower, and onions. ¨ Have a variety of cut-up vegetables with a low-fat dip as an appetizer instead of chips and dip. ¨ Sprinkle sunflower seeds or chopped almonds over salads. Or try adding chopped walnuts or almonds to cooked vegetables. ¨ Try some vegetarian meals using beans and peas. Add garbanzo or kidney beans to salads. Make burritos and tacos with mashed daily beans or black beans.   Where can you learn more? Go to http://nadine-pierce.info/. Enter U034 in the search box to learn more about \"DASH Diet: Care Instructions. \"  Current as of: December 6, 2017  Content Version: 11.7  © 4361-9513 [employer]. Care instructions adapted under license by [employer] (which disclaims liability or warranty for this information). If you have questions about a medical condition or this instruction, always ask your healthcare professional. [address] any warranty or liability for your use of this information. Learning About High Blood Sugar  What is high blood sugar? Your body turns the food you eat into glucose (sugar), which it uses for energy. But if your body isn't able to use the sugar right away, it can build up in your blood and lead to high blood sugar. When the amount of sugar in your blood stays too high for too much of the time, you may have diabetes. Diabetes is a disease that can cause serious health problems. The good news is that lifestyle changes may help you get your blood sugar back to normal and avoid or delay diabetes. What causes high blood sugar? Sugar (glucose) can build up in your blood if you:  · Are overweight. · Have a family history of diabetes. · Take certain medicines, such as steroids. What are the symptoms? Having high blood sugar may not cause any symptoms at all. Or it may make you feel very thirsty or very hungry. You may also urinate more often than usual, have blurry vision, or lose weight without trying. How is high blood sugar treated? You can take steps to lower your blood sugar level if you understand what makes it get higher. Your doctor may want you to learn how to test your blood sugar level at home. Then you can see how illness, stress, or different kinds of food or medicine raise or lower your blood sugar level. Other tests may be needed to see if you have diabetes.   How can you prevent high blood sugar? · Watch your weight. If you're overweight, losing just a small amount of weight may help. Reducing fat around your waist is most important. · Limit the amount of calories, sweets, and unhealthy fat you eat. Ask your doctor if a dietitian can help you. A registered dietitian can help you create meal plans that fit your lifestyle. · Get at least 30 minutes of exercise on most days of the week. Exercise helps control your blood sugar. It also helps you maintain a healthy weight. Walking is a good choice. You also may want to do other activities, such as running, swimming, cycling, or playing tennis or team sports. · If your doctor prescribed medicines, take them exactly as prescribed. Call your doctor if you think you are having a problem with your medicine. You will get more details on the specific medicines your doctor prescribes. Follow-up care is a key part of your treatment and safety. Be sure to make and go to all appointments, and call your doctor if you are having problems. It's also a good idea to know your test results and keep a list of the medicines you take. Where can you learn more? Go to http://nadineDeporvillagepierce.info/. Enter O108 in the search box to learn more about \"Learning About High Blood Sugar. \"  Current as of: December 7, 2017  Content Version: 11.7  © 7821-1702 [employer]. Care instructions adapted under license by [employer] (which disclaims liability or warranty for this information). If you have questions about a medical condition or this instruction, always ask your healthcare professional. [address] any warranty or liability for your use of this information. Chest Pain: Care Instructions  Your Care Instructions    There are many things that can cause chest pain. Some are not serious and will get better on their own in a few days. But some kinds of chest pain need more testing and treatment.  Your doctor may have recommended a follow-up visit in the next 8 to 12 hours. If you are not getting better, you may need more tests or treatment. Even though your doctor has released you, you still need to watch for any problems. The doctor carefully checked you, but sometimes problems can develop later. If you have new symptoms or if your symptoms do not get better, get medical care right away. If you have worse or different chest pain or pressure that lasts more than 5 minutes or you passed out (lost consciousness), call 911 or seek other emergency help right away. A medical visit is only one step in your treatment. Even if you feel better, you still need to do what your doctor recommends, such as going to all suggested follow-up appointments and taking medicines exactly as directed. This will help you recover and help prevent future problems. How can you care for yourself at home? · Rest until you feel better. · Take your medicine exactly as prescribed. Call your doctor if you think you are having a problem with your medicine. · Do not drive after taking a prescription pain medicine. When should you call for help? Call 911 if:    · You passed out (lost consciousness).     · You have severe difficulty breathing.     · You have symptoms of a heart attack. These may include:  ¨ Chest pain or pressure, or a strange feeling in your chest.  ¨ Sweating. ¨ Shortness of breath. ¨ Nausea or vomiting. ¨ Pain, pressure, or a strange feeling in your back, neck, jaw, or upper belly or in one or both shoulders or arms. ¨ Lightheadedness or sudden weakness. ¨ A fast or irregular heartbeat. After you call 911, the  may tell you to chew 1 adult-strength or 2 to 4 low-dose aspirin. Wait for an ambulance.  Do not try to drive yourself.    Call your doctor today if:    · You have any trouble breathing.     · Your chest pain gets worse.     · You are dizzy or lightheaded, or you feel like you may faint.     · You are not getting better as expected.     · You are having new or different chest pain. Where can you learn more? Go to http://nadine-pierce.info/. Enter A120 in the search box to learn more about \"Chest Pain: Care Instructions. \"  Current as of: November 20, 2017  Content Version: 11.7  © 6615-2798 [employer]. Care instructions adapted under license by [employer] (which disclaims liability or warranty for this information). If you have questions about a medical condition or this instruction, always ask your healthcare professional. [address] any warranty or liability for your use of this information.

## 2018-08-29 LAB
ATRIAL RATE: 60 BPM
CALCULATED P AXIS, ECG09: 39 DEGREES
CALCULATED R AXIS, ECG10: 24 DEGREES
CALCULATED T AXIS, ECG11: 29 DEGREES
DIAGNOSIS, 93000: NORMAL
P-R INTERVAL, ECG05: 170 MS
Q-T INTERVAL, ECG07: 384 MS
QRS DURATION, ECG06: 100 MS
QTC CALCULATION (BEZET), ECG08: 384 MS
VENTRICULAR RATE, ECG03: 60 BPM

## 2019-02-20 ENCOUNTER — HOSPITAL ENCOUNTER (EMERGENCY)
Age: 49
Discharge: HOME | End: 2019-02-20
Attending: EMERGENCY MEDICINE | Admitting: EMERGENCY MEDICINE
Payer: COMMERCIAL

## 2019-02-20 ENCOUNTER — APPOINTMENT (OUTPATIENT)
Dept: ULTRASOUND IMAGING | Age: 49
End: 2019-02-20
Attending: EMERGENCY MEDICINE
Payer: COMMERCIAL

## 2019-02-20 ENCOUNTER — APPOINTMENT (OUTPATIENT)
Dept: CT IMAGING | Age: 49
End: 2019-02-20
Attending: EMERGENCY MEDICINE
Payer: COMMERCIAL

## 2019-02-20 VITALS
RESPIRATION RATE: 18 BRPM | SYSTOLIC BLOOD PRESSURE: 156 MMHG | WEIGHT: 197 LBS | OXYGEN SATURATION: 100 % | HEIGHT: 63 IN | BODY MASS INDEX: 34.91 KG/M2 | DIASTOLIC BLOOD PRESSURE: 72 MMHG | TEMPERATURE: 98.4 F | HEART RATE: 68 BPM

## 2019-02-20 DIAGNOSIS — N30.01 ACUTE CYSTITIS WITH HEMATURIA: Primary | ICD-10-CM

## 2019-02-20 DIAGNOSIS — N83.201 RIGHT OVARIAN CYST: ICD-10-CM

## 2019-02-20 LAB
ALBUMIN SERPL-MCNC: 3.4 G/DL (ref 3.4–5)
ALBUMIN/GLOB SERPL: 0.8 {RATIO} (ref 0.8–1.7)
ALP SERPL-CCNC: 81 U/L (ref 45–117)
ALT SERPL-CCNC: 94 U/L (ref 13–56)
ANION GAP SERPL CALC-SCNC: 8 MMOL/L (ref 3–18)
APPEARANCE UR: ABNORMAL
AST SERPL-CCNC: 45 U/L (ref 15–37)
BACTERIA URNS QL MICRO: ABNORMAL /HPF
BASOPHILS # BLD: 0 K/UL (ref 0–0.1)
BASOPHILS NFR BLD: 0 % (ref 0–2)
BILIRUB SERPL-MCNC: 0.9 MG/DL (ref 0.2–1)
BILIRUB UR QL: NEGATIVE
BUN SERPL-MCNC: 10 MG/DL (ref 7–18)
BUN/CREAT SERPL: 14 (ref 12–20)
CALCIUM SERPL-MCNC: 9.6 MG/DL (ref 8.5–10.1)
CHLORIDE SERPL-SCNC: 103 MMOL/L (ref 100–108)
CO2 SERPL-SCNC: 26 MMOL/L (ref 21–32)
COLOR UR: YELLOW
CREAT SERPL-MCNC: 0.71 MG/DL (ref 0.6–1.3)
DIFFERENTIAL METHOD BLD: ABNORMAL
EOSINOPHIL # BLD: 0.1 K/UL (ref 0–0.4)
EOSINOPHIL NFR BLD: 1 % (ref 0–5)
EPITH CASTS URNS QL MICRO: ABNORMAL /LPF (ref 0–5)
ERYTHROCYTE [DISTWIDTH] IN BLOOD BY AUTOMATED COUNT: 13.2 % (ref 11.6–14.5)
GLOBULIN SER CALC-MCNC: 4.4 G/DL (ref 2–4)
GLUCOSE BLD STRIP.AUTO-MCNC: 187 MG/DL (ref 70–110)
GLUCOSE SERPL-MCNC: 188 MG/DL (ref 74–99)
GLUCOSE UR STRIP.AUTO-MCNC: NEGATIVE MG/DL
HCG UR QL: NEGATIVE
HCT VFR BLD AUTO: 34.4 % (ref 35–45)
HGB BLD-MCNC: 11.3 G/DL (ref 12–16)
HGB UR QL STRIP: ABNORMAL
KETONES UR QL STRIP.AUTO: NEGATIVE MG/DL
LEUKOCYTE ESTERASE UR QL STRIP.AUTO: ABNORMAL
LIPASE SERPL-CCNC: 178 U/L (ref 73–393)
LYMPHOCYTES # BLD: 2.4 K/UL (ref 0.9–3.6)
LYMPHOCYTES NFR BLD: 22 % (ref 21–52)
MAGNESIUM SERPL-MCNC: 1.5 MG/DL (ref 1.6–2.6)
MCH RBC QN AUTO: 27.6 PG (ref 24–34)
MCHC RBC AUTO-ENTMCNC: 32.8 G/DL (ref 31–37)
MCV RBC AUTO: 83.9 FL (ref 74–97)
MONOCYTES # BLD: 0.5 K/UL (ref 0.05–1.2)
MONOCYTES NFR BLD: 5 % (ref 3–10)
NEUTS SEG # BLD: 8.1 K/UL (ref 1.8–8)
NEUTS SEG NFR BLD: 72 % (ref 40–73)
NITRITE UR QL STRIP.AUTO: POSITIVE
PH UR STRIP: 7.5 [PH] (ref 5–8)
PLATELET # BLD AUTO: 282 K/UL (ref 135–420)
PMV BLD AUTO: 10.2 FL (ref 9.2–11.8)
POTASSIUM SERPL-SCNC: 4 MMOL/L (ref 3.5–5.5)
PROT SERPL-MCNC: 7.8 G/DL (ref 6.4–8.2)
PROT UR STRIP-MCNC: 300 MG/DL
RBC # BLD AUTO: 4.1 M/UL (ref 4.2–5.3)
RBC #/AREA URNS HPF: ABNORMAL /HPF (ref 0–5)
SODIUM SERPL-SCNC: 137 MMOL/L (ref 136–145)
SP GR UR REFRACTOMETRY: 1.02 (ref 1–1.03)
UROBILINOGEN UR QL STRIP.AUTO: 0.2 EU/DL (ref 0.2–1)
WBC # BLD AUTO: 11.1 K/UL (ref 4.6–13.2)
WBC URNS QL MICRO: ABNORMAL /HPF (ref 0–5)

## 2019-02-20 PROCEDURE — 96374 THER/PROPH/DIAG INJ IV PUSH: CPT

## 2019-02-20 PROCEDURE — 82962 GLUCOSE BLOOD TEST: CPT

## 2019-02-20 PROCEDURE — 83690 ASSAY OF LIPASE: CPT

## 2019-02-20 PROCEDURE — 96375 TX/PRO/DX INJ NEW DRUG ADDON: CPT

## 2019-02-20 PROCEDURE — 74011250636 HC RX REV CODE- 250/636: Performed by: EMERGENCY MEDICINE

## 2019-02-20 PROCEDURE — 80053 COMPREHEN METABOLIC PANEL: CPT

## 2019-02-20 PROCEDURE — 96361 HYDRATE IV INFUSION ADD-ON: CPT

## 2019-02-20 PROCEDURE — 83735 ASSAY OF MAGNESIUM: CPT

## 2019-02-20 PROCEDURE — 85025 COMPLETE CBC W/AUTO DIFF WBC: CPT

## 2019-02-20 PROCEDURE — 99285 EMERGENCY DEPT VISIT HI MDM: CPT

## 2019-02-20 PROCEDURE — 81025 URINE PREGNANCY TEST: CPT

## 2019-02-20 PROCEDURE — 81001 URINALYSIS AUTO W/SCOPE: CPT

## 2019-02-20 PROCEDURE — 76856 US EXAM PELVIC COMPLETE: CPT

## 2019-02-20 PROCEDURE — 74176 CT ABD & PELVIS W/O CONTRAST: CPT

## 2019-02-20 PROCEDURE — 74011000250 HC RX REV CODE- 250: Performed by: EMERGENCY MEDICINE

## 2019-02-20 RX ORDER — FAMOTIDINE 10 MG/ML
20 INJECTION INTRAVENOUS
Status: COMPLETED | OUTPATIENT
Start: 2019-02-20 | End: 2019-02-20

## 2019-02-20 RX ORDER — CEPHALEXIN 500 MG/1
500 CAPSULE ORAL 4 TIMES DAILY
Qty: 28 CAP | Refills: 0 | Status: SHIPPED | OUTPATIENT
Start: 2019-02-20 | End: 2019-02-27

## 2019-02-20 RX ORDER — KETOROLAC TROMETHAMINE 15 MG/ML
15 INJECTION, SOLUTION INTRAMUSCULAR; INTRAVENOUS
Status: COMPLETED | OUTPATIENT
Start: 2019-02-20 | End: 2019-02-20

## 2019-02-20 RX ORDER — LOSARTAN POTASSIUM 50 MG/1
50 TABLET ORAL DAILY
COMMUNITY
End: 2022-10-21

## 2019-02-20 RX ADMIN — FAMOTIDINE 20 MG: 10 INJECTION, SOLUTION INTRAVENOUS at 08:58

## 2019-02-20 RX ADMIN — SODIUM CHLORIDE 1000 ML: 9 INJECTION, SOLUTION INTRAVENOUS at 08:55

## 2019-02-20 RX ADMIN — CEFTRIAXONE 1 G: 1 INJECTION, POWDER, FOR SOLUTION INTRAMUSCULAR; INTRAVENOUS at 09:19

## 2019-02-20 RX ADMIN — KETOROLAC TROMETHAMINE 15 MG: 15 INJECTION, SOLUTION INTRAMUSCULAR; INTRAVENOUS at 08:56

## 2019-02-20 NOTE — DISCHARGE INSTRUCTIONS
Functional Ovarian Cyst: Care Instructions  Your Care Instructions    A functional ovarian cyst is a sac that forms on the surface of a woman's ovary during ovulation. The sac holds a maturing egg. Usually the sac goes away after the egg is released. But if the egg is not released, or if the sac closes up after the egg is released, the sac can swell up with fluid and form a cyst.  Functional ovarian cysts are different than ovarian growths caused by other problems, such as cancer. Most functional ovarian cysts cause no symptoms and go away on their own. Some cause mild pain. Others can cause severe pain when they rupture or bleed. Follow-up care is a key part of your treatment and safety. Be sure to make and go to all appointments, and call your doctor if you are having problems. It's also a good idea to know your test results and keep a list of the medicines you take. How can you care for yourself at home? · Use heat, such as a hot water bottle, a heating pad set on low, or a warm bath, to relax tense muscles and relieve cramping. · Be safe with medicines. Take pain medicines exactly as directed. ? If the doctor gave you a prescription medicine for pain, take it as prescribed. ? If you are not taking a prescription pain medicine, ask your doctor if you can take an over-the-counter medicine. · Avoid constipation. Make sure you drink enough fluids and include fruits, vegetables, and fiber in your diet each day. Constipation does not cause ovarian cysts, but it may make your pelvic pain worse. When should you call for help? Call your doctor now or seek immediate medical care if:    · You have severe vaginal bleeding.     · You have new or worse belly or pelvic pain.    Watch closely for changes in your health, and be sure to contact your doctor if:    · You have unusual vaginal bleeding.     · You do not get better as expected. Where can you learn more?   Go to http://nadine-pierce.info/. Enter W216 in the search box to learn more about \"Functional Ovarian Cyst: Care Instructions. \"  Current as of: May 14, 2018  Content Version: 11.9  © 6165-6935 [employer] Care instructions adapted under license by [employer] (which disclaims liability or warranty for this information). If you have questions about a medical condition or this instruction, always ask your healthcare professional. [place] [address] any warranty or liability for your use of this information. Urinary Tract Infection in Women: Care Instructions  Your Care Instructions    A urinary tract infection, or UTI, is a general term for an infection anywhere between the kidneys and the urethra (where urine comes out). Most UTIs are bladder infections. They often cause pain or burning when you urinate. UTIs are caused by bacteria and can be cured with antibiotics. Be sure to complete your treatment so that the infection goes away. Follow-up care is a key part of your treatment and safety. Be sure to make and go to all appointments, and call your doctor if you are having problems. It's also a good idea to know your test results and keep a list of the medicines you take. How can you care for yourself at home? · Take your antibiotics as directed. Do not stop taking them just because you feel better. You need to take the full course of antibiotics. · Drink extra water and other fluids for the next day or two. This may help wash out the bacteria that are causing the infection. (If you have kidney, heart, or liver disease and have to limit fluids, talk with your doctor before you increase your fluid intake.)  · Avoid drinks that are carbonated or have caffeine. They can irritate the bladder. · Urinate often. Try to empty your bladder each time. · To relieve pain, take a hot bath or lay a heating pad set on low over your lower belly or genital area.  Never go to sleep with a heating pad in place. To prevent UTIs  · Drink plenty of water each day. This helps you urinate often, which clears bacteria from your system. (If you have kidney, heart, or liver disease and have to limit fluids, talk with your doctor before you increase your fluid intake.)  · Urinate when you need to. · Urinate right after you have sex. · Change sanitary pads often. · Avoid douches, bubble baths, feminine hygiene sprays, and other feminine hygiene products that have deodorants. · After going to the bathroom, wipe from front to back. When should you call for help? Call your doctor now or seek immediate medical care if:    · Symptoms such as fever, chills, nausea, or vomiting get worse or appear for the first time.     · You have new pain in your back just below your rib cage. This is called flank pain.     · There is new blood or pus in your urine.     · You have any problems with your antibiotic medicine.    Watch closely for changes in your health, and be sure to contact your doctor if:    · You are not getting better after taking an antibiotic for 2 days.     · Your symptoms go away but then come back. Where can you learn more? Go to http://nadine-pierce.info/. Enter C684 in the search box to learn more about \"Urinary Tract Infection in Women: Care Instructions. \"  Current as of: March 20, 2018  Content Version: 11.9  © 3568-5345 [employer]. Care instructions adapted under license by [employer] (which disclaims liability or warranty for this information). If you have questions about a medical condition or this instruction, always ask your healthcare professional. [address] any warranty or liability for your use of this information.

## 2019-02-20 NOTE — ED NOTES
Pt provided with warm blankets and returned from CT. Pt rates pain 7/10, pt sleeping when RN enters room.

## 2019-02-20 NOTE — ED PROVIDER NOTES
EMERGENCY DEPARTMENT HISTORY AND PHYSICAL EXAM    Date: 2/20/2019  Patient Name: Kendra Valdez    History of Presenting Illness     Chief Complaint   Patient presents with    Abdominal Pain         History Provided By: Patient    Chief Complaint: Abdominal pain  Duration: 1 Days  Timing:  Acute  Location: RLQ radiating into right lower back  Severity: 10 out of 10  Modifying Factors: Some alleviation with Tylenol. Associated Symptoms: nausea and vomiting    Additional History (Context):   7:50 AM   Kendra Valdez is a 57 y.o. female with PMHX of HTN and diabetes who presents to the emergency department C/O worsening RLQ abdominal pain (10/10) radiating into her right lower back onset yesterday. Associated sxs include nausea and vomiting. Some alleviation with Tylenol. LNMP was November 2018 but patient denies chance of pregnancy. PSHx of tubal ligation. Pt denies hematuria, dysuria, vaginal discharge, Hx of kidney stones or gall bladder issues, fever, chills, vaginal bleeding, and any other sxs or complaints. PCP: Marylynn Soulier, DO    Current Outpatient Medications   Medication Sig Dispense Refill    losartan (COZAAR) 50 mg tablet Take 50 mg by mouth daily.  cephALEXin (KEFLEX) 500 mg capsule Take 1 Cap by mouth four (4) times daily for 7 days. 28 Cap 0    pravastatin (PRAVACHOL) 10 mg tablet Take  by mouth nightly.  SITagliptin (JANUVIA) 25 mg tablet Take 25 mg by mouth daily.  albuterol (VENTOLIN HFA) 90 mcg/actuation inhaler Take 2 Puffs by inhalation every six (6) hours as needed for Wheezing. 1 Inhaler 0    metFORMIN (GLUCOPHAGE) 850 mg tablet Take 1 Tab by mouth two (2) times daily (with meals). 60 Tab 0    multivitamin, tx-iron-ca-min (THERA-M W/ IRON) 9 mg iron-400 mcg tab tablet Take 1 Tab by mouth daily. Formulary substitution for DAILY MULTIVITAMIN      Diabetic Supplies, Miscellan. kit 1 Each by Does Not Apply route daily.  1 Kit 0       Past History     Past Medical History:  Past Medical History:   Diagnosis Date    Asthma     Diabetes ([address]     High cholesterol     Hypertension        Past Surgical History:  Past Surgical History:   Procedure Laterality Date    HX GYN      tubal ligation        Family History:  History reviewed. No pertinent family history. Social History:  Social History     Tobacco Use    Smoking status: Former Smoker     Last attempt to quit: 4/11/2015     Years since quitting: 3.8    Smokeless tobacco: Never Used   Substance Use Topics    Alcohol use: No    Drug use: No       Allergies:  No Known Allergies      Review of Systems   Review of Systems   Constitutional: Negative for chills and fever. Gastrointestinal: Positive for abdominal pain (RLQ), nausea and vomiting. Negative for diarrhea. Genitourinary: Negative for dysuria, hematuria, vaginal bleeding and vaginal discharge. Musculoskeletal: Positive for back pain (right lower). All other systems reviewed and are negative. Physical Exam     Vitals:    02/20/19 0759   BP: 159/86   Pulse: 74   Resp: 20   Temp: 98.4 °F (36.9 °C)   SpO2: 100%   Weight: 89.4 kg (197 lb)   Height: 5' 3\" (1.6 m)     Physical Exam   Nursing note and vitals reviewed.   Constitutional: Middle aged obese  female, appearing uncomfortable, laying on left side in fetal position,  Head: Normocephalic, Atraumatic  Eyes: Pupils are equal, round, and reactive to light, EOMI, no scleral icterus, no conjunctival pallor  ENT: moist mucous membranes, hearing intact  Neck: Supple, non-tender  Cardiovascular: Regular rate and rhythm, no murmurs, rubs, or gallops  Chest: Normal work of breathing and chest excursion bilaterally  Lungs: Clear to ausculation bilaterally, no wheezes, no rhonchi  Abdomen: Soft, mild tenderness over right flank without rebound or guarding, non distended, normoactive bowel sounds  Back: No evidence of trauma or deformity  Extremities: No evidence of trauma or deformity, no LE edema  Skin: Warm and dry, normal cap refill  Neuro: Alert and appropriate, CN intact, normal speech, moving all 4 extremities freely and symmetrically  Psychiatric: Cooperative, appropriate mood       Diagnostic Study Results     Labs -     Recent Results (from the past 12 hour(s))   CBC WITH AUTOMATED DIFF    Collection Time: 02/20/19  8:00 AM   Result Value Ref Range    WBC 11.1 4.6 - 13.2 K/uL    RBC 4.10 (L) 4.20 - 5.30 M/uL    HGB 11.3 (L) 12.0 - 16.0 g/dL    HCT 34.4 (L) 35.0 - 45.0 %    MCV 83.9 74.0 - 97.0 FL    MCH 27.6 24.0 - 34.0 PG    MCHC 32.8 31.0 - 37.0 g/dL    RDW 13.2 11.6 - 14.5 %    PLATELET 089 835 - 237 K/uL    MPV 10.2 9.2 - 11.8 FL    NEUTROPHILS 72 40 - 73 %    LYMPHOCYTES 22 21 - 52 %    MONOCYTES 5 3 - 10 %    EOSINOPHILS 1 0 - 5 %    BASOPHILS 0 0 - 2 %    ABS. NEUTROPHILS 8.1 (H) 1.8 - 8.0 K/UL    ABS. LYMPHOCYTES 2.4 0.9 - 3.6 K/UL    ABS. MONOCYTES 0.5 0.05 - 1.2 K/UL    ABS. EOSINOPHILS 0.1 0.0 - 0.4 K/UL    ABS. BASOPHILS 0.0 0.0 - 0.1 K/UL    DF AUTOMATED     METABOLIC PANEL, COMPREHENSIVE    Collection Time: 02/20/19  8:00 AM   Result Value Ref Range    Sodium 137 136 - 145 mmol/L    Potassium 4.0 3.5 - 5.5 mmol/L    Chloride 103 100 - 108 mmol/L    CO2 26 21 - 32 mmol/L    Anion gap 8 3.0 - 18 mmol/L    Glucose 188 (H) 74 - 99 mg/dL    BUN 10 7.0 - 18 MG/DL    Creatinine 0.71 0.6 - 1.3 MG/DL    BUN/Creatinine ratio 14 12 - 20      GFR est AA >60 >60 ml/min/1.73m2    GFR est non-AA >60 >60 ml/min/1.73m2    Calcium 9.6 8.5 - 10.1 MG/DL    Bilirubin, total 0.9 0.2 - 1.0 MG/DL    ALT (SGPT) 94 (H) 13 - 56 U/L    AST (SGOT) 45 (H) 15 - 37 U/L    Alk.  phosphatase 81 45 - 117 U/L    Protein, total 7.8 6.4 - 8.2 g/dL    Albumin 3.4 3.4 - 5.0 g/dL    Globulin 4.4 (H) 2.0 - 4.0 g/dL    A-G Ratio 0.8 0.8 - 1.7     LIPASE    Collection Time: 02/20/19  8:00 AM   Result Value Ref Range    Lipase 178 73 - 393 U/L   MAGNESIUM    Collection Time: 02/20/19  8:00 AM   Result Value Ref Range Magnesium 1.5 (L) 1.6 - 2.6 mg/dL   URINALYSIS W/ RFLX MICROSCOPIC    Collection Time: 02/20/19  8:05 AM   Result Value Ref Range    Color YELLOW      Appearance CLOUDY      Specific gravity 1.017 1.005 - 1.030      pH (UA) 7.5 5.0 - 8.0      Protein 300 (A) NEG mg/dL    Glucose NEGATIVE  NEG mg/dL    Ketone NEGATIVE  NEG mg/dL    Bilirubin NEGATIVE  NEG      Blood LARGE (A) NEG      Urobilinogen 0.2 0.2 - 1.0 EU/dL    Nitrites POSITIVE (A) NEG      Leukocyte Esterase LARGE (A) NEG     URINE MICROSCOPIC ONLY    Collection Time: 02/20/19  8:05 AM   Result Value Ref Range    WBC TOO NUMEROUS TO COUNT 0 - 5 /hpf    RBC TOO NUMEROUS TO COUNT 0 - 5 /hpf    Epithelial cells 2+ 0 - 5 /lpf    Bacteria 4+ (A) NEG /hpf   GLUCOSE, POC    Collection Time: 02/20/19  8:13 AM   Result Value Ref Range    Glucose (POC) 187 (H) 70 - 110 mg/dL   HCG URINE, QL. - POC    Collection Time: 02/20/19  8:25 AM   Result Value Ref Range    Pregnancy test,urine (POC) NEGATIVE  NEG         Radiologic Studies -   US PELV NON OB W TV   Final Result   IMPRESSION:      1. Normal blood flow to each ovary without findings of ovarian torsion. 2. Multiply myomatous uterus. 3. Right ovarian cyst.     As read by the radiologist.     CT ABD PELV WO CONT   Final Result   IMPRESSION:         1. No hydronephrosis or hydroureter involving either kidney. Numerous pelvic   calcifications compatible phleboliths noted without definite distal ureteral or   urinary bladder stone. Punctate, nonobstructing left renal stone. 2. Enlarged and myomatous uterus. 3. Simple appearing low density structure contiguous with the right adnexa,   likely an ovarian cyst.      4. Normal caliber small and large bowel, to include a normal appendix. 5. Hepatic steatosis. As read by the radiologist.     CT Results  (Last 48 hours)               02/20/19 0847  CT ABD PELV WO CONT Final result    Impression:  IMPRESSION:           1.  No hydronephrosis or hydroureter involving either kidney. Numerous pelvic   calcifications compatible phleboliths noted without definite distal ureteral or   urinary bladder stone. Punctate, nonobstructing left renal stone. 2. Enlarged and myomatous uterus. 3. Simple appearing low density structure contiguous with the right adnexa,   likely an ovarian cyst.       4. Normal caliber small and large bowel, to include a normal appendix. 5. Hepatic steatosis. Narrative:  EXAM: CT of the abdomen and pelvis       INDICATION: Right lower quadrant and flank pain       COMPARISON: None. TECHNIQUE: Axial CT imaging of the abdomen and pelvis was performed without oral   or intravenous contrast. Multiplanar reformats were generated. One or more dose reduction techniques were used on this CT: automated exposure   control, adjustment of the mAs and/or kVp according to patient size, and   iterative reconstruction techniques. The specific techniques used on this CT   exam have been documented in the patient's electronic medical record. Digital   Imaging and Communications in Medicine (DICOM) format image data are available   to nonaffiliated external healthcare facilities or entities on a secure, media   free, reciprocally searchable basis with patient authorization for at least a   12-month period after this study. _______________       FINDINGS:       LOWER CHEST: Minor atelectasis present at the right lung base. Lungs otherwise   clear. Normal cardiac size. No pericardial effusion. LIVER, BILIARY: Unenhanced appearance of the liver is remarkable for relatively   diffuse hepatic hypoattenuation. No focal hepatic lesion. No biliary dilation. Nonspecific enlargement of the gallbladder demonstrated. PANCREAS: Normal unenhanced appearance. Small amount of fat noted within the   pancreatic tail.        SPLEEN: Normal.       ADRENALS: Normal.       KIDNEYS/URETERS/BLADDER: No hydronephrosis involving either kidney. Punctate   nonobstructing inferior left calyceal stone. No right-sided no nephrolithiasis. Medial interpolar right renal cyst present. There are numerous rounded calcifications within the pelvis, the morphology of   which is most in keeping with phleboliths. These calcifications appear separate   from the ureters, with no definite distal ureteral or bladder stone   demonstrated. PELVIC ORGANS: Uterus is enlarged and lobular in contour, likely in keeping with   underlying leiomyomata. Low attenuating structure in continuity with the right   adnexa measures approximately 3.6 x 5.6 x 6.7 cm in size. There is a small   punctate focus of calcification adjacent to the inferior portion of this   structure likely small phlebolith within the gonadal vein, as demonstrated on   the contralateral side. VASCULATURE: Minimal aortobiiliac atherosclerotic calcification is present   without evidence of aneurysmal dilatation. LYMPH NODES: No enlarged lymph nodes. GASTROINTESTINAL TRACT: No bowel dilation or wall thickening. The appendix is   visualized in its entirety and is normal.       BONES: No acute or aggressive osseous abnormalities identified. OTHER: There is a small fat-containing umbilical hernia.       _______________               CXR Results  (Last 48 hours)    None          Medications given in the ED-  Medications   sodium chloride 0.9 % bolus infusion 1,000 mL (1,000 mL IntraVENous New Bag 2/20/19 0855)   ketorolac (TORADOL) injection 15 mg (15 mg IntraVENous Given 2/20/19 0856)   famotidine (PF) (PEPCID) injection 20 mg (20 mg IntraVENous Given 2/20/19 0858)   cefTRIAXone (ROCEPHIN) 1 g in sterile water (preservative free) 10 mL IV syringe (1 g IntraVENous Given 2/20/19 0919)         Medical Decision Making   I am the first provider for this patient.     I reviewed the vital signs, available nursing notes, past medical history, past surgical history, family history and social history. Vital Signs-Reviewed the patient's vital signs. Pulse Oximetry Analysis - 100% on RA     Records Reviewed: Nursing Notes and Old Medical Records    Provider Notes (Medical Decision Making): 57 y.o female presenting with right flank pain and vomiting. On exam, she appears uncomfortable but nontoxic with mild tenderness over right flank. Will obtain lab work, including lipase, and a CT scan of abd/pelvis to evaluate for kidney stone. Procedures:  Procedures    ED Course:   7:50 AM Initial assessment performed. The patients presenting problems have been discussed, and they are in agreement with the care plan formulated and outlined with them. I have encouraged them to ask questions as they arise throughout their visit.    9:09 AM UA indicative of UTI. Will give a dose of Rocephin. Patient UA also with hematuria, however CT scan with no definitive bladder or urethral stone. There is also a right adnexal structure, most likely ovarian cyst. Will obtain an US to rule out torsion as it is in the area of the patient's abdominal pain. On reassessment, patient sleeping comfortably. Easily aroused. Discussed CT and UA results. Patient in agreement with US.    10:59 AM: NO evidence of ovarian torsion. Will DC w/ abx rx for UTI. Diagnosis and Disposition       DISCHARGE NOTE:  10:59 AM  Jody Lennon's  results have been reviewed with her. She has been counseled regarding her diagnosis, treatment, and plan. She verbally conveys understanding and agreement of the signs, symptoms, diagnosis, treatment and prognosis and additionally agrees to follow up as discussed. She also agrees with the care-plan and conveys that all of her questions have been answered.   I have also provided discharge instructions for her that include: educational information regarding their diagnosis and treatment, and list of reasons why they would want to return to the ED prior to their follow-up appointment, should her condition change. She has been provided with education for proper emergency department utilization. CLINICAL IMPRESSION:    1. Acute cystitis with hematuria    2. Right ovarian cyst        PLAN:  1. D/C Home  2. Current Discharge Medication List      START taking these medications    Details   cephALEXin (KEFLEX) 500 mg capsule Take 1 Cap by mouth four (4) times daily for 7 days. Qty: 28 Cap, Refills: 0           3. Follow-up Information     Follow up With Specialties Details Why Contact Info    [address], Franck Fox DO Internal Medicine Schedule an appointment as soon as possible for a visit in 3 days For primary care follow up. [address]      Kasi Brantley MD Obstetrics & Gynecology, Gynecology, Obstetrics Schedule an appointment as soon as possible for a visit in 3 days For OB/GYN follow up. [address] [place] [address] and Gynecology [address] [address] [phone]      [facility] EMERGENCY DEPT Emergency Medicine Go to As needed, If symptoms worsen [address] [place] [id number]  [phone]        _______________________________    Attestations: This note is prepared by Malachy Baumgarten, acting as Scribe for General Emre DO. General Emre DO:  The scribe's documentation has been prepared under my direction and personally reviewed by me in its entirety.   I confirm that the note above accurately reflects all work, treatment, procedures, and medical decision making performed by me.  _______________________________

## 2019-02-20 NOTE — ED TRIAGE NOTES
Pt arrives alert and oriented c/o RLQ abd pain starting yesterday, increasing today. Pt states pain radiates around to her R flank. Pt denies vaginal bleeding or dysuria. LMP 11/18, pt reports menopause. Pt has a 22g IV in R hand was given 50 mcg fentanyl and 4 mg zofran in route via EMS.

## 2020-01-20 ENCOUNTER — APPOINTMENT (OUTPATIENT)
Dept: GENERAL RADIOLOGY | Age: 50
End: 2020-01-20
Attending: EMERGENCY MEDICINE
Payer: COMMERCIAL

## 2020-01-20 ENCOUNTER — HOSPITAL ENCOUNTER (EMERGENCY)
Age: 50
Discharge: HOME | End: 2020-01-20
Attending: EMERGENCY MEDICINE
Payer: COMMERCIAL

## 2020-01-20 VITALS
BODY MASS INDEX: 34.99 KG/M2 | HEIGHT: 65 IN | RESPIRATION RATE: 18 BRPM | OXYGEN SATURATION: 99 % | SYSTOLIC BLOOD PRESSURE: 143 MMHG | TEMPERATURE: 100.5 F | HEART RATE: 88 BPM | WEIGHT: 210 LBS | DIASTOLIC BLOOD PRESSURE: 85 MMHG

## 2020-01-20 DIAGNOSIS — J20.9 ACUTE BRONCHITIS, UNSPECIFIED ORGANISM: Primary | ICD-10-CM

## 2020-01-20 LAB
ALBUMIN SERPL-MCNC: 3.6 G/DL (ref 3.4–5)
ALBUMIN/GLOB SERPL: 0.9 {RATIO} (ref 0.8–1.7)
ALP SERPL-CCNC: 78 U/L (ref 45–117)
ALT SERPL-CCNC: 48 U/L (ref 13–56)
ANION GAP SERPL CALC-SCNC: 5 MMOL/L (ref 3–18)
AST SERPL-CCNC: 34 U/L (ref 10–38)
BASOPHILS # BLD: 0 K/UL (ref 0–0.1)
BASOPHILS NFR BLD: 0 % (ref 0–2)
BILIRUB SERPL-MCNC: 0.6 MG/DL (ref 0.2–1)
BUN SERPL-MCNC: 6 MG/DL (ref 7–18)
BUN/CREAT SERPL: 9 (ref 12–20)
CALCIUM SERPL-MCNC: 9 MG/DL (ref 8.5–10.1)
CHLORIDE SERPL-SCNC: 106 MMOL/L (ref 100–111)
CK MB CFR SERPL CALC: 0.6 % (ref 0–4)
CK MB SERPL-MCNC: 1 NG/ML (ref 5–25)
CK SERPL-CCNC: 181 U/L (ref 26–192)
CO2 SERPL-SCNC: 29 MMOL/L (ref 21–32)
CREAT SERPL-MCNC: 0.7 MG/DL (ref 0.6–1.3)
DIFFERENTIAL METHOD BLD: NORMAL
EOSINOPHIL # BLD: 0.1 K/UL (ref 0–0.4)
EOSINOPHIL NFR BLD: 2 % (ref 0–5)
ERYTHROCYTE [DISTWIDTH] IN BLOOD BY AUTOMATED COUNT: 12.7 % (ref 11.6–14.5)
GLOBULIN SER CALC-MCNC: 4 G/DL (ref 2–4)
GLUCOSE SERPL-MCNC: 150 MG/DL (ref 74–99)
HCT VFR BLD AUTO: 36.7 % (ref 35–45)
HGB BLD-MCNC: 12 G/DL (ref 12–16)
LYMPHOCYTES # BLD: 1.6 K/UL (ref 0.9–3.6)
LYMPHOCYTES NFR BLD: 29 % (ref 21–52)
MCH RBC QN AUTO: 28.4 PG (ref 24–34)
MCHC RBC AUTO-ENTMCNC: 32.7 G/DL (ref 31–37)
MCV RBC AUTO: 86.8 FL (ref 74–97)
MONOCYTES # BLD: 0.4 K/UL (ref 0.05–1.2)
MONOCYTES NFR BLD: 8 % (ref 3–10)
NEUTS SEG # BLD: 3.3 K/UL (ref 1.8–8)
NEUTS SEG NFR BLD: 61 % (ref 40–73)
PLATELET # BLD AUTO: 233 K/UL (ref 135–420)
PMV BLD AUTO: 10.4 FL (ref 9.2–11.8)
POTASSIUM SERPL-SCNC: 3.6 MMOL/L (ref 3.5–5.5)
PROT SERPL-MCNC: 7.6 G/DL (ref 6.4–8.2)
RBC # BLD AUTO: 4.23 M/UL (ref 4.2–5.3)
SODIUM SERPL-SCNC: 140 MMOL/L (ref 136–145)
TROPONIN I SERPL-MCNC: <0.02 NG/ML (ref 0–0.04)
WBC # BLD AUTO: 5.4 K/UL (ref 4.6–13.2)

## 2020-01-20 PROCEDURE — 94640 AIRWAY INHALATION TREATMENT: CPT

## 2020-01-20 PROCEDURE — 74011000250 HC RX REV CODE- 250: Performed by: PHYSICIAN ASSISTANT

## 2020-01-20 PROCEDURE — 77030013140 HC MSK NEB VYRM -A

## 2020-01-20 PROCEDURE — 93005 ELECTROCARDIOGRAM TRACING: CPT

## 2020-01-20 PROCEDURE — 71046 X-RAY EXAM CHEST 2 VIEWS: CPT

## 2020-01-20 PROCEDURE — 94762 N-INVAS EAR/PLS OXIMTRY CONT: CPT

## 2020-01-20 PROCEDURE — 99284 EMERGENCY DEPT VISIT MOD MDM: CPT

## 2020-01-20 PROCEDURE — 82550 ASSAY OF CK (CPK): CPT

## 2020-01-20 PROCEDURE — 80053 COMPREHEN METABOLIC PANEL: CPT

## 2020-01-20 PROCEDURE — 85025 COMPLETE CBC W/AUTO DIFF WBC: CPT

## 2020-01-20 RX ORDER — GUAIFENESIN, PSEUDOEPHEDRINE HYDROCHLORIDE 600; 60 MG/1; MG/1
1 TABLET, EXTENDED RELEASE ORAL EVERY 12 HOURS
Qty: 15 TAB | Refills: 0 | Status: SHIPPED | OUTPATIENT
Start: 2020-01-20 | End: 2022-10-21

## 2020-01-20 RX ORDER — BENZONATATE 100 MG/1
100 CAPSULE ORAL
Qty: 30 CAP | Refills: 0 | Status: SHIPPED | OUTPATIENT
Start: 2020-01-20 | End: 2020-01-27

## 2020-01-20 RX ORDER — DOXYCYCLINE HYCLATE 100 MG
100 TABLET ORAL 2 TIMES DAILY
Qty: 20 TAB | Refills: 0 | Status: SHIPPED | OUTPATIENT
Start: 2020-01-20 | End: 2020-01-20

## 2020-01-20 RX ORDER — BENZONATATE 100 MG/1
100 CAPSULE ORAL
Qty: 30 CAP | Refills: 0 | Status: SHIPPED | OUTPATIENT
Start: 2020-01-20 | End: 2020-01-20

## 2020-01-20 RX ORDER — IPRATROPIUM BROMIDE AND ALBUTEROL SULFATE 2.5; .5 MG/3ML; MG/3ML
3 SOLUTION RESPIRATORY (INHALATION) ONCE
Status: COMPLETED | OUTPATIENT
Start: 2020-01-20 | End: 2020-01-20

## 2020-01-20 RX ORDER — GUAIFENESIN, PSEUDOEPHEDRINE HYDROCHLORIDE 600; 60 MG/1; MG/1
1 TABLET, EXTENDED RELEASE ORAL EVERY 12 HOURS
Qty: 15 TAB | Refills: 0 | Status: SHIPPED | OUTPATIENT
Start: 2020-01-20 | End: 2020-01-20

## 2020-01-20 RX ORDER — DOXYCYCLINE HYCLATE 100 MG
100 TABLET ORAL 2 TIMES DAILY
Qty: 20 TAB | Refills: 0 | Status: SHIPPED | OUTPATIENT
Start: 2020-01-20 | End: 2020-01-30

## 2020-01-20 RX ADMIN — IPRATROPIUM BROMIDE AND ALBUTEROL SULFATE 3 ML: .5; 3 SOLUTION RESPIRATORY (INHALATION) at 15:29

## 2020-01-20 NOTE — ED PROVIDER NOTES
EMERGENCY DEPARTMENT HISTORY AND PHYSICAL EXAM    Date: 1/20/2020  Patient Name: Georgia Feliz    History of Presenting Illness     Chief Complaint   Patient presents with    Cough    Chest Pain         History Provided By: Patient    Chief Complaint: Wheezing and chest tightness    Additional History (Context):   3:11 PM  Georgia Feliz is a 54 y.o. female with PMHX of asthma, hypertension, high cholesterol, diabetes who presents to the emergency department C/O wheezing and chest tightness. Associated sxs include shortness of breath. Pt denies dizziness, blurred vision, abdominal pain, and any other sxs or complaints. Patient states this feels like another asthma attack. She has been using her relief. She has been having some fever    PCP: Teresa VILLAREAL, DO    Current Outpatient Medications   Medication Sig Dispense Refill    doxycycline (VIBRA-TABS) 100 mg tablet Take 1 Tab by mouth two (2) times a day for 10 days. 20 Tab 0    benzonatate (TESSALON PERLES) 100 mg capsule Take 1 Cap by mouth three (3) times daily as needed for Cough for up to 7 days. 30 Cap 0    PSEUDOEPHEDRINE-guaiFENesin (MUCINEX D)  mg per tablet Take 1 Tab by mouth every twelve (12) hours. 15 Tab 0    losartan (COZAAR) 50 mg tablet Take 50 mg by mouth daily.  pravastatin (PRAVACHOL) 10 mg tablet Take  by mouth nightly.  SITagliptin (JANUVIA) 25 mg tablet Take 25 mg by mouth daily.  albuterol (VENTOLIN HFA) 90 mcg/actuation inhaler Take 2 Puffs by inhalation every six (6) hours as needed for Wheezing. 1 Inhaler 0    metFORMIN (GLUCOPHAGE) 850 mg tablet Take 1 Tab by mouth two (2) times daily (with meals). 60 Tab 0    Diabetic Supplies, Miscellan. kit 1 Each by Does Not Apply route daily. 1 Kit 0    multivitamin, tx-iron-ca-min (THERA-M W/ IRON) 9 mg iron-400 mcg tab tablet Take 1 Tab by mouth daily.  Formulary substitution for DAILY MULTIVITAMIN         Past History     Past Medical History:  Past Medical History:   Diagnosis Date    Asthma     Diabetes ([facility] [address]     High cholesterol     Hypertension        Past Surgical History:  Past Surgical History:   Procedure Laterality Date    HX GYN      tubal ligation        Family History:  History reviewed. No pertinent family history. Social History:  Social History     Tobacco Use    Smoking status: Former Smoker     Last attempt to quit: 2015     Years since quittin.7    Smokeless tobacco: Never Used   Substance Use Topics    Alcohol use: No    Drug use: No       Allergies:  No Known Allergies      Review of Systems   Review of Systems   Constitutional: Negative for activity change and unexpected weight change. HENT: Negative for congestion and ear pain. Respiratory: Positive for cough, chest tightness, shortness of breath and wheezing. Cardiovascular: Negative for chest pain. Gastrointestinal: Negative for abdominal pain. Genitourinary: Negative for dysuria. Musculoskeletal: Negative for neck pain. Skin: Negative for rash. Neurological: Negative for syncope and headaches. All other systems reviewed and are negative. Physical Exam     Vitals:    20 1448 20 1515   BP: 143/85    Pulse: 88    Resp: 18    Temp: (!) 100.5 °F (38.1 °C)    SpO2: 99% 99%   Weight: 95.3 kg (210 lb)    Height: 5' 5\" (1.651 m)      Physical Exam  Vitals signs and nursing note reviewed. Constitutional:       General: She is not in acute distress. Appearance: She is well-developed. She is not diaphoretic. HENT:      Head: Normocephalic and atraumatic. Right Ear: Tympanic membrane, ear canal and external ear normal.      Left Ear: Tympanic membrane, ear canal and external ear normal.      Nose: Nose normal.      Mouth/Throat:      Pharynx: Uvula midline. No oropharyngeal exudate or posterior oropharyngeal erythema. Eyes:      General:         Right eye: No discharge. Left eye: No discharge.       Pupils: Pupils are equal, round, and reactive to light. Neck:      Musculoskeletal: Full passive range of motion without pain, normal range of motion and neck supple. No neck rigidity. Trachea: Trachea normal.   Cardiovascular:      Rate and Rhythm: Normal rate and regular rhythm. Pulses: Normal pulses. Heart sounds: Normal heart sounds. No murmur. No friction rub. No gallop. Pulmonary:      Effort: Pulmonary effort is normal. No respiratory distress. Breath sounds: Wheezing and rhonchi present. No rales. Comments: Coarse rhonchi on the right with moderate diffuse wheezing  Chest:      Chest wall: No tenderness. Abdominal:      General: Bowel sounds are normal. There is no distension. Palpations: Abdomen is soft. There is no mass. Tenderness: There is no tenderness. There is no guarding or rebound. Musculoskeletal: Normal range of motion. Lymphadenopathy:      Cervical: No cervical adenopathy. Skin:     General: Skin is warm and dry. Findings: No rash. Neurological:      Mental Status: She is alert and oriented to person, place, and time. Psychiatric:         Judgment: Judgment normal.           Diagnostic Study Results     Labs -     Recent Results (from the past 12 hour(s))   CBC WITH AUTOMATED DIFF    Collection Time: 01/20/20  3:10 PM   Result Value Ref Range    WBC 5.4 4.6 - 13.2 K/uL    RBC 4.23 4.20 - 5.30 M/uL    HGB 12.0 12.0 - 16.0 g/dL    HCT 36.7 35.0 - 45.0 %    MCV 86.8 74.0 - 97.0 FL    MCH 28.4 24.0 - 34.0 PG    MCHC 32.7 31.0 - 37.0 g/dL    RDW 12.7 11.6 - 14.5 %    PLATELET 344 802 - 009 K/uL    MPV 10.4 9.2 - 11.8 FL    NEUTROPHILS 61 40 - 73 %    LYMPHOCYTES 29 21 - 52 %    MONOCYTES 8 3 - 10 %    EOSINOPHILS 2 0 - 5 %    BASOPHILS 0 0 - 2 %    ABS. NEUTROPHILS 3.3 1.8 - 8.0 K/UL    ABS. LYMPHOCYTES 1.6 0.9 - 3.6 K/UL    ABS. MONOCYTES 0.4 0.05 - 1.2 K/UL    ABS. EOSINOPHILS 0.1 0.0 - 0.4 K/UL    ABS.  BASOPHILS 0.0 0.0 - 0.1 K/UL    DF AUTOMATED     CARDIAC PANEL,(CK, CKMB & TROPONIN)    Collection Time: 01/20/20  3:10 PM   Result Value Ref Range     26 - 192 U/L    CK - MB 1.0 <3.6 ng/ml    CK-MB Index 0.6 0.0 - 4.0 %    Troponin-I, QT <0.02 0.0 - 7.394 NG/ML   METABOLIC PANEL, COMPREHENSIVE    Collection Time: 01/20/20  3:10 PM   Result Value Ref Range    Sodium 140 136 - 145 mmol/L    Potassium 3.6 3.5 - 5.5 mmol/L    Chloride 106 100 - 111 mmol/L    CO2 29 21 - 32 mmol/L    Anion gap 5 3.0 - 18 mmol/L    Glucose 150 (H) 74 - 99 mg/dL    BUN 6 (L) 7.0 - 18 MG/DL    Creatinine 0.70 0.6 - 1.3 MG/DL    BUN/Creatinine ratio 9 (L) 12 - 20      GFR est AA >60 >60 ml/min/1.73m2    GFR est non-AA >60 >60 ml/min/1.73m2    Calcium 9.0 8.5 - 10.1 MG/DL    Bilirubin, total 0.6 0.2 - 1.0 MG/DL    ALT (SGPT) 48 13 - 56 U/L    AST (SGOT) 34 10 - 38 U/L    Alk. phosphatase 78 45 - 117 U/L    Protein, total 7.6 6.4 - 8.2 g/dL    Albumin 3.6 3.4 - 5.0 g/dL    Globulin 4.0 2.0 - 4.0 g/dL    A-G Ratio 0.9 0.8 - 1.7     EKG, 12 LEAD, INITIAL    Collection Time: 01/20/20  3:12 PM   Result Value Ref Range    Ventricular Rate 81 BPM    Atrial Rate 81 BPM    P-R Interval 166 ms    QRS Duration 98 ms    Q-T Interval 346 ms    QTC Calculation (Bezet) 401 ms    Calculated P Axis 54 degrees    Calculated R Axis 20 degrees    Calculated T Axis 30 degrees    Diagnosis       Normal sinus rhythm  Nonspecific T wave abnormality  Abnormal ECG  When compared with ECG of 19-AUG-2018 17:56,  No significant change was found         Radiologic Studies -   XR CHEST PA LAT   Final Result   IMPRESSION:      No acute radiographic cardiopulmonary abnormality. CT Results  (Last 48 hours)    None        CXR Results  (Last 48 hours)               01/20/20 1522  XR CHEST PA LAT Final result    Impression:  IMPRESSION:       No acute radiographic cardiopulmonary abnormality.        Narrative:  EXAM: XR CHEST PA LAT       CLINICAL INDICATION/HISTORY: Cough with chest pain   -Additional: None COMPARISON: 8/19/2018       TECHNIQUE: PA and lateral views of the chest       _______________       FINDINGS:       HEART AND MEDIASTINUM: Cardiac size and mediastinal contours are within normal   limits       LUNGS AND PLEURAL SPACES: No focal pneumonic consolidation, pneumothorax or   pleural effusion       BONY THORAX AND SOFT TISSUES: No acute osseous abnormality       _______________                 Medications given in the ED-  Medications   albuterol-ipratropium (DUO-NEB) 2.5 MG-0.5 MG/3 ML (3 mL Nebulization Given 1/20/20 1529)         Medical Decision Making   I am the first provider for this patient. I reviewed the vital signs, available nursing notes, past medical history, past surgical history, family history and social history. Vital Signs-Reviewed the patient's vital signs. Pulse Oximetry Analysis - 99% on RA     Cardiac Monitor:  Rate: 84 bpm  Rhythm: NSR    EKG interpretation: (Preliminary)  Normal sinus rhythm at a ventricular rate of 81. Nonspecific T wave abnormality. No STEMI. EKG read by MARYLU Vázquez  at 3:14 PM      Records Reviewed: Nursing Notes and Old Medical Records    Provider Notes (Medical Decision Making): Patient sounds better after neb treatment. Will treat for acute bronchitis. Procedures:  Procedures    ED Course:   3:11 PM Initial assessment performed. The patients presenting problems have been discussed, and they are in agreement with the care plan formulated and outlined with them. I have encouraged them to ask questions as they arise throughout their visit. Diagnosis and Disposition       DISCHARGE NOTE:  4:11 PM   Eliud Lennon's  results have been reviewed with her. She has been counseled regarding her diagnosis, treatment, and plan. She verbally conveys understanding and agreement of the signs, symptoms, diagnosis, treatment and prognosis and additionally agrees to follow up as discussed.   She also agrees with the care-plan and conveys that all of her questions have been answered. I have also provided discharge instructions for her that include: educational information regarding their diagnosis and treatment, and list of reasons why they would want to return to the ED prior to their follow-up appointment, should her condition change. She has been provided with education for proper emergency department utilization. CLINICAL IMPRESSION:    1. Acute bronchitis, unspecified organism        PLAN:  1. D/C Home  2. Current Discharge Medication List      START taking these medications    Details   doxycycline (VIBRA-TABS) 100 mg tablet Take 1 Tab by mouth two (2) times a day for 10 days. Qty: 20 Tab, Refills: 0      benzonatate (TESSALON PERLES) 100 mg capsule Take 1 Cap by mouth three (3) times daily as needed for Cough for up to 7 days. Qty: 30 Cap, Refills: 0      PSEUDOEPHEDRINE-guaiFENesin (MUCINEX D)  mg per tablet Take 1 Tab by mouth every twelve (12) hours. Qty: 15 Tab, Refills: 0           3.    Follow-up Information     Follow up With Specialties Details Why Contact Info    [address], Adilia Miranda DO Internal Medicine Schedule an appointment as soon as possible for a visit in 2 days  [address] [facility] EMERGENCY DEPT Emergency Medicine  If symptoms worsen [address] [place] [id number]  [facility] Schedule an appointment as soon as possible for a visit in 1 day  [facility] EMERGENCY DEPT Emergency Medicine  If symptoms worsen [address] [place] [id number]  [phone]        _______________________________

## 2020-01-20 NOTE — DISCHARGE INSTRUCTIONS
Patient Education        Bronchitis: Care Instructions  Your Care Instructions    Bronchitis is inflammation of the bronchial tubes, which carry air to the lungs. The tubes swell and produce mucus, or phlegm. The mucus and inflamed bronchial tubes make you cough. You may have trouble breathing. Most cases of bronchitis are caused by viruses like those that cause colds. Antibiotics usually do not help and they may be harmful. Bronchitis usually develops rapidly and lasts about 2 to 3 weeks in otherwise healthy people. Follow-up care is a key part of your treatment and safety. Be sure to make and go to all appointments, and call your doctor if you are having problems. It's also a good idea to know your test results and keep a list of the medicines you take. How can you care for yourself at home? · Take all medicines exactly as prescribed. Call your doctor if you think you are having a problem with your medicine. · Get some extra rest.  · Take an over-the-counter pain medicine, such as acetaminophen (Tylenol), ibuprofen (Advil, Motrin), or naproxen (Aleve) to reduce fever and relieve body aches. Read and follow all instructions on the label. · Do not take two or more pain medicines at the same time unless the doctor told you to. Many pain medicines have acetaminophen, which is Tylenol. Too much acetaminophen (Tylenol) can be harmful. · Take an over-the-counter cough medicine that contains dextromethorphan to help quiet a dry, hacking cough so that you can sleep. Avoid cough medicines that have more than one active ingredient. Read and follow all instructions on the label. · Breathe moist air from a humidifier, hot shower, or sink filled with hot water. The heat and moisture will thin mucus so you can cough it out. · Do not smoke. Smoking can make bronchitis worse. If you need help quitting, talk to your doctor about stop-smoking programs and medicines.  These can increase your chances of quitting for good.  When should you call for help? Call 911 anytime you think you may need emergency care. For example, call if:    · You have severe trouble breathing.    Call your doctor now or seek immediate medical care if:    · You have new or worse trouble breathing.     · You cough up dark brown or bloody mucus (sputum).     · You have a new or higher fever.     · You have a new rash.    Watch closely for changes in your health, and be sure to contact your doctor if:    · You cough more deeply or more often, especially if you notice more mucus or a change in the color of your mucus.     · You are not getting better as expected. Where can you learn more? Go to http://nadine-pierce.info/. Enter H333 in the search box to learn more about \"Bronchitis: Care Instructions. \"  Current as of: June 9, 2019  Content Version: 12.2  © 7947-4422 [employer]. Care instructions adapted under license by [employer] (which disclaims liability or warranty for this information). If you have questions about a medical condition or this instruction, always ask your healthcare professional. [address] any warranty or liability for your use of this information.

## 2020-01-20 NOTE — LETTER
[facility] [place] 
[facility] 
[address] [id number] [phone] Work/School Note Date: 1/20/2020 To Whom It May concern: 
 
Yan Starks was seen and treated today in the emergency room by the following provider(s): 
Attending Provider: Samantha Rodriguez MD 
Physician Assistant: MARYLU Grove. Yan Starks may return to work on 1/23/20. Sincerely, MARYLU Colon

## 2020-01-21 LAB
ATRIAL RATE: 81 BPM
CALCULATED P AXIS, ECG09: 54 DEGREES
CALCULATED R AXIS, ECG10: 20 DEGREES
CALCULATED T AXIS, ECG11: 30 DEGREES
DIAGNOSIS, 93000: NORMAL
P-R INTERVAL, ECG05: 166 MS
Q-T INTERVAL, ECG07: 346 MS
QRS DURATION, ECG06: 98 MS
QTC CALCULATION (BEZET), ECG08: 401 MS
VENTRICULAR RATE, ECG03: 81 BPM

## 2021-07-09 ENCOUNTER — HOSPITAL ENCOUNTER (EMERGENCY)
Age: 51
Discharge: HOME | End: 2021-07-10
Attending: EMERGENCY MEDICINE
Payer: COMMERCIAL

## 2021-07-09 VITALS
WEIGHT: 196 LBS | OXYGEN SATURATION: 100 % | DIASTOLIC BLOOD PRESSURE: 89 MMHG | TEMPERATURE: 97.8 F | RESPIRATION RATE: 18 BRPM | BODY MASS INDEX: 33.46 KG/M2 | HEART RATE: 67 BPM | SYSTOLIC BLOOD PRESSURE: 165 MMHG | HEIGHT: 64 IN

## 2021-07-09 DIAGNOSIS — R42 DIZZINESS: Primary | ICD-10-CM

## 2021-07-09 LAB
ALBUMIN SERPL-MCNC: 3.3 G/DL (ref 3.4–5)
ALBUMIN/GLOB SERPL: 0.8 {RATIO} (ref 0.8–1.7)
ALP SERPL-CCNC: 92 U/L (ref 45–117)
ALT SERPL-CCNC: 20 U/L (ref 13–56)
ANION GAP SERPL CALC-SCNC: 6 MMOL/L (ref 3–18)
AST SERPL-CCNC: 11 U/L (ref 10–38)
BASOPHILS # BLD: 0 K/UL (ref 0–0.1)
BASOPHILS NFR BLD: 0 % (ref 0–2)
BILIRUB SERPL-MCNC: 0.6 MG/DL (ref 0.2–1)
BUN SERPL-MCNC: 7 MG/DL (ref 7–18)
BUN/CREAT SERPL: 11 (ref 12–20)
CALCIUM SERPL-MCNC: 9.6 MG/DL (ref 8.5–10.1)
CHLORIDE SERPL-SCNC: 107 MMOL/L (ref 100–111)
CK MB CFR SERPL CALC: NORMAL % (ref 0–4)
CK MB SERPL-MCNC: <1 NG/ML (ref 5–25)
CK SERPL-CCNC: 107 U/L (ref 26–192)
CO2 SERPL-SCNC: 26 MMOL/L (ref 21–32)
CREAT SERPL-MCNC: 0.66 MG/DL (ref 0.6–1.3)
DIFFERENTIAL METHOD BLD: ABNORMAL
EOSINOPHIL # BLD: 0.1 K/UL (ref 0–0.4)
EOSINOPHIL NFR BLD: 1 % (ref 0–5)
ERYTHROCYTE [DISTWIDTH] IN BLOOD BY AUTOMATED COUNT: 12.6 % (ref 11.6–14.5)
GLOBULIN SER CALC-MCNC: 4.1 G/DL (ref 2–4)
GLUCOSE SERPL-MCNC: 271 MG/DL (ref 74–99)
HCT VFR BLD AUTO: 32.7 % (ref 35–45)
HGB BLD-MCNC: 10.9 G/DL (ref 12–16)
LYMPHOCYTES # BLD: 3.2 K/UL (ref 0.9–3.6)
LYMPHOCYTES NFR BLD: 45 % (ref 21–52)
MAGNESIUM SERPL-MCNC: 1.7 MG/DL (ref 1.6–2.6)
MCH RBC QN AUTO: 29.1 PG (ref 24–34)
MCHC RBC AUTO-ENTMCNC: 33.3 G/DL (ref 31–37)
MCV RBC AUTO: 87.2 FL (ref 74–97)
MONOCYTES # BLD: 0.4 K/UL (ref 0.05–1.2)
MONOCYTES NFR BLD: 5 % (ref 3–10)
NEUTS SEG # BLD: 3.4 K/UL (ref 1.8–8)
NEUTS SEG NFR BLD: 48 % (ref 40–73)
PLATELET # BLD AUTO: 307 K/UL (ref 135–420)
PMV BLD AUTO: 10.3 FL (ref 9.2–11.8)
POTASSIUM SERPL-SCNC: 3.6 MMOL/L (ref 3.5–5.5)
PROT SERPL-MCNC: 7.4 G/DL (ref 6.4–8.2)
RBC # BLD AUTO: 3.75 M/UL (ref 4.2–5.3)
SODIUM SERPL-SCNC: 139 MMOL/L (ref 136–145)
TROPONIN I SERPL-MCNC: <0.02 NG/ML (ref 0–0.04)
WBC # BLD AUTO: 7.1 K/UL (ref 4.6–13.2)

## 2021-07-09 PROCEDURE — 96361 HYDRATE IV INFUSION ADD-ON: CPT

## 2021-07-09 PROCEDURE — 82553 CREATINE MB FRACTION: CPT

## 2021-07-09 PROCEDURE — 74011250636 HC RX REV CODE- 250/636: Performed by: EMERGENCY MEDICINE

## 2021-07-09 PROCEDURE — 99284 EMERGENCY DEPT VISIT MOD MDM: CPT

## 2021-07-09 PROCEDURE — 96374 THER/PROPH/DIAG INJ IV PUSH: CPT

## 2021-07-09 PROCEDURE — 80053 COMPREHEN METABOLIC PANEL: CPT

## 2021-07-09 PROCEDURE — 93005 ELECTROCARDIOGRAM TRACING: CPT

## 2021-07-09 PROCEDURE — 85025 COMPLETE CBC W/AUTO DIFF WBC: CPT

## 2021-07-09 PROCEDURE — 83735 ASSAY OF MAGNESIUM: CPT

## 2021-07-09 RX ORDER — ONDANSETRON 2 MG/ML
4 INJECTION INTRAMUSCULAR; INTRAVENOUS
Status: COMPLETED | OUTPATIENT
Start: 2021-07-09 | End: 2021-07-09

## 2021-07-09 RX ADMIN — ONDANSETRON 4 MG: 2 INJECTION INTRAMUSCULAR; INTRAVENOUS at 21:43

## 2021-07-09 RX ADMIN — SODIUM CHLORIDE 1000 ML: 900 INJECTION, SOLUTION INTRAVENOUS at 21:43

## 2021-07-10 ENCOUNTER — APPOINTMENT (OUTPATIENT)
Dept: CT IMAGING | Age: 51
End: 2021-07-10
Attending: EMERGENCY MEDICINE
Payer: COMMERCIAL

## 2021-07-10 PROCEDURE — 70450 CT HEAD/BRAIN W/O DYE: CPT

## 2021-07-10 PROCEDURE — 74011250636 HC RX REV CODE- 250/636: Performed by: EMERGENCY MEDICINE

## 2021-07-10 RX ORDER — BUTALBITAL, ACETAMINOPHEN AND CAFFEINE 300; 40; 50 MG/1; MG/1; MG/1
1 CAPSULE ORAL
Qty: 10 CAPSULE | Refills: 0 | Status: SHIPPED | OUTPATIENT
Start: 2021-07-10 | End: 2022-10-21

## 2021-07-10 RX ORDER — KETOROLAC TROMETHAMINE 30 MG/ML
30 INJECTION, SOLUTION INTRAMUSCULAR; INTRAVENOUS
Status: DISCONTINUED | OUTPATIENT
Start: 2021-07-10 | End: 2021-07-10 | Stop reason: HOSPADM

## 2021-07-10 RX ORDER — MECLIZINE HYDROCHLORIDE 25 MG/1
TABLET ORAL
Qty: 20 TABLET | Refills: 0 | Status: SHIPPED | OUTPATIENT
Start: 2021-07-10 | End: 2022-10-21

## 2021-07-10 RX ORDER — MECLIZINE HCL 12.5 MG 12.5 MG/1
25 TABLET ORAL
Status: COMPLETED | OUTPATIENT
Start: 2021-07-10 | End: 2021-07-10

## 2021-07-10 RX ORDER — AMOXICILLIN AND CLAVULANATE POTASSIUM 875; 125 MG/1; MG/1
1 TABLET, FILM COATED ORAL 2 TIMES DAILY
Qty: 14 TABLET | Refills: 0 | Status: SHIPPED | OUTPATIENT
Start: 2021-07-10 | End: 2022-10-21

## 2021-07-10 RX ORDER — KETOROLAC TROMETHAMINE 10 MG/1
10 TABLET, FILM COATED ORAL
Qty: 12 TABLET | Refills: 0 | Status: SHIPPED | OUTPATIENT
Start: 2021-07-10 | End: 2022-10-21

## 2021-07-10 RX ORDER — ONDANSETRON 4 MG/1
4 TABLET, ORALLY DISINTEGRATING ORAL
Qty: 20 TABLET | Refills: 0 | Status: SHIPPED | OUTPATIENT
Start: 2021-07-10 | End: 2022-10-21

## 2021-07-10 RX ORDER — BUTALBITAL, ACETAMINOPHEN AND CAFFEINE 50; 325; 40 MG/1; MG/1; MG/1
2 TABLET ORAL
Status: DISCONTINUED | OUTPATIENT
Start: 2021-07-10 | End: 2021-07-10 | Stop reason: HOSPADM

## 2021-07-10 RX ADMIN — MECLIZINE 25 MG: 12.5 TABLET ORAL at 00:56

## 2021-07-10 NOTE — ED NOTES
Provider is in Room with pt. Pt is upset, \"because I have been here for hours and no one has done anything for me. \" Provider talking to pt.

## 2021-07-10 NOTE — ED PROVIDER NOTES
EMERGENCY DEPARTMENT HISTORY AND PHYSICAL EXAM      Date: 7/9/2021  Patient Name: Elayne Moss      History of Presenting Illness     Chief Complaint   Patient presents with    Dizziness       History Provided By: Patient    HPI: Elayne Moss, 52 y.o. female with a past medical history significant diabetes, asthma, hypertension, hypercholesterolemia, presents to the ED with cc of dizziness and nausea. Patient states for the last 3 days she has not felt well. She began having dizziness last night. She describes the dizziness is spinning of balance. Dizziness associated with some nausea. Patient denies numbness or tingling or weakness. She denies earache. She has no chest pain or abdomen pain. Initially she denied any headaches but states that she does have pain both temporal areas. There are no other complaints, changes, or physical findings at this time. PCP: Samuel Sharma, DO    Current Outpatient Medications   Medication Sig Dispense Refill    butalbital-acetaminophen-caff (Fioricet) -40 mg per capsule Take 1 Capsule by mouth every six (6) hours as needed for Headache. 10 Capsule 0    meclizine (ANTIVERT) 25 mg tablet Take 1 tablet by mouth every 6 hours for the next 3 days. Then stop taking the meclizine. Restart the meclizine for 3 day intervals if vertigo/ dizziness returns. 20 Tablet 0    ondansetron (Zofran ODT) 4 mg disintegrating tablet 1 Tablet by SubLINGual route every eight (8) hours as needed for Nausea or Vomiting. 20 Tablet 0    amoxicillin-clavulanate (Augmentin) 875-125 mg per tablet Take 1 Tablet by mouth two (2) times a day. 14 Tablet 0    meclizine (ANTIVERT) 25 mg tablet Take 1 tablet by mouth every 6 hours for the next 3 days. Then stop taking the meclizine. Restart the meclizine for 3 day intervals if vertigo/ dizziness returns. 20 Tablet 0    ketorolac (TORADOL) 10 mg tablet Take 1 Tablet by mouth every eight (8) hours as needed for Pain.  12 Tablet 0  PSEUDOEPHEDRINE-guaiFENesin (MUCINEX D)  mg per tablet Take 1 Tab by mouth every twelve (12) hours. 15 Tab 0    losartan (COZAAR) 50 mg tablet Take 50 mg by mouth daily.  pravastatin (PRAVACHOL) 10 mg tablet Take  by mouth nightly.  SITagliptin (JANUVIA) 25 mg tablet Take 25 mg by mouth daily.  albuterol (VENTOLIN HFA) 90 mcg/actuation inhaler Take 2 Puffs by inhalation every six (6) hours as needed for Wheezing. 1 Inhaler 0    metFORMIN (GLUCOPHAGE) 850 mg tablet Take 1 Tab by mouth two (2) times daily (with meals). 60 Tab 0    Diabetic Supplies, Miscellan. kit 1 Each by Does Not Apply route daily. 1 Kit 0    multivitamin, tx-iron-ca-min (THERA-M W/ IRON) 9 mg iron-400 mcg tab tablet Take 1 Tab by mouth daily. Formulary substitution for DAILY MULTIVITAMIN         Past History     Past Medical History:  Past Medical History:   Diagnosis Date    Asthma     Diabetes ([address]     High cholesterol     Hypertension        Past Surgical History:  Past Surgical History:   Procedure Laterality Date    HX GYN      tubal ligation        Family History:  History reviewed. No pertinent family history. Social History:  Social History     Tobacco Use    Smoking status: Former Smoker     Quit date: 2015     Years since quittin.2    Smokeless tobacco: Never Used   Substance Use Topics    Alcohol use: No    Drug use: No       Allergies:  No Known Allergies      Review of Systems     Review of Systems   Constitutional: Negative for chills and fever. HENT: Negative for congestion and sore throat. Respiratory: Negative for cough and shortness of breath. Cardiovascular: Negative for chest pain. Gastrointestinal: Positive for nausea. Negative for abdominal distention and vomiting. Genitourinary: Negative for difficulty urinating, dysuria, flank pain, frequency, hematuria, urgency, vaginal bleeding and vaginal discharge.    Musculoskeletal: Negative for arthralgias and joint swelling. Skin: Negative for rash and wound. Neurological: Positive for dizziness and headaches. Negative for weakness and light-headedness. Hematological: Negative for adenopathy. Physical Exam     Physical Exam  Vitals and nursing note reviewed. Constitutional:       General: She is not in acute distress. Appearance: She is well-developed. She is obese. She is not diaphoretic. Comments: Appears in no acute distress. HENT:      Head: Normocephalic and atraumatic. Jaw: No trismus. Comments: There is some tenderness over the frontal sinuses. No tenderness over the temporal arteries. Right Ear: External ear normal. No swelling or tenderness. Tympanic membrane is not perforated, erythematous or bulging. Left Ear: External ear normal. No swelling or tenderness. Tympanic membrane is not perforated, erythematous or bulging. Nose: Nose normal. No mucosal edema or rhinorrhea. Right Sinus: No maxillary sinus tenderness or frontal sinus tenderness. Left Sinus: No maxillary sinus tenderness or frontal sinus tenderness. Mouth/Throat:      Mouth: No oral lesions. Dentition: No dental abscesses. Pharynx: Uvula midline. No oropharyngeal exudate, posterior oropharyngeal erythema or uvula swelling. Tonsils: No tonsillar abscesses. Eyes:      General: No scleral icterus. Right eye: No discharge. Left eye: No discharge. Conjunctiva/sclera: Conjunctivae normal.      Comments: She has bilateral horizontal nystagmus. Cardiovascular:      Rate and Rhythm: Normal rate and regular rhythm. Heart sounds: Normal heart sounds. No murmur heard. No friction rub. No gallop. Pulmonary:      Effort: Pulmonary effort is normal. No tachypnea, accessory muscle usage or respiratory distress. Breath sounds: Normal breath sounds. No decreased breath sounds, wheezing, rhonchi or rales. Abdominal:      Palpations: Abdomen is soft. Musculoskeletal:         General: No tenderness. Normal range of motion. Cervical back: Normal range of motion and neck supple. Lymphadenopathy:      Cervical: No cervical adenopathy. Skin:     General: Skin is warm and dry. Findings: No erythema or rash. Neurological:      General: No focal deficit present. Mental Status: She is alert and oriented to person, place, and time. Cranial Nerves: No cranial nerve deficit. Sensory: No sensory deficit. Lab and Diagnostic Study Results     Labs -     Recent Results (from the past 12 hour(s))   EKG, 12 LEAD, INITIAL    Collection Time: 07/09/21  9:19 PM   Result Value Ref Range    Ventricular Rate 60 BPM    Atrial Rate 60 BPM    P-R Interval 178 ms    QRS Duration 98 ms    Q-T Interval 408 ms    QTC Calculation (Bezet) 408 ms    Calculated P Axis 39 degrees    Calculated R Axis 20 degrees    Calculated T Axis 11 degrees    Diagnosis       Normal sinus rhythm  Nonspecific T wave abnormality  Abnormal ECG  When compared with ECG of 20-JAN-2020 15:12,  No significant change was found     CBC WITH AUTOMATED DIFF    Collection Time: 07/09/21  9:35 PM   Result Value Ref Range    WBC 7.1 4.6 - 13.2 K/uL    RBC 3.75 (L) 4.20 - 5.30 M/uL    HGB 10.9 (L) 12.0 - 16.0 g/dL    HCT 32.7 (L) 35.0 - 45.0 %    MCV 87.2 74.0 - 97.0 FL    MCH 29.1 24.0 - 34.0 PG    MCHC 33.3 31.0 - 37.0 g/dL    RDW 12.6 11.6 - 14.5 %    PLATELET 109 041 - 713 K/uL    MPV 10.3 9.2 - 11.8 FL    NEUTROPHILS 48 40 - 73 %    LYMPHOCYTES 45 21 - 52 %    MONOCYTES 5 3 - 10 %    EOSINOPHILS 1 0 - 5 %    BASOPHILS 0 0 - 2 %    ABS. NEUTROPHILS 3.4 1.8 - 8.0 K/UL    ABS. LYMPHOCYTES 3.2 0.9 - 3.6 K/UL    ABS. MONOCYTES 0.4 0.05 - 1.2 K/UL    ABS. EOSINOPHILS 0.1 0.0 - 0.4 K/UL    ABS.  BASOPHILS 0.0 0.0 - 0.1 K/UL    DF AUTOMATED     METABOLIC PANEL, COMPREHENSIVE    Collection Time: 07/09/21  9:35 PM   Result Value Ref Range    Sodium 139 136 - 145 mmol/L    Potassium 3.6 3.5 - 5.5 mmol/L    Chloride 107 100 - 111 mmol/L    CO2 26 21 - 32 mmol/L    Anion gap 6 3.0 - 18 mmol/L    Glucose 271 (H) 74 - 99 mg/dL    BUN 7 7.0 - 18 MG/DL    Creatinine 0.66 0.6 - 1.3 MG/DL    BUN/Creatinine ratio 11 (L) 12 - 20      GFR est AA >60 >60 ml/min/1.73m2    GFR est non-AA >60 >60 ml/min/1.73m2    Calcium 9.6 8.5 - 10.1 MG/DL    Bilirubin, total 0.6 0.2 - 1.0 MG/DL    ALT (SGPT) 20 13 - 56 U/L    AST (SGOT) 11 10 - 38 U/L    Alk. phosphatase 92 45 - 117 U/L    Protein, total 7.4 6.4 - 8.2 g/dL    Albumin 3.3 (L) 3.4 - 5.0 g/dL    Globulin 4.1 (H) 2.0 - 4.0 g/dL    A-G Ratio 0.8 0.8 - 1.7     CARDIAC PANEL,(CK, CKMB & TROPONIN)    Collection Time: 07/09/21  9:35 PM   Result Value Ref Range    CK - MB <1.0 <3.6 ng/ml    CK-MB Index  0.0 - 4.0 %     CALCULATION NOT PERFORMED WHEN RESULT IS BELOW LINEAR LIMIT     26 - 192 U/L    Troponin-I, QT <0.02 0.0 - 0.045 NG/ML   MAGNESIUM    Collection Time: 07/09/21  9:35 PM   Result Value Ref Range    Magnesium 1.7 1.6 - 2.6 mg/dL       Radiologic Studies -   [unfilled]  CT Results  (Last 48 hours)               07/10/21 0202  CT HEAD WO CONT Final result    Impression:      Negative CT scan of the head without intracranial hemorrhage, infarct or mass   effect. Narrative:  EXAM: CT head       INDICATION: Headache and dizziness       COMPARISON: 12/30/2017       TECHNIQUE: Axial scanning was performed through the head without intravenous   contrast.  Sagittal and coronal reconstructions were created from the axial   data. One or more dose reduction techniques were used on this CT: automated   exposure control, adjustment of the mAs and/or kVp according to patient size,   and iterative reconstruction techniques. The specific techniques used on this   CT exam have been documented in the patient's electronic medical record.     Digital Imaging and Communications in Medicine (DICOM) format image data are   available to nonaffiliated external healthcare facilities or entities on a   secure, media free, reciprocally searchable basis with patient authorization for   at least a 12-month period after this study. _______________       FINDINGS:       BRAIN AND POSTERIOR FOSSA: Ventricles, cisterns and sulci are within normal   range. No intracranial hemorrhage, mass effect, or midline shift. No areas of   abnormal parenchymal attenuation. EXTRA-AXIAL SPACES AND MENINGES: No extracerebral collections. CALVARIUM: Intact. SINUSES: Visualized portions are clear. OTHER: None. No change.       _______________               CXR Results  (Last 48 hours)    None          Medical Decision Making and ED Course   - I am the first and primary provider for this patient AND AM THE PRIMARY PROVIDER OF RECORD. - I reviewed the vital signs, available nursing notes, past medical history, past surgical history, family history and social history. - Initial assessment performed. The patients presenting problems have been discussed, and the staff are in agreement with the care plan formulated and outlined with them. I have encouraged them to ask questions as they arise throughout their visit. Vital Signs-Reviewed the patient's vital signs. Patient Vitals for the past 12 hrs:   Temp Pulse Resp BP SpO2   07/09/21 2052 97.8 °F (36.6 °C) 67 18 (!) 165/89 100 %       EKG interpretation: (Preliminary): Performed at 21:19, and read at 21:19  Rhythm: normal sinus rhythm; and regular . Rate (approx.): 60; Axis: normal; MD interval: normal; QRS interval: normal ; ST/T wave: non-specific changes;  Other findings: .      Records Reviewed: Nursing Notes, Old Medical Records, Previous Radiology Studies and Previous Laboratory Studies    The patient presents with dizziness with a differential diagnosis of  cardiac dysrhythm, dizziness/vertigo, generalized weakness, GI bleed/hypovolemia, hypertension, labrynthitis, syncope/loss of consciousness, TIA and near syncope    ED Course:     Patient is not orthostatic. He however has significant horizontal nystagmus on exam.  He improved slightly with hydration, Zofran and Toradol. On repeat exam she she however states that she still feels bad and seems to be focusing more on headache and dizziness. She does not have any focal neurological findings. CT obtained because patient was describing with headache she has had. She at some point became upset complaining she was not getting enough attention. Also stating no one is done anything for me when she had been given a liter of fluids pain medications and labs pending. She however calm down when she was given more pain medication including some meclizine. Dizziness seemed to improve with this. Repeat exam revealed some tenderness over the frontal sinuses and patient discharged on Augmentin for suspected sinusitis. Patient strongly urged to follow-up with PCP or to return to ED if symptoms worsening. Provider Notes (Medical Decision Making):               Consultations:       Consultations:         Procedures and Critical Care                         Disposition     Disposition: Condition stable and improved  DC- Adult Discharges: All of the diagnostic tests were reviewed and questions answered. Diagnosis, care plan and treatment options were discussed. The patient understands the instructions and will follow up as directed. The patients results have been reviewed with them. They have been counseled regarding their diagnosis. The patient verbally convey understanding and agreement of the signs, symptoms, diagnosis, treatment and prognosis and additionally agrees to follow up as recommended with their PCP in 24 - 48 hours. They also agree with the care-plan and convey that all of their questions have been answered.   I have also put together some discharge instructions for them that include: 1) educational information regarding their diagnosis, 2) how to care for their diagnosis at home, as well a 3) list of reasons why they would want to return to the ED prior to their follow-up appointment, should their condition change. Diagnosis:   1. Dizziness          Disposition:     Follow-up Information     Follow up With Specialties Details Why Contact Info    Javier Varela, DO Internal Medicine In 1 day  [address] [facility]      [facility]  If symptoms worsen [address] [place] [address]  [phone]          Discharge Medication List as of 7/10/2021  2:53 AM      START taking these medications    Details   butalbital-acetaminophen-caff (Fioricet) -40 mg per capsule Take 1 Capsule by mouth every six (6) hours as needed for Headache., Normal, Disp-10 Capsule, R-0      !! meclizine (ANTIVERT) 25 mg tablet Take 1 tablet by mouth every 6 hours for the next 3 days. Then stop taking the meclizine. Restart the meclizine for 3 day intervals if vertigo/ dizziness returns. , Normal, Disp-20 Tablet, R-0      ondansetron (Zofran ODT) 4 mg disintegrating tablet 1 Tablet by SubLINGual route every eight (8) hours as needed for Nausea or Vomiting., Normal, Disp-20 Tablet, R-0      amoxicillin-clavulanate (Augmentin) 875-125 mg per tablet Take 1 Tablet by mouth two (2) times a day., Normal, Disp-14 Tablet, R-0      !! meclizine (ANTIVERT) 25 mg tablet Take 1 tablet by mouth every 6 hours for the next 3 days. Then stop taking the meclizine. Restart the meclizine for 3 day intervals if vertigo/ dizziness returns. , Normal, Disp-20 Tablet, R-0      ketorolac (TORADOL) 10 mg tablet Take 1 Tablet by mouth every eight (8) hours as needed for Pain., Normal, Disp-12 Tablet, R-0       !! - Potential duplicate medications found. Please discuss with provider.       CONTINUE these medications which have NOT CHANGED    Details   PSEUDOEPHEDRINE-guaiFENesin (MUCINEX D)  mg per tablet Take 1 Tab by mouth every twelve (12) hours. , Normal, Disp-15 Tab, R-0      losartan (COZAAR) 50 mg tablet Take 50 mg by mouth daily. , Historical Med      pravastatin (PRAVACHOL) 10 mg tablet Take  by mouth nightly., Historical Med      SITagliptin (JANUVIA) 25 mg tablet Take 25 mg by mouth daily. , Historical Med      albuterol (VENTOLIN HFA) 90 mcg/actuation inhaler Take 2 Puffs by inhalation every six (6) hours as needed for Wheezing., Print, Disp-1 Inhaler, R-0      metFORMIN (GLUCOPHAGE) 850 mg tablet Take 1 Tab by mouth two (2) times daily (with meals). , Print, Disp-60 Tab, R-0      Diabetic Supplies, Miscellan. kit 1 Each by Does Not Apply route daily. , Print, Disp-1 Kit, R-0      multivitamin, tx-iron-ca-min (THERA-M W/ IRON) 9 mg iron-400 mcg tab tablet Take 1 Tab by mouth daily. Formulary substitution for DAILY MULTIVITAMIN, Historical Med             Remove if not discharged  DISCHARGE PLAN:  1. Cannot display discharge medications since this patient is not currently admitted. 2.   Follow-up Information     Follow up With Specialties Details Why Contact Info    Nicole Mcpherson, DO Internal Medicine In 1 day  [address] [facility]      THE [facility] EMERGENCY DEPT Emergency Medicine  If symptoms worsen [address] [place] [address]  [phone]        3. Return to ED if worse   4. Discharge Medication List as of 7/10/2021  2:53 AM      START taking these medications    Details   butalbital-acetaminophen-caff (Fioricet) -40 mg per capsule Take 1 Capsule by mouth every six (6) hours as needed for Headache., Normal, Disp-10 Capsule, R-0      !! meclizine (ANTIVERT) 25 mg tablet Take 1 tablet by mouth every 6 hours for the next 3 days. Then stop taking the meclizine. Restart the meclizine for 3 day intervals if vertigo/ dizziness returns. , Normal, Disp-20 Tablet, R-0      ondansetron (Zofran ODT) 4 mg disintegrating tablet 1 Tablet by SubLINGual route every eight (8) hours as needed for Nausea or Vomiting., Normal, Disp-20 Tablet, R-0      amoxicillin-clavulanate (Augmentin) 875-125 mg per tablet Take 1 Tablet by mouth two (2) times a day., Normal, Disp-14 Tablet, R-0      !! meclizine (ANTIVERT) 25 mg tablet Take 1 tablet by mouth every 6 hours for the next 3 days. Then stop taking the meclizine. Restart the meclizine for 3 day intervals if vertigo/ dizziness returns. , Normal, Disp-20 Tablet, R-0      ketorolac (TORADOL) 10 mg tablet Take 1 Tablet by mouth every eight (8) hours as needed for Pain., Normal, Disp-12 Tablet, R-0       !! - Potential duplicate medications found. Please discuss with provider. CONTINUE these medications which have NOT CHANGED    Details   PSEUDOEPHEDRINE-guaiFENesin (MUCINEX D)  mg per tablet Take 1 Tab by mouth every twelve (12) hours. , Normal, Disp-15 Tab, R-0      losartan (COZAAR) 50 mg tablet Take 50 mg by mouth daily. , Historical Med      pravastatin (PRAVACHOL) 10 mg tablet Take  by mouth nightly., Historical Med      SITagliptin (JANUVIA) 25 mg tablet Take 25 mg by mouth daily. , Historical Med      albuterol (VENTOLIN HFA) 90 mcg/actuation inhaler Take 2 Puffs by inhalation every six (6) hours as needed for Wheezing., Print, Disp-1 Inhaler, R-0      metFORMIN (GLUCOPHAGE) 850 mg tablet Take 1 Tab by mouth two (2) times daily (with meals). , Print, Disp-60 Tab, R-0      Diabetic Supplies, Miscellan. kit 1 Each by Does Not Apply route daily. , Print, Disp-1 Kit, R-0      multivitamin, tx-iron-ca-min (THERA-M W/ IRON) 9 mg iron-400 mcg tab tablet Take 1 Tab by mouth daily. Formulary substitution for DAILY MULTIVITAMIN, Historical Med             Diagnosis     Clinical Impression:   1. Dizziness        Attestations:    Chris Herman MD    Please note that this dictation was completed with [employer] ([employer] the [employer] voice recognition software.   Quite often unanticipated grammatical, syntax, homophones, and other interpretive errors are inadvertently transcribed by the computer software. Please disregard these errors. Please excuse any errors that have escaped final proofreading. Thank you.

## 2021-07-12 LAB
ATRIAL RATE: 60 BPM
CALCULATED P AXIS, ECG09: 39 DEGREES
CALCULATED R AXIS, ECG10: 20 DEGREES
CALCULATED T AXIS, ECG11: 11 DEGREES
DIAGNOSIS, 93000: NORMAL
P-R INTERVAL, ECG05: 178 MS
Q-T INTERVAL, ECG07: 408 MS
QRS DURATION, ECG06: 98 MS
QTC CALCULATION (BEZET), ECG08: 408 MS
VENTRICULAR RATE, ECG03: 60 BPM

## 2022-10-16 PROCEDURE — 96374 THER/PROPH/DIAG INJ IV PUSH: CPT

## 2022-10-16 PROCEDURE — 96376 TX/PRO/DX INJ SAME DRUG ADON: CPT

## 2022-10-16 PROCEDURE — 51702 INSERT TEMP BLADDER CATH: CPT

## 2022-10-16 PROCEDURE — 99285 EMERGENCY DEPT VISIT HI MDM: CPT

## 2022-10-17 ENCOUNTER — APPOINTMENT (OUTPATIENT)
Dept: GENERAL RADIOLOGY | Age: 52
DRG: 824 | End: 2022-10-17
Attending: STUDENT IN AN ORGANIZED HEALTH CARE EDUCATION/TRAINING PROGRAM
Payer: COMMERCIAL

## 2022-10-17 ENCOUNTER — APPOINTMENT (OUTPATIENT)
Dept: CT IMAGING | Age: 52
DRG: 824 | End: 2022-10-17
Attending: PHYSICIAN ASSISTANT
Payer: COMMERCIAL

## 2022-10-17 ENCOUNTER — HOSPITAL ENCOUNTER (INPATIENT)
Age: 52
LOS: 4 days | Discharge: HOME | DRG: 824 | End: 2022-10-21
Attending: EMERGENCY MEDICINE | Admitting: HOSPITALIST
Payer: COMMERCIAL

## 2022-10-17 DIAGNOSIS — R77.8 ELEVATED TROPONIN: ICD-10-CM

## 2022-10-17 DIAGNOSIS — M89.9 LYTIC LESION OF BONE ON X-RAY: ICD-10-CM

## 2022-10-17 DIAGNOSIS — E83.52 HYPERCALCEMIA: ICD-10-CM

## 2022-10-17 DIAGNOSIS — N17.9 AKI (ACUTE KIDNEY INJURY) (HCC): Primary | ICD-10-CM

## 2022-10-17 LAB
25(OH)D3 SERPL-MCNC: 48.7 NG/ML (ref 30–100)
ALBUMIN SERPL-MCNC: 2.5 G/DL (ref 3.4–5)
ALBUMIN SERPL-MCNC: 2.5 G/DL (ref 3.4–5)
ALBUMIN/GLOB SERPL: 0.2 {RATIO} (ref 0.8–1.7)
ALP SERPL-CCNC: 53 U/L (ref 45–117)
ALT SERPL-CCNC: 47 U/L (ref 13–56)
ANION GAP SERPL CALC-SCNC: 6 MMOL/L (ref 3–18)
ANION GAP SERPL CALC-SCNC: 6 MMOL/L (ref 3–18)
ANION GAP SERPL CALC-SCNC: 7 MMOL/L (ref 3–18)
APPEARANCE UR: ABNORMAL
AST SERPL-CCNC: 40 U/L (ref 10–38)
BACTERIA URNS QL MICRO: ABNORMAL /HPF
BASOPHILS # BLD: 0 K/UL (ref 0–0.1)
BASOPHILS NFR BLD: 0 % (ref 0–2)
BILIRUB SERPL-MCNC: 0.8 MG/DL (ref 0.2–1)
BILIRUB UR QL: NEGATIVE
BUN SERPL-MCNC: 23 MG/DL (ref 7–18)
BUN SERPL-MCNC: 35 MG/DL (ref 7–18)
BUN SERPL-MCNC: 35 MG/DL (ref 7–18)
BUN/CREAT SERPL: 10 (ref 12–20)
BUN/CREAT SERPL: 11 (ref 12–20)
BUN/CREAT SERPL: 13 (ref 12–20)
CA-I SERPL-SCNC: 1.04 MMOL/L (ref 1.12–1.32)
CALCIUM SERPL-MCNC: 11.1 MG/DL (ref 8.5–10.1)
CALCIUM SERPL-MCNC: 13.7 MG/DL (ref 8.5–10.1)
CALCIUM SERPL-MCNC: 14.3 MG/DL (ref 8.5–10.1)
CHLORIDE SERPL-SCNC: 104 MMOL/L (ref 100–111)
CHLORIDE SERPL-SCNC: 96 MMOL/L (ref 100–111)
CHLORIDE SERPL-SCNC: 97 MMOL/L (ref 100–111)
CHLORIDE UR-SCNC: 105 MMOL/L (ref 55–125)
CO2 SERPL-SCNC: 22 MMOL/L (ref 21–32)
CO2 SERPL-SCNC: 28 MMOL/L (ref 21–32)
CO2 SERPL-SCNC: 29 MMOL/L (ref 21–32)
COLOR UR: YELLOW
CREAT SERPL-MCNC: 1.78 MG/DL (ref 0.6–1.3)
CREAT SERPL-MCNC: 3.2 MG/DL (ref 0.6–1.3)
CREAT SERPL-MCNC: 3.49 MG/DL (ref 0.6–1.3)
CREAT UR-MCNC: 70 MG/DL (ref 30–125)
DIFFERENTIAL METHOD BLD: ABNORMAL
EOSINOPHIL # BLD: 0 K/UL (ref 0–0.4)
EOSINOPHIL NFR BLD: 0 % (ref 0–5)
EPITH CASTS URNS QL MICRO: ABNORMAL /LPF (ref 0–5)
ERYTHROCYTE [DISTWIDTH] IN BLOOD BY AUTOMATED COUNT: 15.4 % (ref 11.6–14.5)
EST. AVERAGE GLUCOSE BLD GHB EST-MCNC: 163 MG/DL
FERRITIN SERPL-MCNC: 522 NG/ML (ref 8–388)
FOLATE SERPL-MCNC: 8.4 NG/ML (ref 3.1–17.5)
GLOBULIN SER CALC-MCNC: 12 G/DL (ref 2–4)
GLUCOSE BLD STRIP.AUTO-MCNC: 107 MG/DL (ref 70–110)
GLUCOSE BLD STRIP.AUTO-MCNC: 109 MG/DL (ref 70–110)
GLUCOSE BLD STRIP.AUTO-MCNC: 119 MG/DL (ref 70–110)
GLUCOSE BLD STRIP.AUTO-MCNC: 97 MG/DL (ref 70–110)
GLUCOSE SERPL-MCNC: 108 MG/DL (ref 74–99)
GLUCOSE SERPL-MCNC: 137 MG/DL (ref 74–99)
GLUCOSE SERPL-MCNC: 94 MG/DL (ref 74–99)
GLUCOSE UR STRIP.AUTO-MCNC: NEGATIVE MG/DL
GRAN CASTS URNS QL MICRO: ABNORMAL /LPF
HBA1C MFR BLD: 7.3 % (ref 4.2–5.6)
HCT VFR BLD AUTO: 24.5 % (ref 35–45)
HGB BLD-MCNC: 8 G/DL (ref 12–16)
HGB UR QL STRIP: ABNORMAL
IMM GRANULOCYTES # BLD AUTO: 0 K/UL
IMM GRANULOCYTES NFR BLD AUTO: 0 %
IRON SATN MFR SERPL: 51 % (ref 20–50)
IRON SERPL-MCNC: 100 UG/DL (ref 50–175)
KETONES UR QL STRIP.AUTO: ABNORMAL MG/DL
LDH SERPL L TO P-CCNC: 167 U/L (ref 81–234)
LEUKOCYTE ESTERASE UR QL STRIP.AUTO: NEGATIVE
LIPASE SERPL-CCNC: 105 U/L (ref 73–393)
LYMPHOCYTES # BLD: 7.2 K/UL (ref 0.9–3.6)
LYMPHOCYTES NFR BLD: 66 % (ref 21–52)
MCH RBC QN AUTO: 29.1 PG (ref 24–34)
MCHC RBC AUTO-ENTMCNC: 32.7 G/DL (ref 31–37)
MCV RBC AUTO: 89.1 FL (ref 78–100)
MONOCYTES # BLD: 0.5 K/UL (ref 0.05–1.2)
MONOCYTES NFR BLD: 5 % (ref 3–10)
NEUTS SEG # BLD: 3.1 K/UL (ref 1.8–8)
NEUTS SEG NFR BLD: 29 % (ref 40–73)
NITRITE UR QL STRIP.AUTO: NEGATIVE
NRBC # BLD: 0 K/UL (ref 0–0.01)
NRBC BLD-RTO: 0 PER 100 WBC
PH UR STRIP: 5 [PH] (ref 5–8)
PLATELET # BLD AUTO: 282 K/UL (ref 135–420)
PLATELET COMMENTS,PCOM: ABNORMAL
PMV BLD AUTO: 10.4 FL (ref 9.2–11.8)
POTASSIUM SERPL-SCNC: 3.9 MMOL/L (ref 3.5–5.5)
POTASSIUM SERPL-SCNC: 4.5 MMOL/L (ref 3.5–5.5)
POTASSIUM SERPL-SCNC: 4.5 MMOL/L (ref 3.5–5.5)
PROT SERPL-MCNC: 14.5 G/DL (ref 6.4–8.2)
PROT UR STRIP-MCNC: 300 MG/DL
PROT UR-MCNC: 64 MG/DL
PROT/CREAT UR-RTO: 0.9
RBC # BLD AUTO: 2.75 M/UL (ref 4.2–5.3)
RBC #/AREA URNS HPF: ABNORMAL /HPF (ref 0–5)
RBC MORPH BLD: ABNORMAL
SODIUM SERPL-SCNC: 131 MMOL/L (ref 136–145)
SODIUM SERPL-SCNC: 132 MMOL/L (ref 136–145)
SODIUM SERPL-SCNC: 132 MMOL/L (ref 136–145)
SODIUM UR-SCNC: 136 MMOL/L (ref 20–110)
SP GR UR REFRACTOMETRY: 1.02 (ref 1–1.03)
TIBC SERPL-MCNC: 196 UG/DL (ref 250–450)
TROPONIN-HIGH SENSITIVITY: 65 NG/L (ref 0–54)
TSH SERPL DL<=0.05 MIU/L-ACNC: 6.57 UIU/ML (ref 0.36–3.74)
URATE SERPL-MCNC: 9.5 MG/DL (ref 2.6–7.2)
UROBILINOGEN UR QL STRIP.AUTO: 0.2 EU/DL (ref 0.2–1)
VIT B12 SERPL-MCNC: 305 PG/ML (ref 211–911)
WBC # BLD AUTO: 10.8 K/UL (ref 4.6–13.2)
WBC URNS QL MICRO: ABNORMAL /HPF (ref 0–5)

## 2022-10-17 PROCEDURE — 82607 VITAMIN B-12: CPT

## 2022-10-17 PROCEDURE — P9047 ALBUMIN (HUMAN), 25%, 50ML: HCPCS | Performed by: HOSPITALIST

## 2022-10-17 PROCEDURE — 74011250636 HC RX REV CODE- 250/636: Performed by: HOSPITALIST

## 2022-10-17 PROCEDURE — 84156 ASSAY OF PROTEIN URINE: CPT

## 2022-10-17 PROCEDURE — 82728 ASSAY OF FERRITIN: CPT

## 2022-10-17 PROCEDURE — 84443 ASSAY THYROID STIM HORMONE: CPT

## 2022-10-17 PROCEDURE — 74011250636 HC RX REV CODE- 250/636: Performed by: EMERGENCY MEDICINE

## 2022-10-17 PROCEDURE — 82306 VITAMIN D 25 HYDROXY: CPT

## 2022-10-17 PROCEDURE — 80048 BASIC METABOLIC PNL TOTAL CA: CPT

## 2022-10-17 PROCEDURE — 82962 GLUCOSE BLOOD TEST: CPT

## 2022-10-17 PROCEDURE — 74011250636 HC RX REV CODE- 250/636: Performed by: STUDENT IN AN ORGANIZED HEALTH CARE EDUCATION/TRAINING PROGRAM

## 2022-10-17 PROCEDURE — 65270000029 HC RM PRIVATE

## 2022-10-17 PROCEDURE — 84300 ASSAY OF URINE SODIUM: CPT

## 2022-10-17 PROCEDURE — 36415 COLL VENOUS BLD VENIPUNCTURE: CPT

## 2022-10-17 PROCEDURE — 80053 COMPREHEN METABOLIC PANEL: CPT

## 2022-10-17 PROCEDURE — 77075 RADEX OSSEOUS SURVEY COMPL: CPT

## 2022-10-17 PROCEDURE — 85025 COMPLETE CBC W/AUTO DIFF WBC: CPT

## 2022-10-17 PROCEDURE — 74011250637 HC RX REV CODE- 250/637: Performed by: HOSPITALIST

## 2022-10-17 PROCEDURE — 82040 ASSAY OF SERUM ALBUMIN: CPT

## 2022-10-17 PROCEDURE — 83615 LACTATE (LD) (LDH) ENZYME: CPT

## 2022-10-17 PROCEDURE — 74011250636 HC RX REV CODE- 250/636: Performed by: PHYSICIAN ASSISTANT

## 2022-10-17 PROCEDURE — 83036 HEMOGLOBIN GLYCOSYLATED A1C: CPT

## 2022-10-17 PROCEDURE — 83540 ASSAY OF IRON: CPT

## 2022-10-17 PROCEDURE — 74176 CT ABD & PELVIS W/O CONTRAST: CPT

## 2022-10-17 PROCEDURE — 84550 ASSAY OF BLOOD/URIC ACID: CPT

## 2022-10-17 PROCEDURE — 93005 ELECTROCARDIOGRAM TRACING: CPT

## 2022-10-17 PROCEDURE — 82436 ASSAY OF URINE CHLORIDE: CPT

## 2022-10-17 PROCEDURE — 83690 ASSAY OF LIPASE: CPT

## 2022-10-17 PROCEDURE — 82330 ASSAY OF CALCIUM: CPT

## 2022-10-17 PROCEDURE — 83010 ASSAY OF HAPTOGLOBIN QUANT: CPT

## 2022-10-17 PROCEDURE — 81001 URINALYSIS AUTO W/SCOPE: CPT

## 2022-10-17 PROCEDURE — 84484 ASSAY OF TROPONIN QUANT: CPT

## 2022-10-17 RX ORDER — INSULIN LISPRO 100 [IU]/ML
INJECTION, SOLUTION INTRAVENOUS; SUBCUTANEOUS
Status: DISCONTINUED | OUTPATIENT
Start: 2022-10-17 | End: 2022-10-21 | Stop reason: HOSPADM

## 2022-10-17 RX ORDER — ACETAMINOPHEN 325 MG/1
650 TABLET ORAL
Status: DISCONTINUED | OUTPATIENT
Start: 2022-10-17 | End: 2022-10-21 | Stop reason: HOSPADM

## 2022-10-17 RX ORDER — DEXTROSE MONOHYDRATE 100 MG/ML
0-250 INJECTION, SOLUTION INTRAVENOUS AS NEEDED
Status: DISCONTINUED | OUTPATIENT
Start: 2022-10-17 | End: 2022-10-21 | Stop reason: HOSPADM

## 2022-10-17 RX ORDER — ONDANSETRON 2 MG/ML
4 INJECTION INTRAMUSCULAR; INTRAVENOUS
Status: DISCONTINUED | OUTPATIENT
Start: 2022-10-17 | End: 2022-10-21 | Stop reason: HOSPADM

## 2022-10-17 RX ORDER — SITAGLIPTIN AND METFORMIN HYDROCHLORIDE 50; 1000 MG/1; MG/1
1 TABLET, FILM COATED ORAL 2 TIMES DAILY WITH MEALS
COMMUNITY

## 2022-10-17 RX ORDER — MAGNESIUM SULFATE 100 %
4 CRYSTALS MISCELLANEOUS AS NEEDED
Status: DISCONTINUED | OUTPATIENT
Start: 2022-10-17 | End: 2022-10-21 | Stop reason: HOSPADM

## 2022-10-17 RX ORDER — ONDANSETRON 2 MG/ML
4 INJECTION INTRAMUSCULAR; INTRAVENOUS
Status: COMPLETED | OUTPATIENT
Start: 2022-10-17 | End: 2022-10-17

## 2022-10-17 RX ORDER — CALCITONIN SALMON 200 [USP'U]/ML
4 INJECTION, SOLUTION INTRAMUSCULAR; SUBCUTANEOUS ONCE
Status: COMPLETED | OUTPATIENT
Start: 2022-10-17 | End: 2022-10-17

## 2022-10-17 RX ORDER — PRAVASTATIN SODIUM 20 MG/1
10 TABLET ORAL
Status: DISCONTINUED | OUTPATIENT
Start: 2022-10-17 | End: 2022-10-21 | Stop reason: HOSPADM

## 2022-10-17 RX ORDER — SODIUM CHLORIDE 9 MG/ML
500 INJECTION, SOLUTION INTRAVENOUS ONCE
Status: COMPLETED | OUTPATIENT
Start: 2022-10-17 | End: 2022-10-17

## 2022-10-17 RX ORDER — ALBUMIN HUMAN 250 G/1000ML
12.5 SOLUTION INTRAVENOUS EVERY 6 HOURS
Status: DISPENSED | OUTPATIENT
Start: 2022-10-17 | End: 2022-10-18

## 2022-10-17 RX ORDER — POLYETHYLENE GLYCOL 3350 17 G/17G
17 POWDER, FOR SOLUTION ORAL DAILY
Status: DISCONTINUED | OUTPATIENT
Start: 2022-10-17 | End: 2022-10-18

## 2022-10-17 RX ORDER — SULFAMETHOXAZOLE AND TRIMETHOPRIM 800; 160 MG/1; MG/1
1 TABLET ORAL 2 TIMES DAILY
COMMUNITY
End: 2022-10-21

## 2022-10-17 RX ORDER — SODIUM CHLORIDE 9 MG/ML
50 INJECTION, SOLUTION INTRAVENOUS CONTINUOUS
Status: DISCONTINUED | OUTPATIENT
Start: 2022-10-17 | End: 2022-10-18

## 2022-10-17 RX ORDER — LOSARTAN POTASSIUM 50 MG/1
50 TABLET ORAL DAILY
Status: DISCONTINUED | OUTPATIENT
Start: 2022-10-17 | End: 2022-10-17

## 2022-10-17 RX ADMIN — ACETAMINOPHEN 650 MG: 325 TABLET, FILM COATED ORAL at 15:20

## 2022-10-17 RX ADMIN — ONDANSETRON 4 MG: 2 INJECTION INTRAMUSCULAR; INTRAVENOUS at 01:02

## 2022-10-17 RX ADMIN — ONDANSETRON 4 MG: 2 INJECTION INTRAMUSCULAR; INTRAVENOUS at 07:04

## 2022-10-17 RX ADMIN — ALBUMIN (HUMAN) 12.5 G: 0.25 INJECTION, SOLUTION INTRAVENOUS at 20:03

## 2022-10-17 RX ADMIN — SODIUM CHLORIDE 150 ML/HR: 9 INJECTION, SOLUTION INTRAVENOUS at 23:28

## 2022-10-17 RX ADMIN — SODIUM CHLORIDE 1000 ML: 9 INJECTION, SOLUTION INTRAVENOUS at 01:02

## 2022-10-17 RX ADMIN — ALBUMIN (HUMAN) 12.5 G: 0.25 INJECTION, SOLUTION INTRAVENOUS at 13:57

## 2022-10-17 RX ADMIN — ONDANSETRON 4 MG: 2 INJECTION INTRAMUSCULAR; INTRAVENOUS at 15:20

## 2022-10-17 RX ADMIN — SODIUM CHLORIDE 150 ML/HR: 9 INJECTION, SOLUTION INTRAVENOUS at 10:22

## 2022-10-17 RX ADMIN — ACETAMINOPHEN 650 MG: 325 TABLET, FILM COATED ORAL at 21:31

## 2022-10-17 RX ADMIN — PRAVASTATIN SODIUM 10 MG: 20 TABLET ORAL at 21:31

## 2022-10-17 RX ADMIN — SODIUM CHLORIDE 150 ML/HR: 9 INJECTION, SOLUTION INTRAVENOUS at 17:18

## 2022-10-17 RX ADMIN — CALCITONIN SALMON 360 INT'L UNITS: 200 INJECTION, SOLUTION INTRAMUSCULAR; SUBCUTANEOUS at 11:05

## 2022-10-17 RX ADMIN — SODIUM CHLORIDE 500 ML: 9 INJECTION, SOLUTION INTRAVENOUS at 10:35

## 2022-10-17 NOTE — CONSULTS
RENAL CONSULT  10/17/2022    Patient:  Gladys Adkins  :  1970  Gender:  female  MRN #:  [id number]    Reason for Consult: Hypercalcemia    History of Present Illness:    Gladys Adkins is a 50y.o. year old female with ongoing Hypertension, Type 2 diabetes mellitus presented in ED with nausea/vomiting for last 2 days . Reportedly she had colonoscopy 2 days ago. She was treated  with bactrim for UTI diagnosed 5 days ago , was feeling little better   But on 10/16 again she had nausea, vomiting and unable to keep food/liquid down so she came in ED     In ED:   she was found to have hypercalcemia of 14.3 mg/dl which improved to 13.7 mg/dl   Creatinine is above 3 mg/dl , Anion gap is 6, she received normal saline 1000 liter    When I saw her she denied having kidney disease in the past , but for last few days she did not have good appetite, nausea and vomiting , not drinking water  Admits drop in urine output . Denied taking NSAIDS and herbal supplement            Past Medical History:   Diagnosis Date    Asthma     Diabetes ([facility] [address]     High cholesterol     Hypertension      Past Surgical History:   Procedure Laterality Date    HX GYN      tubal ligation      No family history on file.   No Known Allergies  Current Facility-Administered Medications   Medication Dose Route Frequency Provider Last Rate Last Admin    0.9% sodium chloride infusion  100 mL/hr IntraVENous CONTINUOUS Jessica Harris MD        polyethylene glycol (MIRALAX) packet 17 g  17 g Oral DAILY Jessica Harris MD        ondansetron [facility]) injection 4 mg  4 mg IntraVENous Q6H PRN Jessica Harris MD        insulin lispro (HUMALOG) injection   SubCUTAneous AC&HS Jessica Harris MD        glucose chewable tablet 16 g  4 Tablet Oral PRN Jessica Harris MD        glucagon (GLUCAGEN) injection 1 mg  1 mg IntraMUSCular PRN Jessica Harris MD        dextrose 10% infusion 0-250 mL  0-250 mL IntraVENous PRN Jessica Harris MD        losartan (COZAAR) tablet 50 mg 50 mg Oral DAILY Keiry Perea MD        multivitamin, tx-iron-ca-min (THERA-M w/ IRON) tablet 1 Tablet  1 Tablet Oral DAILY Keiry Perea MD        pravastatin (PRAVACHOL) tablet 10 mg  10 mg Oral QHS Keiry Perea MD               Review of Symptoms:  positive in bold     Consitutional Symptoms: No fever, weight loss, weight gain and has fatigue  Eyes:- No change in vision , no itching   Ears, Nose, Mouth, Throat:  No neck pain , swelling ,hearing loss and nose bleed. Pulmonary: No cough and shortness of breath . CVS: No  Chest pain , palpitation and orthopnea  GI: has nausea and vomiting   - decreased urine output   Neurological:No dizzy ness, syncope, focal weakness and numbness . Skin : No rash and erythema   Endocrine: No feeling of excessive cold or warmth, hot flushes  Psychiatric: Denied feeling depressed    Objective:    Visit Vitals  BP (!) 146/78 (BP 1 Location: Left upper arm, BP Patient Position: At rest)   Pulse 65   Temp 98.2 °F (36.8 °C)   Resp 18   Wt 89.8 kg (198 lb)   SpO2 97%   BMI 33.99 kg/m²       Physical Exam:    Pt awake,  alert and comfortable, ill looking , dry   HEENT: No JVD, anicteric sclera, no neck swelling  Lung: clear to auscultation, no crackles and wheeze  Heart: s1s2 regular no rubs or murmur  Abdomen: soft, non tender, no guarding, normal bowel sounds.   Ext: no edema and pulsation intact  Skin: No rash and erythema  CNS- Oriented to time , place and person     Laboratory Data:    Lab Results   Component Value Date    BUN 35 (H) 10/17/2022    BUN 35 (H) 10/17/2022    BUN 7 07/09/2021     (L) 10/17/2022     (L) 10/17/2022     07/09/2021    CO2 29 10/17/2022    CO2 28 10/17/2022    CO2 26 07/09/2021     Lab Results   Component Value Date    WBC 10.8 10/17/2022    HGB 8.0 (L) 10/17/2022    HCT 24.5 (L) 10/17/2022     No components found for: CALCIUM, PHOSPHORUS, MAGNESIUM  No results found for: HDL  No results found for: SPECIMENTYP, TURBIDITY, UGLU    Imaging Reveiwed:    CT abdomen:pelvis- New diffuse marrow heterogeneity with new large lytic lesion within the left  iliac bone (approximately 7 cm) with cortical destruction both medially and  laterally. Potentially metastatic disease versus myeloma, is there a history of  malignancy? Left iliac lesion would be amenable to percutaneous biopsy as  necessary. 3. Enlarged leiomyomatous appearance of the uterus with speckled calcification. Simple appearing right adnexal cyst measures approximately 58 mm in maximal  diameter, it is noted to be mobile, now posterior to the uterus, was previously  lateral to the uterus. No free pelvic fluid. Assessment:    Harpreet Knight is a 50y.o. year old female with ongoing Hypertension, Type 2 diabetes mellitus presented in ED with nausea/vomiting for last 2 days .   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   CT scan of abdomen and pelvis has lytic lesion in bone suspicious of Multiple myeloma    She has significantly elevated calcium , without correction to low albumin its is 13.7 mg/dl   Which could be from myeloma but other possibilities are not excluded     Acute Renal failure  Hypercalcemia  Hypertension  Dehydration   Anemia       Plan:      Hypercalcemia-   She already received normal saline 1 liter, will give 500 ml bolus now and increase drip to 150 cc/hour  Given renal failure she needs very close monitoring of urine output and respiratory status while on IVF, if urine output does not  need to cut down IV infusion   Will avoid bisphosphonate due to renal failure  Calcitonin 4 unit/kg one dose   serum calcium every 8 hours  Tele monitoring   Will consider dialysis only if medical management fails to correct hypercalcemia  SPEP/KARLA, vitamin D level, PTH, PTH related peptide  Consult oncology     Acute renal failure: -   Causes as above  Continue volume infusion   Chavis's catheter for strict urine output recording at least for next 24 hours   Intake and output  Avoid NSAIDS , contrast and nephrotoxin  Dose all meds for current eGFR  As of now no indication of dialysis   Will check urine electrolytes and UPC    Hypertension:   Hold losartan  Consider amlodipine for hypertension    Anemia work up                I have reviewed patient's record for pertinent labs, radiology and other test . I have reviewed old records. I have ordered labs, medications and radiology as necessary and indicated per patient's condition . Total time spent is approximately 35 minutes. Greater than 50 % of that time was spent in counseling and or care coordination.          Sonia Winters MD, MD  [facility].- Nephrology

## 2022-10-17 NOTE — ROUTINE PROCESS
TRANSFER - IN REPORT:    Verbal report received from Pratibha Hernandez RN(name) on [facility]  being received from ER(unit) for routine progression of care      Report consisted of patients Situation, Background, Assessment and   Recommendations(SBAR). Information from the following report(s) SBAR, Kardex, Intake/Output, and Recent Results was reviewed with the receiving nurse. Opportunity for questions and clarification was provided. Assessment completed upon patients arrival to unit and care assumed.          2

## 2022-10-17 NOTE — ED TRIAGE NOTES
C/o having a colonoscopy on monday and since then having nausea and vomiting. Pt went wednesday to pcp and had blood drawn and urine and was dx with a uti and given bactrim. Pt states she hasn't been able to keep anything down since Monday.

## 2022-10-17 NOTE — ED PROVIDER NOTES
EMERGENCY DEPARTMENT HISTORY AND PHYSICAL EXAM    Date: 10/17/2022  Patient Name: Wayne Castorena    History of Presenting Illness     Chief Complaint   Patient presents with    Abdominal Pain    Vomiting         History Provided By: Patient    12:51 AM  Wayne Castorena is a 50 y.o. female with PMHX of hypertension, hyperlipidemia, Type2 diabetes, asthma who presents to the emergency department C/O nausea vomiting which began 2 days ago. Patient underwent colonoscopy 7 days ago, had 2 polyps removed, no complications. The following 2 days she did not feel \"well,\" and developed some lower abdominal pain. Saw her PCP 5 days ago, was diagnosed with a UTI and started on Bactrim. She states she started feeling a little better for a day or 2 but then to days ago became more nauseous and started vomiting and now states she cannot keep anything down. She overall feels weak and fatigued with mild suprapubic discomfort. She states her blood sugar was 200 upon arrival to ED. Pt denies chest pain, shortness of breath, diarrhea, constipation, dysuria, fever, and any other sxs or complaints. PCP: Zoran Fernandez, DO    Current Outpatient Medications   Medication Sig Dispense Refill    butalbital-acetaminophen-caff (Fioricet) -40 mg per capsule Take 1 Capsule by mouth every six (6) hours as needed for Headache. 10 Capsule 0    meclizine (ANTIVERT) 25 mg tablet Take 1 tablet by mouth every 6 hours for the next 3 days. Then stop taking the meclizine. Restart the meclizine for 3 day intervals if vertigo/ dizziness returns. 20 Tablet 0    ondansetron (Zofran ODT) 4 mg disintegrating tablet 1 Tablet by SubLINGual route every eight (8) hours as needed for Nausea or Vomiting. 20 Tablet 0    amoxicillin-clavulanate (Augmentin) 875-125 mg per tablet Take 1 Tablet by mouth two (2) times a day. 14 Tablet 0    meclizine (ANTIVERT) 25 mg tablet Take 1 tablet by mouth every 6 hours for the next 3 days.   Then stop taking the meclizine. Restart the meclizine for 3 day intervals if vertigo/ dizziness returns. 20 Tablet 0    ketorolac (TORADOL) 10 mg tablet Take 1 Tablet by mouth every eight (8) hours as needed for Pain. 12 Tablet 0    PSEUDOEPHEDRINE-guaiFENesin (MUCINEX D)  mg per tablet Take 1 Tab by mouth every twelve (12) hours. 15 Tab 0    losartan (COZAAR) 50 mg tablet Take 50 mg by mouth daily. pravastatin (PRAVACHOL) 10 mg tablet Take  by mouth nightly. SITagliptin (JANUVIA) 25 mg tablet Take 25 mg by mouth daily. albuterol (VENTOLIN HFA) 90 mcg/actuation inhaler Take 2 Puffs by inhalation every six (6) hours as needed for Wheezing. 1 Inhaler 0    metFORMIN (GLUCOPHAGE) 850 mg tablet Take 1 Tab by mouth two (2) times daily (with meals). 60 Tab 0    Diabetic Supplies, Miscellan. kit 1 Each by Does Not Apply route daily. 1 Kit 0    multivitamin, tx-iron-ca-min (THERA-M W/ IRON) 9 mg iron-400 mcg tab tablet Take 1 Tab by mouth daily. Formulary substitution for DAILY MULTIVITAMIN         Past History     Past Medical History:  Past Medical History:   Diagnosis Date    Asthma     Diabetes ([facility] [address]     High cholesterol     Hypertension        Past Surgical History:  Past Surgical History:   Procedure Laterality Date    HX GYN      tubal ligation        Family History:  No family history on file. Social History:  Social History     Tobacco Use    Smoking status: Former     Types: Cigarettes     Quit date: 2015     Years since quittin.5    Smokeless tobacco: Never   Substance Use Topics    Alcohol use: No    Drug use: No       Allergies:  No Known Allergies      Review of Systems   Review of Systems   Constitutional: Negative. Negative for fever. Respiratory:  Negative for cough and shortness of breath. Cardiovascular:  Negative for chest pain. Gastrointestinal:  Positive for abdominal pain, nausea and vomiting. Negative for diarrhea. Genitourinary:  Negative for dysuria.    All other systems reviewed and are negative. Physical Exam     Vitals:    10/17/22 0026   BP: (!) 147/89   Pulse: 65   Resp: 16   Temp: 97.6 °F (36.4 °C)   SpO2: 100%   Weight: 89.8 kg (198 lb)     Physical Exam    Vital signs and nursing notes reviewed. CONSTITUTIONAL: Alert. Slightly ill but not toxic-appearing; well-nourished; appears uncomfortable, but not in distress. HEAD: Normocephalic; atraumatic. CV: Normal S1, S2; no murmurs, rubs, or gallops. No chest wall tenderness. RESPIRATORY: Normal chest excursion with respiration; breath sounds clear and equal bilaterally; no wheezes, rhonchi, or rales. GI: Normal bowel sounds; non-distended; mild suprapubic tenderness; no guarding or rigidity; no palpable organomegaly. No CVA tenderness. EXT: Normal ROM in all four extremities; non-tender to palpation. No edema or calf tenderness  SKIN: Normal for age and race; warm; dry; good turgor; no apparent lesions or exudate. NEURO: A & O x3. Cranial nerves 2-12 intact. Motor 5/5 bilaterally. Sensation intact. PSYCH:  Mood and affect appropriate. Diagnostic Study Results     Labs -     Recent Results (from the past 12 hour(s))   METABOLIC PANEL, COMPREHENSIVE    Collection Time: 10/17/22  1:00 AM   Result Value Ref Range    Sodium 131 (L) 136 - 145 mmol/L    Potassium 4.5 3.5 - 5.5 mmol/L    Chloride 96 (L) 100 - 111 mmol/L    CO2 28 21 - 32 mmol/L    Anion gap 7 3.0 - 18 mmol/L    Glucose 137 (H) 74 - 99 mg/dL    BUN 35 (H) 7.0 - 18 MG/DL    Creatinine 3.49 (H) 0.6 - 1.3 MG/DL    BUN/Creatinine ratio 10 (L) 12 - 20      eGFR 15 (L) >60 ml/min/1.73m2    Calcium 14.3 (HH) 8.5 - 10.1 MG/DL    Bilirubin, total 0.8 0.2 - 1.0 MG/DL    ALT (SGPT) 47 13 - 56 U/L    AST (SGOT) 40 (H) 10 - 38 U/L    Alk.  phosphatase 53 45 - 117 U/L    Protein, total 14.5 (H) 6.4 - 8.2 g/dL    Albumin 2.5 (L) 3.4 - 5.0 g/dL    Globulin 12.0 (H) 2.0 - 4.0 g/dL    A-G Ratio 0.2 (L) 0.8 - 1.7     TROPONIN-HIGH SENSITIVITY    Collection Time: 10/17/22  1:00 AM   Result Value Ref Range    Troponin-High Sensitivity 65 (H) 0 - 54 ng/L   LIPASE    Collection Time: 10/17/22  1:00 AM   Result Value Ref Range    Lipase 105 73 - 393 U/L       Radiologic Studies -   CT ABD PELV W CONT    (Results Pending)   CT ABD PELV WO CONT (Final result)  Result time 10/17/22 03:10:02  Procedure changed from CT ABD PELV W CONT  Final result by Kevon Man MD (10/17/22 03:10:02)                Impression:      1. No acute pathology within the abdomen or pelvis. 2. New diffuse marrow heterogeneity with new large lytic lesion within the left   iliac bone (approximately 7 cm) with cortical destruction both medially and   laterally. Potentially metastatic disease versus myeloma, is there a history of   malignancy? Left iliac lesion would be amenable to percutaneous biopsy as   necessary. 3. Enlarged leiomyomatous appearance of the uterus with speckled calcification. Simple appearing right adnexal cyst measures approximately 58 mm in maximal   diameter, it is noted to be mobile, now posterior to the uterus, was previously   lateral to the uterus. No free pelvic fluid. 4. Hepatic steatosis. Thin-walled overdistended gallbladder. Narrative:    EXAM: CT of the abdomen and pelvis     INDICATION: Abdominal pain with nausea and vomiting. COMPARISON: February 20, 2019     TECHNIQUE: Axial CT imaging of the abdomen and pelvis was performed without   intravenous contrast. Multiplanar reformats were generated. One or more dose   reduction techniques were used on this CT: automated exposure control,   adjustment of the mAs and/or kVp according to patient size, and iterative   reconstruction techniques. The specific techniques used on this CT exam have   been documented in the patient's electronic medical record.  Digital Imaging and   Communications in the Medicine (DICOM) format image data are available to   nonaffiliated external healthcare facilities or entities on a secure, media   free, reciprocally searchable basis with patient authorization for at least a 12   month period after this study. _______________     FINDINGS:     LOWER CHEST: Unremarkable. LIVER, BILIARY: Mild hepatic low attenuation without focal abnormality. No   biliary dilation. Thin-walled overdistended gallbladder. PANCREAS: Unremarkable. SPLEEN: Unremarkable. ADRENALS: Unremarkable. KIDNEYS: Nothing acute. No renal obstructive changes. Small renal cyst on the   right. LYMPH NODES: No enlarged lymph nodes. GASTROINTESTINAL TRACT: No bowel dilation or wall thickening. Unremarkable   appendix. PELVIC ORGANS: Enlarged leiomyomatous appearance of the uterus with speckled   calcification. Simple appearing right adnexal cyst measures approximately 58 mm   in maximal diameter, it is noted to be mobile, now posterior to the uterus, was   previously lateral to the uterus. No free pelvic fluid. VASCULATURE: Unremarkable. BONES: New diffuse marrow heterogeneity with new large lytic lesion within the   left iliac bone (approximately 7 cm) with cortical destruction both medially and   laterally. OTHER: None. CT Results  (Last 48 hours)      None          CXR Results  (Last 48 hours)      None            Medications given in the ED-  Medications   ondansetron (ZOFRAN) injection 4 mg (4 mg IntraVENous Given 10/17/22 0102)   sodium chloride 0.9 % bolus infusion 1,000 mL (1,000 mL IntraVENous New Bag 10/17/22 0102)         Medical Decision Making   I am the first provider for this patient. I reviewed the vital signs, available nursing notes, past medical history, past surgical history, family history and social history. Vital Signs-Reviewed the patient's vital signs.     Pulse Oximetry Analysis - 100% on RA     EKG interpretation: (Preliminary)  Normal sinus rhythm, rate 69, no STEMI    Records Reviewed: Nursing Notes and Old Medical Records      Procedures:  Procedures    ED Course:  12:51 AM   Initial assessment performed. The patients presenting problems have been discussed, and they are in agreement with the care plan formulated and outlined with them. I have encouraged them to ask questions as they arise throughout their visit. Provider Notes (Medical Decision Making): Qing Wiley is a 50 y.o. female presented with mild suprapubic abdominal pain and nausea and vomiting for the last couple days. She underwent colonoscopy presumably uncomplicated with removal of 2 polyps 1 week ago. Also had been taking Bactrim for the last few days for UTI. Today he appears unwell but not toxic and vitals are stable. She has minimal suprapubic tenderness on exam.  So far her labs reveal SHIVAM with creatinine of 3.49, calcium of 14.3 and slightly elevated troponin of 65, which is most likely related to her poor renal function as her EKG is without ischemic changes and symptoms not convincing for ACS. Plan to trend troponin, awaiting CT abdomen pelvis to rule out intra-abdominal process, continue IVFs as her SHIVAM is presumed to be prerenal.    2:10 AM  Care will be transferred from [facility] Luevano to ED attending Dr. Bolivar Richards to await CT abdomen pelvis and plan for admission. 5:30 AM  Progress note  Case was briefly discussed with nighttime hospitalist.  There are host of patient's to be admitted for which Ms. Trice Hatch is hemodynamically stable. CONSULT NOTE:   7:37 PM  Jeremy Noguera MD   spoke with Dr. Severo Pinna  Specialty: Hospitalist  Discussed pt's hx, disposition, and available diagnostic and imaging results  over the telephone. Reviewed care plans. Consulting physician agrees with plans as outlined. Agrees with care and admission. I will refer the case with oncology and she will cover nephrology. Patient admitted to the medical service. Cachorro Diaz NOTE:   8:08 PM  Jeremy Noguera MD   spoke with Dr. Chavez Round  Specialty: Oncology  Discussed pt's hx, disposition, and available diagnostic and imaging results  over the telephone. Reviewed care plans. Consulting physician agrees with plans as outlined. Patient will be seen on rounds today for definitive care for her hypercalcemia underlying malignancy. .      Diagnosis and Disposition            CLINICAL IMPRESSION:    1. SHIVAM (acute kidney injury) ([facility] [address]    2. Hypercalcemia    3. Elevated troponin        PLAN:  1. Admitted to the medical service  2. Oncology service is consulted for hypercalcemia of malignancy    ________________________      Please note that this dictation was completed with [employer], the computer voice recognition software. Quite often unanticipated grammatical, syntax, homophones, and other interpretive errors are inadvertently transcribed by the computer software. Please disregard these errors. Please excuse any errors that have escaped final proofreading.

## 2022-10-17 NOTE — H&P
[facility] [place]  HISTORY AND PHYSICAL    Name:  Johny Sandoval  MR#:   [id number]  :  1970  ACCOUNT #:  [de-identified]  ADMIT DATE:  10/17/2022    ADMISSION DIAGNOSES:  1. Hypercalcemia. 2.  Ileum lesion. 3.  Nausea and vomiting with abdominal pain. 4.  Diabetes mellitus. 5.  Acute renal failure. 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 She has not eaten really anything in 3 days. She had a colonoscopy 7 days ago with two polyps removed. No complications noted. She noted that couple of days after the procedure, she started having lower abdominal pain. She had seen her PCP after that and was diagnosed with an UTI and started on Bactrim, but ultimately was feeling a little better but then again over this weekend, had worsening abdominal pain, nausea and vomiting and feeling very weak. The patient was evaluated here. She was noted to have hypercalcemia and there is a suspicious lesion on her ileum by CT scan. PCP is Dr. Delroy Benavidez. PAST MEDICAL HISTORY:  Includes diabetes, asthma, high cholesterol, high blood pressure. MEDICATIONS:  Meds for home,  1. Pravachol. 2.  Januvia. 3.  Glucophage. 4.  Multivitamin. PREVIOUS SURGERIES:  Tubal ligation. FAMILY HISTORY:  She does not know of any malignancies in first-degree relatives. SOCIAL HISTORY:  She quit smoking, she states about 2 months ago. She smoked on and off for a number of years. Denies any drug or alcohol use. 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 ALLERGIES:  SHE HAS NO MEDICATION ALLERGIES. REVIEW OF SYSTEMS:  CONSTITUTIONAL:  She has no fevers or chills, no current myalgias. RESPIRATORY:  No cough or shortness of breath. CARDIOVASCULAR:  No chest pain, palpitations or ankle swelling.   GI:  She has had generalized abdominal discomfort, pain, nausea, vomiting, unable to keep anything down. No p.o. intake orally for 3 days. No diarrhea or blood in the stool or hemoptysis. GENITOURINARY:  She denies any dysuria or hematuria. PHYSICAL EXAMINATION:  GENERAL:   female, in no acute distress, oriented x3. Looks clinically dehydrated and weak but not toxic. VITAL SIGNS:  Temperature is 98.2, pulse 65, blood pressure 146/78, respiratory rate is 18, SaO2 is 97% on room air. HEENT:  Sclerae anicteric. Conjunctivae pale. Oropharynx is dry. NECK:  Supple. No thyromegaly or lymphadenopathy. LUNGS:  Clear bilaterally. No rales, rhonchi or wheezes. CARDIAC:  Regular rate and rhythm. No murmur, rub or gallop. ABDOMEN:  Soft. No rebound or guarding. Diminished bowel sounds. No significant tenderness noted. No mass or ascites. LOWER EXTREMITIES:  No clubbing, cyanosis or edema. Distal pulses are palpable. LABORATORY DATA:  Labs showed blood sugar of 107, ionized calcium 1.04. Her white blood cell count 10.8, H and H 8 and 24.5, platelet count is 846. Her urinalysis shows large blood, no nitrites or leukocyte esterase, there was 1+ bacteria. Her chemistry shows an initial calcium of 14.3, on repeat 13.7. Creatinine is 3.20. Albumin is 2.5. In 2021, her creatinine was 0.66. DIAGNOSTIC DATA:  CT of abdomen and pelvis done without contrast showed no acute pathology within the abdomen or pelvis, but a new diffuse marrow heterogeneity with new large lytic lesion within the left iliac bone with cortical destruction, possibly metastatic disease versus myeloma. There is an enlarged lipomatous appearance of the uterus. No pelvic free fluid and a thin-walled over distended gallbladder. The patient labs looked at from other facilities to include a chemistry on 10/10/2022 where her creatinine was 0.82 and within normal limits. ASSESSMENT:  1. Hypocalcemia.   2.  Acute lytic lesion of the ileum on the left side. 3.  Acute renal failure. 4.  Hypertension. 5.  Diabetes. 6.  Clinical volume depletion and dehydration. PLAN:  Accu-Cheks a.c. and at bedtime. We will continue her Pravachol at nighttime, but otherwise hold her medications at home that include an ARB which could contribute to acute kidney injury with volume depletion. She needs generous volume resuscitation at this point. We need to get Nephrology to see her about the hypercalcemia. She needs a further bone survey consult with Hematology/Oncology as well and track her labs daily to see what her calcium is doing. She may need bisphosphonates here, but ultimately in the very interim here, she certainly needs volume repletion and hydration. I am going to order a diabetic diet for her if she is able to tolerate that. I am going to order a TSH. She needs a protein electrophoresis study, and her sodium is slightly low at 132. Her albumin is low at 2.5. I am going to order four doses of albumin additionally for volume expansion. We will monitor closely on the medical unit with appropriate consultations called to specialist and further determination of her acute medical issues to pending further test result.     Anticipated length of stay at least 3 days in the hospital.      Franny Sibley MD      [id number]  D:  10/17/2022 9:56  T:  10/17/2022 11:13  JOB #:  [id number]  CC:  Emilia Bar DO

## 2022-10-17 NOTE — CONSULTS
Hematology / Oncology Initial Consult Note    Admit Date: 10/17/2022    Reason for Consult: Concern for Malignancy    Requesting Physician: Dr. Jeevan Ibanez    Assessment:     Solange Bond is a 50 y.o., BLACK/, female, who I have been asked to see for hypercalcemia and lytic lesion. Principal Problem:    Hypercalcemia of malignancy (10/17/2022)    Active Problems:    Type II diabetes mellitus ([facility] [place] [address] (4/13/2017)      Abdominal pain/Hypercalcemia: Labs with Ca 14.3, on IVF. Causes include paraneoplastic disease vs Multiple myeloma. Avoid bisphosphonate due to SHIVAM, but can consider pomidronate if IVF is not enough. Will send PTH, PTHrP, and myeloma labs  Concern for MM: large lytic lesion in iliac bone, anemia, SHIVAM, and large globulin gap suggestive of active myeloma. Will send myeloma labs as above, order bone survey, and will discuss bone marrow biopsy. Normocytic Anemia: will send anemia labs. Transfuse to maintain Hg>7  SHIVAM: followed by Nephrology    Plan:     - Send PTH, PHrP, Multiple Myeloma Panel, B2 microglobulin, LDH  - Bone Survey and discuss Bone Marrow Biopsy  - Transfuse to maintain Hg>7  - Will continue to follow    Katerina Crawford MD  [address] [facility] [phone]      History of Present Illness: Solange Bond is a 50 y.o. female with Diabetes, Hypertension, Hypercholesterolemia who presented to the ER with nausea x2-3 days. History obtained per chart review as patient was off epps. She noted having a colonoscopy a week ago with two polyps removed. After the procedure, she noted increasing abdominal pain that diminished her appetite. Labs in the ED were significant for CA 14.3, Cr 3.49, Hg 8, and CT A/P with a large lytic lesion in the left iliac bone measuring 7cm with heterogenous appearing bone. Nephrology was consulted, was given IVF, and calcitonin. She denied any prior history of kidney dysfunction and Ca noted to improve after IVF.      Past Medical History:   Diagnosis Date    Asthma     Diabetes ([facility] [address]     High cholesterol     Hypertension        Past Surgical History:   Procedure Laterality Date    HX GYN      tubal ligation        No family history on file. Social History     Socioeconomic History    Marital status: SINGLE   Tobacco Use    Smoking status: Former     Types: Cigarettes     Quit date: 2015     Years since quittin.5    Smokeless tobacco: Never   Substance and Sexual Activity    Alcohol use: No    Drug use: No       Current Facility-Administered Medications   Medication Dose Route Frequency    0.9% sodium chloride infusion  150 mL/hr IntraVENous CONTINUOUS    [Held by provider] polyethylene glycol (MIRALAX) packet 17 g  17 g Oral DAILY    ondansetron (ZOFRAN) injection 4 mg  4 mg IntraVENous Q6H PRN    insulin lispro (HUMALOG) injection   SubCUTAneous AC&HS    glucose chewable tablet 16 g  4 Tablet Oral PRN    glucagon (GLUCAGEN) injection 1 mg  1 mg IntraMUSCular PRN    dextrose 10% infusion 0-250 mL  0-250 mL IntraVENous PRN    [Held by provider] multivitamin, tx-iron-ca-min (THERA-M w/ IRON) tablet 1 Tablet  1 Tablet Oral DAILY    pravastatin (PRAVACHOL) tablet 10 mg  10 mg Oral QHS    albumin human 25% (BUMINATE) solution 12.5 g  12.5 g IntraVENous Q6H    acetaminophen (TYLENOL) tablet 650 mg  650 mg Oral Q6H PRN       Prior to Admission medications    Medication Sig Start Date End Date Taking? Authorizing Provider   SITagliptin-metFORMIN (Janumet) 50-1,000 mg per tablet Take 1 Tablet by mouth two (2) times daily (with meals). Yes Provider, Historical   trimethoprim-sulfamethoxazole (Bactrim DS) 160-800 mg per tablet Take 1 Tablet by mouth two (2) times a day. Yes Provider, Historical   losartan (COZAAR) 50 mg tablet Take 50 mg by mouth daily. Yes Other, MD Sharon   metFORMIN (GLUCOPHAGE) 850 mg tablet Take 1 Tab by mouth two (2) times daily (with meals).  17  Yes Diana Roth MD   Diabetic Supplies, Miscellan. kit 1 Each by Does Not Apply route daily. 4/14/17  Yes Jaclyn Sanchez MD   multivitamin, tx-iron-ca-min (THERA-M W/ IRON) 9 mg iron-400 mcg tab tablet Take 1 Tab by mouth daily. Formulary substitution for DAILY MULTIVITAMIN   Yes Provider, Historical   butalbital-acetaminophen-caff (Fioricet) -40 mg per capsule Take 1 Capsule by mouth every six (6) hours as needed for Headache. Patient not taking: Reported on 10/17/2022 7/10/21   Moriah Richard MD   meclizine (ANTIVERT) 25 mg tablet Take 1 tablet by mouth every 6 hours for the next 3 days. Then stop taking the meclizine. Restart the meclizine for 3 day intervals if vertigo/ dizziness returns. Patient not taking: Reported on 10/17/2022 7/10/21   Moriah Richard MD   ondansetron (Zofran ODT) 4 mg disintegrating tablet 1 Tablet by SubLINGual route every eight (8) hours as needed for Nausea or Vomiting. Patient not taking: Reported on 10/17/2022 7/10/21   Moriah Richard MD   amoxicillin-clavulanate (Augmentin) 875-125 mg per tablet Take 1 Tablet by mouth two (2) times a day. Patient not taking: Reported on 10/17/2022 7/10/21   Moriah Richard MD   meclizine (ANTIVERT) 25 mg tablet Take 1 tablet by mouth every 6 hours for the next 3 days. Then stop taking the meclizine. Restart the meclizine for 3 day intervals if vertigo/ dizziness returns. Patient not taking: Reported on 10/17/2022 7/10/21   Moriah Richard MD   ketorolac (TORADOL) 10 mg tablet Take 1 Tablet by mouth every eight (8) hours as needed for Pain. Patient not taking: Reported on 10/17/2022 7/10/21   Moriah Richard MD   PSEUDOEPHEDRINE-guaiFENesin [facility] D)  mg per tablet Take 1 Tab by mouth every twelve (12) hours. Patient not taking: Reported on 10/17/2022 1/20/20   MARYLU Loera   pravastatin (PRAVACHOL) 10 mg tablet Take  by mouth nightly.   Patient not taking: Reported on 10/17/2022    Other, MD Sharon   SITagliptin (JANUVIA) 25 mg tablet Take 25 mg by mouth daily. Patient not taking: Reported on 10/17/2022    Other, MD Sharon   albuterol (VENTOLIN HFA) 90 mcg/actuation inhaler Take 2 Puffs by inhalation every six (6) hours as needed for Wheezing. 17   Saira Rob MD       No Known Allergies      Physical Exam:    Temp (24hrs), Av °F (36.7 °C), Min:97.6 °F (36.4 °C), Max:98.3 °F (36.8 °C)    Not seen as off epps    Imaging:  CT A/P:     1. No acute pathology within the abdomen or pelvis. 2. New diffuse marrow heterogeneity with new large lytic lesion within the left  iliac bone (approximately 7 cm) with cortical destruction both medially and  laterally. Potentially metastatic disease versus myeloma, is there a history of  malignancy? Left iliac lesion would be amenable to percutaneous biopsy as  necessary. 3. Enlarged leiomyomatous appearance of the uterus with speckled calcification. Simple appearing right adnexal cyst measures approximately 58 mm in maximal  diameter, it is noted to be mobile, now posterior to the uterus, was previously  lateral to the uterus. No free pelvic fluid. 4. Hepatic steatosis. Thin-walled overdistended gallbladder.

## 2022-10-17 NOTE — ED PROVIDER NOTES
61-year-old female high blood pressure diabetes asthma with recent colonoscopy now on antibiotics for UTI using Bactrim. She comes in now with worsening abdominal pain. She has generalized malaise. Previous blood work shows elevated calcium and low albumin. Repeat shows the same. Her corrected calcium is 14.9 but interestingly her ionized calcium is low at 1.04. She also has acute renal failure.

## 2022-10-17 NOTE — ROUTINE PROCESS
Bedside shift change report given to [address] [place], [place] [address], RN (oncoming nurse) by Iam Ocampo RN   (offgoing nurse). Report included the following information SBAR, Kardex, Intake/Output, and Recent Results.

## 2022-10-18 PROBLEM — M89.9 LYTIC BONE LESIONS ON XRAY: Status: ACTIVE | Noted: 2022-10-18

## 2022-10-18 PROBLEM — N17.9 AKI (ACUTE KIDNEY INJURY) (HCC): Status: ACTIVE | Noted: 2022-10-18

## 2022-10-18 LAB
ALBUMIN SERPL-MCNC: 2.3 G/DL (ref 3.4–5)
ALBUMIN/GLOB SERPL: 0.2 {RATIO} (ref 0.8–1.7)
ALP SERPL-CCNC: 40 U/L (ref 45–117)
ALT SERPL-CCNC: 35 U/L (ref 13–56)
ANION GAP SERPL CALC-SCNC: 5 MMOL/L (ref 3–18)
AST SERPL-CCNC: 25 U/L (ref 10–38)
BASOPHILS # BLD: 0 K/UL (ref 0–0.1)
BASOPHILS NFR BLD: 0 % (ref 0–2)
BILIRUB SERPL-MCNC: 0.8 MG/DL (ref 0.2–1)
BUN SERPL-MCNC: 20 MG/DL (ref 7–18)
BUN/CREAT SERPL: 12 (ref 12–20)
CALCIUM SERPL-MCNC: 10.7 MG/DL (ref 8.5–10.1)
CALCIUM SERPL-MCNC: 10.8 MG/DL (ref 8.5–10.1)
CALCIUM SERPL-MCNC: 11 MG/DL (ref 8.5–10.1)
CALCIUM SERPL-MCNC: 11.3 MG/DL (ref 8.5–10.1)
CHLORIDE SERPL-SCNC: 105 MMOL/L (ref 100–111)
CO2 SERPL-SCNC: 25 MMOL/L (ref 21–32)
CREAT SERPL-MCNC: 1.68 MG/DL (ref 0.6–1.3)
DIFFERENTIAL METHOD BLD: ABNORMAL
EOSINOPHIL # BLD: 0.2 K/UL (ref 0–0.4)
EOSINOPHIL NFR BLD: 2 % (ref 0–5)
ERYTHROCYTE [DISTWIDTH] IN BLOOD BY AUTOMATED COUNT: 14.9 % (ref 11.6–14.5)
GLOBULIN SER CALC-MCNC: 9.5 G/DL (ref 2–4)
GLUCOSE BLD STRIP.AUTO-MCNC: 108 MG/DL (ref 70–110)
GLUCOSE BLD STRIP.AUTO-MCNC: 118 MG/DL (ref 70–110)
GLUCOSE BLD STRIP.AUTO-MCNC: 133 MG/DL (ref 70–110)
GLUCOSE BLD STRIP.AUTO-MCNC: 89 MG/DL (ref 70–110)
GLUCOSE SERPL-MCNC: 87 MG/DL (ref 74–99)
HAPTOGLOB SERPL-MCNC: 215 MG/DL (ref 30–200)
HCT VFR BLD AUTO: 21.4 % (ref 35–45)
HCT VFR BLD AUTO: 25.6 % (ref 35–45)
HGB BLD-MCNC: 6.8 G/DL (ref 12–16)
HGB BLD-MCNC: 8.4 G/DL (ref 12–16)
HISTORY CHECKED?,CKHIST: NORMAL
IMM GRANULOCYTES # BLD AUTO: 0 K/UL
IMM GRANULOCYTES NFR BLD AUTO: 0 %
LYMPHOCYTES # BLD: 7.6 K/UL (ref 0.9–3.6)
LYMPHOCYTES NFR BLD: 77 % (ref 21–52)
MCH RBC QN AUTO: 29.1 PG (ref 24–34)
MCHC RBC AUTO-ENTMCNC: 31.8 G/DL (ref 31–37)
MCV RBC AUTO: 91.5 FL (ref 78–100)
MONOCYTES # BLD: 0.1 K/UL (ref 0.05–1.2)
MONOCYTES NFR BLD: 1 % (ref 3–10)
NEUTS SEG # BLD: 2 K/UL (ref 1.8–8)
NEUTS SEG NFR BLD: 20 % (ref 40–73)
NRBC # BLD: 0 K/UL (ref 0–0.01)
NRBC BLD-RTO: 0 PER 100 WBC
PLATELET # BLD AUTO: 200 K/UL (ref 135–420)
PLATELET COMMENTS,PCOM: ABNORMAL
PMV BLD AUTO: 9.4 FL (ref 9.2–11.8)
POTASSIUM SERPL-SCNC: 4 MMOL/L (ref 3.5–5.5)
PROT SERPL-MCNC: 11.8 G/DL (ref 6.4–8.2)
PTH-INTACT SERPL-MCNC: 12.1 PG/ML (ref 18.4–88)
RBC # BLD AUTO: 2.34 M/UL (ref 4.2–5.3)
RBC MORPH BLD: ABNORMAL
RETICS/RBC NFR AUTO: 1.1 % (ref 0.5–2.5)
SODIUM SERPL-SCNC: 135 MMOL/L (ref 136–145)
WBC # BLD AUTO: 9.9 K/UL (ref 4.6–13.2)

## 2022-10-18 PROCEDURE — P9016 RBC LEUKOCYTES REDUCED: HCPCS

## 2022-10-18 PROCEDURE — 74011250636 HC RX REV CODE- 250/636: Performed by: HOSPITALIST

## 2022-10-18 PROCEDURE — 85045 AUTOMATED RETICULOCYTE COUNT: CPT

## 2022-10-18 PROCEDURE — 82310 ASSAY OF CALCIUM: CPT

## 2022-10-18 PROCEDURE — 86900 BLOOD TYPING SEROLOGIC ABO: CPT

## 2022-10-18 PROCEDURE — 84165 PROTEIN E-PHORESIS SERUM: CPT

## 2022-10-18 PROCEDURE — 36415 COLL VENOUS BLD VENIPUNCTURE: CPT

## 2022-10-18 PROCEDURE — 30233N1 TRANSFUSION OF NONAUTOLOGOUS RED BLOOD CELLS INTO PERIPHERAL VEIN, PERCUTANEOUS APPROACH: ICD-10-PCS | Performed by: HOSPITALIST

## 2022-10-18 PROCEDURE — P9047 ALBUMIN (HUMAN), 25%, 50ML: HCPCS | Performed by: HOSPITALIST

## 2022-10-18 PROCEDURE — 36430 TRANSFUSION BLD/BLD COMPNT: CPT

## 2022-10-18 PROCEDURE — 80053 COMPREHEN METABOLIC PANEL: CPT

## 2022-10-18 PROCEDURE — 74011250636 HC RX REV CODE- 250/636: Performed by: STUDENT IN AN ORGANIZED HEALTH CARE EDUCATION/TRAINING PROGRAM

## 2022-10-18 PROCEDURE — 82962 GLUCOSE BLOOD TEST: CPT

## 2022-10-18 PROCEDURE — 65270000029 HC RM PRIVATE

## 2022-10-18 PROCEDURE — 83521 IG LIGHT CHAINS FREE EACH: CPT

## 2022-10-18 PROCEDURE — 82232 ASSAY OF BETA-2 PROTEIN: CPT

## 2022-10-18 PROCEDURE — 86923 COMPATIBILITY TEST ELECTRIC: CPT

## 2022-10-18 PROCEDURE — 74011250637 HC RX REV CODE- 250/637: Performed by: HOSPITALIST

## 2022-10-18 PROCEDURE — 85018 HEMOGLOBIN: CPT

## 2022-10-18 PROCEDURE — 83970 ASSAY OF PARATHORMONE: CPT

## 2022-10-18 PROCEDURE — 82397 CHEMILUMINESCENT ASSAY: CPT

## 2022-10-18 PROCEDURE — 85025 COMPLETE CBC W/AUTO DIFF WBC: CPT

## 2022-10-18 PROCEDURE — 82784 ASSAY IGA/IGD/IGG/IGM EACH: CPT

## 2022-10-18 RX ORDER — SODIUM CHLORIDE 9 MG/ML
75 INJECTION, SOLUTION INTRAVENOUS CONTINUOUS
Status: DISCONTINUED | OUTPATIENT
Start: 2022-10-18 | End: 2022-10-21 | Stop reason: HOSPADM

## 2022-10-18 RX ORDER — FUROSEMIDE 10 MG/ML
40 INJECTION INTRAMUSCULAR; INTRAVENOUS ONCE
Status: COMPLETED | OUTPATIENT
Start: 2022-10-18 | End: 2022-10-18

## 2022-10-18 RX ORDER — SODIUM CHLORIDE 9 MG/ML
250 INJECTION, SOLUTION INTRAVENOUS AS NEEDED
Status: DISCONTINUED | OUTPATIENT
Start: 2022-10-18 | End: 2022-10-21 | Stop reason: HOSPADM

## 2022-10-18 RX ADMIN — FUROSEMIDE 40 MG: 10 INJECTION, SOLUTION INTRAMUSCULAR; INTRAVENOUS at 15:39

## 2022-10-18 RX ADMIN — PRAVASTATIN SODIUM 10 MG: 20 TABLET ORAL at 22:18

## 2022-10-18 RX ADMIN — ACETAMINOPHEN 650 MG: 325 TABLET, FILM COATED ORAL at 21:07

## 2022-10-18 RX ADMIN — ALBUMIN (HUMAN) 12.5 G: 0.25 INJECTION, SOLUTION INTRAVENOUS at 02:22

## 2022-10-18 RX ADMIN — ONDANSETRON 4 MG: 2 INJECTION INTRAMUSCULAR; INTRAVENOUS at 08:56

## 2022-10-18 RX ADMIN — SODIUM CHLORIDE 150 ML/HR: 9 INJECTION, SOLUTION INTRAVENOUS at 06:47

## 2022-10-18 RX ADMIN — ACETAMINOPHEN 650 MG: 325 TABLET, FILM COATED ORAL at 13:06

## 2022-10-18 RX ADMIN — SODIUM CHLORIDE 75 ML/HR: 9 INJECTION, SOLUTION INTRAVENOUS at 18:00

## 2022-10-18 NOTE — PROGRESS NOTES
Problem: Falls - Risk of  Goal: *Absence of Falls  Description: Document Javier Ashley Fall Risk and appropriate interventions in the flowsheet. Outcome: Progressing Towards Goal  Note: Fall Risk Interventions:  Mobility Interventions: Patient to call before getting OOB         Medication Interventions: Teach patient to arise slowly, Patient to call before getting OOB    Elimination Interventions: Patient to call for help with toileting needs              Problem: Diabetes Self-Management  Goal: *Disease process and treatment process  Description: Define diabetes and identify own type of diabetes; list 3 options for treating diabetes. Outcome: Progressing Towards Goal  Goal: *Incorporating nutritional management into lifestyle  Description: Describe effect of type, amount and timing of food on blood glucose; list 3 methods for planning meals. Outcome: Progressing Towards Goal  Goal: *Incorporating physical activity into lifestyle  Description: State effect of exercise on blood glucose levels. Outcome: Progressing Towards Goal  Goal: *Prevention, detection, treatment of acute complications  Description: List symptoms of hyper- and hypoglycemia; describe how to treat low blood sugar and actions for lowering  high blood glucose level. Outcome: Progressing Towards Goal     Problem: Pain  Goal: *Control of Pain  Outcome: Progressing Towards Goal     Problem: Pressure Injury - Risk of  Goal: *Prevention of pressure injury  Description: Document Noel Scale and appropriate interventions in the flowsheet. Outcome: Progressing Towards Goal  Note: Pressure Injury Interventions:             Activity Interventions: Increase time out of bed, Pressure redistribution bed/mattress(bed type), PT/OT evaluation    Mobility Interventions: HOB 30 degrees or less, Pressure redistribution bed/mattress (bed type), PT/OT evaluation    Nutrition Interventions: Document food/fluid/supplement intake

## 2022-10-18 NOTE — PROGRESS NOTES
2352  Bedside and verbal shift change report given to Ilan Troncoso RN (on coming nurse) by Britton Vences RN (off going nurse). Report included the following information SBAR, Kardex, Intake/Output and MAR.    0729  Bedside and verbal shift change report given by LILI Oleary (off going nurse) to Noah Stone RN(on coming nurse). Report included the following information SBAR, Kardex, Intake/Output and MAR.

## 2022-10-18 NOTE — PROGRESS NOTES
assisted patient in completing Advance Medical Directives. A copy was placed in the chart for scanning and extra copies were left for the patient and her two daughters.  completed visit with patient and offered Pastoral care. Chaplains will continue to follow and will provide pastoral care  as needed or requested.        Sister Mikael Lynne [place], [address]  [phone]

## 2022-10-18 NOTE — ROUTINE PROCESS
Bedside and verbal shift change report given to Hilaria Mccrary RN (on coming nurse) by [address], RN (off going nurse). Report included the following information SBAR, Kardex, Intake/Output and MAR.

## 2022-10-18 NOTE — PROGRESS NOTES
Spoke with MD Fior Tello, he stated pt does not need tele monitoring at this time since calcium is improving. No further concerns at this time.

## 2022-10-18 NOTE — PROGRESS NOTES
Problem: Falls - Risk of  Goal: *Absence of Falls  Description: Document Almaz Joshua Fall Risk and appropriate interventions in the flowsheet.   Outcome: Progressing Towards Goal  Note: Fall Risk Interventions:                 Elimination Interventions: Patient to call for help with toileting needs, Toileting schedule/hourly rounds, Call light in reach              Problem: Pain  Goal: *Control of Pain  Outcome: Progressing Towards Goal

## 2022-10-18 NOTE — ACP (ADVANCE CARE PLANNING)
Advance Care Planning   Advance Care Planning Inpatient Note  [facility] Department    Today's Date: 10/18/2022  Unit: THE [address] SURGICAL/ONCOLOGY    Received request from IDT member. Upon review of chart and communication with care team, patient's decision making abilities are not in question. Patient was/were present in the room during visit. Goals of ACP Conversation:  Discuss Advance Care planning documents    Health Care Decision Makers:      Primary Decision Maker: Marylou Tacho - Daughter - [phone]    Secondary Decision Maker: German Wilson - Daughter - [phone]    Summary:  Completed New Documents    Advance Care Planning Documents (Patient Wishes) on file:  Healthcare Power of /Advance Directive appointment of Health care agent  Living Will/ Advance Directive  Anatomical Gift/Organ donation     Assessment:    Patient was very ready and happy to complete the document.     Interventions:  Assisted in the completion of documents according to patient's wishes at this time    Care Preferences Communicated:  No    Outcomes/Plan:  ACP Discussion Completed    Chaplain Chloe on 10/18/2022 at 12:07 PM

## 2022-10-18 NOTE — PROGRESS NOTES
1957 - Head to toe assessment completed at this time. Pt rated pain 5/10. No distress noted. 20G Right AC. IVF infusing as ordered. Chavis in place draining straw yellow urine. SCDs in place bilaterally as ordered. Bed in lowest position. Call bell and personal belongings within reach. Will continue to monitor.

## 2022-10-18 NOTE — PROGRESS NOTES
Reason for Admission:  Hypercalcemia. RUR Score:  Low; 13%                    Plan for utilizing home health:    Unlikely       PCP: First and Last name:  Tawnya Gonzalez DO     Name of Practice:    Are you a current patient: Yes/No:    Approximate date of last visit:    Can you participate in a virtual visit with your PCP:                     Current Advanced Directive/Advance Care Plan: Full Code      Healthcare Decision Maker:   Click here to complete [id number] including selection of the Healthcare Decision Maker Relationship (ie \"Primary\")             Primary Decision Maker: Nancy Mederos Daughter - [phone]    Secondary Decision Maker: Carmen Gross Daughter - [phone]                  Transition of Care Plan:     Home with physician follow up                  Chart reviewed. 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 She has not eaten really anything in 3 days. She had a colonoscopy 7 days ago with two polyps removed. No complications noted. She noted that couple of days after the procedure, she started having lower abdominal pain. She had seen her PCP after that and was diagnosed with an UTI and started on Bactrim, but ultimately was feeling a little better but then again over this weekend, had worsening abdominal pain, nausea and vomiting and feeling very weak. The patient was evaluated here. She was noted to have hypercalcemia and there is a suspicious lesion on her ileum by CT scan. PCP is Dr. Jairon Hollins. \"    CM met with pt at bedside to discuss care transition. Pt lives alone and does not have any family in the area to assist.  Pt does not have or use DME and drove herself to the hospital.  Pt states she has friends to assist if needed.   Pt is currently pending further work up and results from biopsy to assist with identifying a care plan. Anticipate pt will transition home with physician follow up toward the end of the week.   CM to continue to follow and assist.      Care Management Interventions  Mode of Transport at Discharge: Self  Transition of Care Consult (CM Consult): Discharge Planning  Health Maintenance Reviewed: Yes  Support Systems: Friend/Neighbor  Confirm Follow Up Transport: Self  The Plan for Transition of Care is Related to the Following Treatment Goals : Home with physician follow up  Discharge Location  Patient Expects to be Discharged to[de-identified] Home

## 2022-10-18 NOTE — PROGRESS NOTES
RENAL CONSULT PROGRESS NOTE   10/18/2022    Patient:  Lynn Benitez  :  1970  Gender:  female  MRN #:  [id number]    Reason for Consult: Hypercalcemia and acute renal failure     Subjective:   Says she feels little better  Denied shortness of breath and chest pain  Urinating fine   No other symptoms     Objective:    Visit Vitals  /70   Pulse (!) 58   Temp 98.4 °F (36.9 °C)   Resp 18   Wt 92.6 kg (204 lb 1.6 oz)   SpO2 100%   Breastfeeding No   BMI 35.03 kg/m²       Physical Exam:    Pt awake,  alert   Lung: clear to auscultation  Ext: no edema   CNS- Oriented to time , place and person     Laboratory Data:    Lab Results   Component Value Date    BUN 20 (H) 10/18/2022    BUN 23 (H) 10/17/2022    BUN 35 (H) 10/17/2022     (L) 10/18/2022     (L) 10/17/2022     (L) 10/17/2022    CO2 25 10/18/2022    CO2 22 10/17/2022    CO2 29 10/17/2022     Lab Results   Component Value Date    WBC 9.9 10/18/2022    HGB 6.8 (L) 10/18/2022    HCT 21.4 (L) 10/18/2022     No components found for: CALCIUM, PHOSPHORUS, MAGNESIUM  No results found for: HDL  No results found for: SPECIMENTYP, TURBIDITY, UGLU    Imaging Reveiwed:    CT abdomen:pelvis- New diffuse marrow heterogeneity with new large lytic lesion within the left  iliac bone (approximately 7 cm) with cortical destruction both medially and  laterally. Potentially metastatic disease versus myeloma, is there a history of  malignancy? Left iliac lesion would be amenable to percutaneous biopsy as  necessary. 3. Enlarged leiomyomatous appearance of the uterus with speckled calcification. Simple appearing right adnexal cyst measures approximately 58 mm in maximal  diameter, it is noted to be mobile, now posterior to the uterus, was previously  lateral to the uterus. No free pelvic fluid.     Assessment:    Lynn Benitez is a 50y.o. year old female with ongoing Hypertension, Type 2 diabetes mellitus presented in ED with nausea/vomiting for last 2 days .  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 Acute Renal failure  Hypercalcemia  Hypertension  Dehydration   Anemia       Plan:      Hypercalcemia-  serum calcium improved to 11 mg/dl now  No symptoms   Decent urine output  Adequately volume resuscitated , will give lasix 40 mg iv once now   Decrease normal saline to 75 cc/hour   SPEP/KARLA, vitamin D level, PTH, PTH related peptide  Appreciate oncology assistance   Intake and output, especially urine output   IV loop diuretics for hypoxia/sing of volume overload       Acute renal failure: - Improving   Causes as above  Continue volume infusion   strict urine output recording   Intake and output  Avoid NSAIDS , contrast and nephrotoxin  Dose all meds for current eGFR  As of now no indication of dialysis       Hypertension:   Hold losartan  Consider amlodipine for hypertension      Anemia work up    Agree with 1 unit of transfusion               I have reviewed patient's record for pertinent labs, radiology and other test . I have reviewed old records. I have ordered labs, medications and radiology as necessary and indicated per patient's condition . Total time spent is approximately 31 minutes. Greater than 50 % of that time was spent in counseling and or care coordination.          Jerad Burt MD, MD  [facility].- Nephrology

## 2022-10-18 NOTE — PROGRESS NOTES
Confirmed with MD Ehsan Ruelas he wants pt to be on 75ml hour NS due to increased calcium levels. No further concerns at this time.

## 2022-10-18 NOTE — PROGRESS NOTES
Hospitalist Progress Note    Patient: Eusebio Ulloa MRN: [id number]  CSN: [id number]    YOB: 1970  Age: 50 y.o. Sex: female    DOA: 10/17/2022 LOS:  LOS: 1 day          Chief Complaint:    SHIVAM      Assessment/Plan     1. Hypercalcemia. Improved with hydration  2. Iliac bone lesion. Suspicion for myeloma  3. Nausea and vomiting with abdominal pain. 4.  Diabetes mellitus. 5.  Acute renal failure. improved  6. Severe anemia, will need transfusion for HGb 6.9    Recent use of bactrim    Appreciate nephrology and oncology helpon case    Skeletal survey shows ilaiac bone lesion again as on the CT    She may need bone marrow biopsy    SPEP pending    Updated daughter and patient this am    Clears PO until nausea improved      Disposition :  Patient Active Problem List   Diagnosis Code    Asthma with acute exacerbation J45.901    Obesity E66.9    Type II diabetes mellitus (HCC) E11.9    Hypercalcemia of malignancy E83.52    SHIVAM (acute kidney injury) ([facility] [address] N17.9    Lytic bone lesions on xray M89.9       Subjective:    Feeling weak and nauseated still  No appetite  No diarrhea  No uncontrolled pain    Review of systems:    Constitutional: denies fevers, chills  Respiratory: denies SOB  Cardiovascular: denies chest pain, palpitations  Gastrointestinal: denies vomiting, diarrhea      Vital signs/Intake and Output:  Visit Vitals  /70   Pulse (!) 58   Temp 98.4 °F (36.9 °C)   Resp 18   Wt 92.6 kg (204 lb 1.6 oz)   SpO2 100%   Breastfeeding No   BMI 35.03 kg/m²     Current Shift:  10/18 0701 - 10/18 1900  In: -   Out: 250 [Urine:250]  Last three shifts:  10/16 1901 - 10/18 0700  In: 9870.7 [P.O.:120;  I.V.:9750.7]  Out: 2700 [Urine:2700]    Exam:    General: Well developed, alert, NAD, OX3  CVS:Regular rate and rhythm, no M/R/G, S1/S2 heard, no thrill  Lungs:Clear to auscultation bilaterally, no wheezes, rhonchi, or rales  Abdomen: Soft, Nontender, No distention, Normal Bowel sounds  Extremities: No C/C/E, pulses palpable 2+  Neuro:grossly normal , follows commands  Psych:appropriate                Labs: Results:       Chemistry Recent Labs     10/18/22  0852 10/18/22  0130 10/17/22  2143 10/17/22  0334 10/17/22  0100   GLU  --  87 94 108* 137*   NA  --  135* 132* 132* 131*   K  --  4.0 4.5 3.9 4.5   CL  --  105 104 97* 96*   CO2  --  25 22 29 28   BUN  --  20* 23* 35* 35*   CREA  --  1.68* 1.78* 3.20* 3.49*   CA 11.0* 10.8*  10.7* 11.1* 13.7* 14.3*   AGAP  --  5 6 6 7   BUCR  --  12 13 11* 10*   AP  --  40*  --   --  53   TP  --  11.8*  --   --  14.5*   ALB  --  2.3*  --  2.5* 2.5*   GLOB  --  9.5*  --   --  12.0*   AGRAT  --  0.2*  --   --  0.2*      CBC w/Diff Recent Labs     10/18/22  0130 10/17/22  0100   WBC 9.9 10.8   RBC 2.34* 2.75*   HGB 6.8* 8.0*   HCT 21.4* 24.5*    282   GRANS 20* 29*   LYMPH 77* 66*   EOS 2 0      Cardiac Enzymes No results for input(s): CPK, CKND1, STORM in the last 72 hours. No lab exists for component: CKRMB, TROIP   Coagulation No results for input(s): PTP, INR, APTT, INREXT, INREXT in the last 72 hours. Lipid Panel No results found for: CHOL, CHOLPOCT, CHOLX, CHLST, CHOLV, 587140, HDL, HDLP, LDL, LDLC, DLDLP, 631563, VLDLC, VLDL, TGLX, TRIGL, TRIGP, TGLPOCT, CHHD, CHHDX   BNP No results for input(s): BNPP in the last 72 hours.    Liver Enzymes Recent Labs     10/18/22  0130   TP 11.8*   ALB 2.3*   AP 40*      Thyroid Studies Lab Results   Component Value Date/Time    TSH 6.57 (H) 10/17/2022 09:36 AM        Procedures/imaging: see electronic medical records for all procedures/Xrays and details which were not copied into this note but were reviewed prior to creation of Ursula Peralta MD

## 2022-10-19 ENCOUNTER — APPOINTMENT (OUTPATIENT)
Dept: CT IMAGING | Age: 52
DRG: 824 | End: 2022-10-19
Attending: STUDENT IN AN ORGANIZED HEALTH CARE EDUCATION/TRAINING PROGRAM
Payer: COMMERCIAL

## 2022-10-19 LAB
ABO + RH BLD: NORMAL
ALBUMIN SERPL-MCNC: 2.5 G/DL (ref 3.4–5)
ALBUMIN/GLOB SERPL: 0.2 {RATIO} (ref 0.8–1.7)
ALP SERPL-CCNC: 46 U/L (ref 45–117)
ALT SERPL-CCNC: 35 U/L (ref 13–56)
ANION GAP SERPL CALC-SCNC: 5 MMOL/L (ref 3–18)
AST SERPL-CCNC: 21 U/L (ref 10–38)
ATRIAL RATE: 69 BPM
BASOPHILS # BLD: 0 K/UL (ref 0–0.1)
BASOPHILS NFR BLD: 0 % (ref 0–2)
BILIRUB SERPL-MCNC: 0.6 MG/DL (ref 0.2–1)
BLD PROD TYP BPU: NORMAL
BLOOD GROUP ANTIBODIES SERPL: NORMAL
BPU ID: NORMAL
BUN SERPL-MCNC: 14 MG/DL (ref 7–18)
BUN/CREAT SERPL: 10 (ref 12–20)
CALCIUM SERPL-MCNC: 11.7 MG/DL (ref 8.5–10.1)
CALCULATED P AXIS, ECG09: 26 DEGREES
CALCULATED R AXIS, ECG10: 22 DEGREES
CALCULATED T AXIS, ECG11: 11 DEGREES
CALLED TO:,BCALL1: NORMAL
CHLORIDE SERPL-SCNC: 107 MMOL/L (ref 100–111)
CO2 SERPL-SCNC: 26 MMOL/L (ref 21–32)
CREAT SERPL-MCNC: 1.34 MG/DL (ref 0.6–1.3)
CROSSMATCH RESULT,%XM: NORMAL
DIAGNOSIS, 93000: NORMAL
DIFFERENTIAL METHOD BLD: ABNORMAL
EOSINOPHIL # BLD: 0.2 K/UL (ref 0–0.4)
EOSINOPHIL NFR BLD: 2 % (ref 0–5)
ERYTHROCYTE [DISTWIDTH] IN BLOOD BY AUTOMATED COUNT: 14.5 % (ref 11.6–14.5)
GLOBULIN SER CALC-MCNC: 10.7 G/DL (ref 2–4)
GLUCOSE BLD STRIP.AUTO-MCNC: 105 MG/DL (ref 70–110)
GLUCOSE BLD STRIP.AUTO-MCNC: 129 MG/DL (ref 70–110)
GLUCOSE BLD STRIP.AUTO-MCNC: 135 MG/DL (ref 70–110)
GLUCOSE BLD STRIP.AUTO-MCNC: 162 MG/DL (ref 70–110)
GLUCOSE SERPL-MCNC: 115 MG/DL (ref 74–99)
HCT VFR BLD AUTO: 25.5 % (ref 35–45)
HGB BLD-MCNC: 8.3 G/DL (ref 12–16)
IMM GRANULOCYTES # BLD AUTO: 0 K/UL
IMM GRANULOCYTES NFR BLD AUTO: 0 %
LYMPHOCYTES # BLD: 6.2 K/UL (ref 0.9–3.6)
LYMPHOCYTES NFR BLD: 62 % (ref 21–52)
MCH RBC QN AUTO: 29.3 PG (ref 24–34)
MCHC RBC AUTO-ENTMCNC: 32.5 G/DL (ref 31–37)
MCV RBC AUTO: 90.1 FL (ref 78–100)
MONOCYTES # BLD: 0.7 K/UL (ref 0.05–1.2)
MONOCYTES NFR BLD: 7 % (ref 3–10)
NEUTS BAND NFR BLD MANUAL: 1 % (ref 0–5)
NEUTS SEG # BLD: 2.9 K/UL (ref 1.8–8)
NEUTS SEG NFR BLD: 28 % (ref 40–73)
NRBC # BLD: 0 K/UL (ref 0–0.01)
NRBC BLD-RTO: 0 PER 100 WBC
P-R INTERVAL, ECG05: 152 MS
PLATELET # BLD AUTO: 208 K/UL (ref 135–420)
PLATELET COMMENTS,PCOM: ABNORMAL
PMV BLD AUTO: 9.4 FL (ref 9.2–11.8)
POTASSIUM SERPL-SCNC: 4 MMOL/L (ref 3.5–5.5)
PROT SERPL-MCNC: 13.2 G/DL (ref 6.4–8.2)
Q-T INTERVAL, ECG07: 388 MS
QRS DURATION, ECG06: 100 MS
QTC CALCULATION (BEZET), ECG08: 415 MS
RBC # BLD AUTO: 2.83 M/UL (ref 4.2–5.3)
RBC MORPH BLD: ABNORMAL
SODIUM SERPL-SCNC: 138 MMOL/L (ref 136–145)
SPECIMEN EXP DATE BLD: NORMAL
STATUS OF UNIT,%ST: NORMAL
UNIT DIVISION, %UDIV: 0
VENTRICULAR RATE, ECG03: 69 BPM
WBC # BLD AUTO: 10 K/UL (ref 4.6–13.2)

## 2022-10-19 PROCEDURE — 88185 FLOWCYTOMETRY/TC ADD-ON: CPT

## 2022-10-19 PROCEDURE — 88374 M/PHMTRC ALYS ISHQUANT/SEMIQ: CPT

## 2022-10-19 PROCEDURE — 74011250636 HC RX REV CODE- 250/636: Performed by: RADIOLOGY

## 2022-10-19 PROCEDURE — 88304 TISSUE EXAM BY PATHOLOGIST: CPT

## 2022-10-19 PROCEDURE — 88307 TISSUE EXAM BY PATHOLOGIST: CPT

## 2022-10-19 PROCEDURE — 88333 PATH CONSLTJ SURG CYTO XM 1: CPT

## 2022-10-19 PROCEDURE — 88360 TUMOR IMMUNOHISTOCHEM/MANUAL: CPT

## 2022-10-19 PROCEDURE — 77030003669 CT BX BONE MARROW DIAGNOSTIC

## 2022-10-19 PROCEDURE — 07DR3ZX EXTRACTION OF ILIAC BONE MARROW, PERCUTANEOUS APPROACH, DIAGNOSTIC: ICD-10-PCS | Performed by: RADIOLOGY

## 2022-10-19 PROCEDURE — 88237 TISSUE CULTURE BONE MARROW: CPT

## 2022-10-19 PROCEDURE — 88305 TISSUE EXAM BY PATHOLOGIST: CPT

## 2022-10-19 PROCEDURE — 88311 DECALCIFY TISSUE: CPT

## 2022-10-19 PROCEDURE — 80053 COMPREHEN METABOLIC PANEL: CPT

## 2022-10-19 PROCEDURE — 77030003484 CT BX BONE DP NDL/TROCAR

## 2022-10-19 PROCEDURE — 74011000250 HC RX REV CODE- 250

## 2022-10-19 PROCEDURE — 74011250636 HC RX REV CODE- 250/636: Performed by: STUDENT IN AN ORGANIZED HEALTH CARE EDUCATION/TRAINING PROGRAM

## 2022-10-19 PROCEDURE — 36415 COLL VENOUS BLD VENIPUNCTURE: CPT

## 2022-10-19 PROCEDURE — 88264 CHROMOSOME ANALYSIS 20-25: CPT

## 2022-10-19 PROCEDURE — 74011250637 HC RX REV CODE- 250/637: Performed by: HOSPITALIST

## 2022-10-19 PROCEDURE — 88342 IMHCHEM/IMCYTCHM 1ST ANTB: CPT

## 2022-10-19 PROCEDURE — 74011636637 HC RX REV CODE- 636/637: Performed by: HOSPITALIST

## 2022-10-19 PROCEDURE — 88341 IMHCHEM/IMCYTCHM EA ADD ANTB: CPT

## 2022-10-19 PROCEDURE — 82962 GLUCOSE BLOOD TEST: CPT

## 2022-10-19 PROCEDURE — 88313 SPECIAL STAINS GROUP 2: CPT

## 2022-10-19 PROCEDURE — 0QB33ZX EXCISION OF LEFT PELVIC BONE, PERCUTANEOUS APPROACH, DIAGNOSTIC: ICD-10-PCS | Performed by: RADIOLOGY

## 2022-10-19 PROCEDURE — 65270000029 HC RM PRIVATE

## 2022-10-19 PROCEDURE — 88184 FLOWCYTOMETRY/ TC 1 MARKER: CPT

## 2022-10-19 PROCEDURE — 85025 COMPLETE CBC W/AUTO DIFF WBC: CPT

## 2022-10-19 PROCEDURE — 99152 MOD SED SAME PHYS/QHP 5/>YRS: CPT

## 2022-10-19 RX ORDER — LIDOCAINE HYDROCHLORIDE 10 MG/ML
INJECTION, SOLUTION EPIDURAL; INFILTRATION; INTRACAUDAL; PERINEURAL
Status: COMPLETED
Start: 2022-10-19 | End: 2022-10-19

## 2022-10-19 RX ORDER — NALOXONE HYDROCHLORIDE 0.4 MG/ML
0.4 INJECTION, SOLUTION INTRAMUSCULAR; INTRAVENOUS; SUBCUTANEOUS
Status: DISCONTINUED | OUTPATIENT
Start: 2022-10-19 | End: 2022-10-21 | Stop reason: HOSPADM

## 2022-10-19 RX ORDER — FUROSEMIDE 10 MG/ML
40 INJECTION INTRAMUSCULAR; INTRAVENOUS ONCE
Status: COMPLETED | OUTPATIENT
Start: 2022-10-19 | End: 2022-10-19

## 2022-10-19 RX ORDER — FLUMAZENIL 0.1 MG/ML
0.2 INJECTION INTRAVENOUS AS NEEDED
Status: DISCONTINUED | OUTPATIENT
Start: 2022-10-19 | End: 2022-10-21 | Stop reason: HOSPADM

## 2022-10-19 RX ORDER — FENTANYL CITRATE 50 UG/ML
25-100 INJECTION, SOLUTION INTRAMUSCULAR; INTRAVENOUS
Status: DISCONTINUED | OUTPATIENT
Start: 2022-10-19 | End: 2022-10-21 | Stop reason: HOSPADM

## 2022-10-19 RX ORDER — HEPARIN SODIUM 1000 [USP'U]/ML
1000-10000 INJECTION, SOLUTION INTRAVENOUS; SUBCUTANEOUS
Status: COMPLETED | OUTPATIENT
Start: 2022-10-19 | End: 2022-10-19

## 2022-10-19 RX ORDER — MIDAZOLAM HYDROCHLORIDE 1 MG/ML
.5-2 INJECTION, SOLUTION INTRAMUSCULAR; INTRAVENOUS
Status: DISCONTINUED | OUTPATIENT
Start: 2022-10-19 | End: 2022-10-21 | Stop reason: HOSPADM

## 2022-10-19 RX ORDER — LIDOCAINE HYDROCHLORIDE 10 MG/ML
1-20 INJECTION, SOLUTION EPIDURAL; INFILTRATION; INTRACAUDAL; PERINEURAL
Status: COMPLETED | OUTPATIENT
Start: 2022-10-19 | End: 2022-10-19

## 2022-10-19 RX ADMIN — MIDAZOLAM 1 MG: 1 INJECTION INTRAMUSCULAR; INTRAVENOUS at 09:11

## 2022-10-19 RX ADMIN — LIDOCAINE HYDROCHLORIDE 10 ML: 10 INJECTION, SOLUTION EPIDURAL; INFILTRATION; INTRACAUDAL; PERINEURAL at 09:34

## 2022-10-19 RX ADMIN — MIDAZOLAM 0.5 MG: 1 INJECTION INTRAMUSCULAR; INTRAVENOUS at 09:21

## 2022-10-19 RX ADMIN — FENTANYL CITRATE 50 MCG: 50 INJECTION, SOLUTION INTRAMUSCULAR; INTRAVENOUS at 09:11

## 2022-10-19 RX ADMIN — INSULIN LISPRO 2 UNITS: 100 INJECTION, SOLUTION INTRAVENOUS; SUBCUTANEOUS at 12:35

## 2022-10-19 RX ADMIN — ACETAMINOPHEN 650 MG: 325 TABLET, FILM COATED ORAL at 10:19

## 2022-10-19 RX ADMIN — ACETAMINOPHEN 650 MG: 325 TABLET, FILM COATED ORAL at 21:43

## 2022-10-19 RX ADMIN — FUROSEMIDE 40 MG: 10 INJECTION, SOLUTION INTRAMUSCULAR; INTRAVENOUS at 10:19

## 2022-10-19 RX ADMIN — HEPARIN SODIUM 100 UNITS: 1000 INJECTION INTRAVENOUS; SUBCUTANEOUS at 09:26

## 2022-10-19 RX ADMIN — SODIUM CHLORIDE 75 ML/HR: 9 INJECTION, SOLUTION INTRAVENOUS at 05:23

## 2022-10-19 RX ADMIN — FENTANYL CITRATE 25 MCG: 50 INJECTION, SOLUTION INTRAMUSCULAR; INTRAVENOUS at 09:21

## 2022-10-19 RX ADMIN — PRAVASTATIN SODIUM 10 MG: 20 TABLET ORAL at 21:44

## 2022-10-19 RX ADMIN — FUROSEMIDE 40 MG: 10 INJECTION, SOLUTION INTRAMUSCULAR; INTRAVENOUS at 21:44

## 2022-10-19 NOTE — PROGRESS NOTES
2130  Phlebotomist unable to draw blood, also attempted finger stick with no success. This nurse harry labs from IV line.

## 2022-10-19 NOTE — PROGRESS NOTES
Pt educated on procedure and sedation. Verbalized understanding. Pt confirmed NPO status, nothing to eat or drink since 0000. Pt not currently on anticoagulation medication.  Pt confirmed no issues with sedation in the past.

## 2022-10-19 NOTE — PROGRESS NOTES
Hospitalist Progress Note    Patient: Nette Hodgson MRN: [id number]  CSN: [id number]    YOB: 1970  Age: 50 y.o. Sex: female    DOA: 10/17/2022 LOS:  LOS: 2 days                Assessment/Plan     Patient Active Problem List   Diagnosis Code    Asthma with acute exacerbation J45.901    Obesity E66.9    Type II diabetes mellitus (HCC) E11.9    Hypercalcemia of malignancy E83.52    SHIVAM (acute kidney injury) ([address] N17.9    Lytic bone lesions on xray M89.9        Chief complaint :  SHIVAM    1. Hypercalcemia. Improvingwith hydration  2. Iliac bone lesion. Suspicion for myeloma  3. Nausea and vomiting with abdominal pain. 4.  Diabetes mellitus. 5.  Acute renal failure. improved  6. Severe anemia, transfuse to keep Hb >7     nephrology and oncology following     Skeletal survey shows ilaiac bone lesion again as on the CT     S/pmarrow biopsy     SPEP pending      Disposition : 1-2 days    Review of systems  General: No fevers or chills. Cardiovascular: No chest pain or pressure. No palpitations. Pulmonary: No shortness of breath. Gastrointestinal: No nausea, vomiting. Physical Exam:  General: Awake, cooperative, no acute distress    HEENT: NC, Atraumatic. PERRLA, anicteric sclerae. Lungs: CTA Bilaterally. No Wheezing/Rhonchi/Rales. Heart:  S1 S2,  No murmur, No Rubs, No Gallops  Abdomen: Soft, Non distended, Non tender. +Bowel sounds,   Extremities: No c/c/e  Psych:   Not anxious or agitated. Neurologic:  No acute neurological deficit. Vital signs/Intake and Output:  Visit Vitals  BP (!) 143/74 (BP 1 Location: Left upper arm, BP Patient Position: At rest)   Pulse 60   Temp 98.1 °F (36.7 °C)   Resp 16   Wt 92.5 kg (204 lb)   SpO2 100%   Breastfeeding No   BMI 35.02 kg/m²     Current Shift:  10/19 0701 - 10/19 1900  In: -   Out: 1000 [Urine:1000]  Last three shifts:  10/17 1901 - 10/19 0700  In: 9667 [P.O.:240;  I.V.:4084]  Out: 5000 [Urine:5000]            Labs: Results: Chemistry Recent Labs     10/19/22  1047 10/18/22  2030 10/18/22  0852 10/18/22  0130 10/17/22  2143 10/17/22  0334 10/17/22  0100   *  --   --  87 94 108* 137*     --   --  135* 132* 132* 131*   K 4.0  --   --  4.0 4.5 3.9 4.5     --   --  105 104 97* 96*   CO2 26  --   --  25 22 29 28   BUN 14  --   --  20* 23* 35* 35*   CREA 1.34*  --   --  1.68* 1.78* 3.20* 3.49*   CA 11.7* 11.3* 11.0* 10.8*  10.7* 11.1* 13.7* 14.3*   AGAP 5  --   --  5 6 6 7   BUCR 10*  --   --  12 13 11* 10*   AP 46  --   --  40*  --   --  53   TP 13.2*  --   --  11.8*  --   --  14.5*   ALB 2.5*  --   --  2.3*  --  2.5* 2.5*   GLOB 10.7*  --   --  9.5*  --   --  12.0*   AGRAT 0.2*  --   --  0.2*  --   --  0.2*      CBC w/Diff Recent Labs     10/19/22  0511 10/18/22  2030 10/18/22  0130 10/17/22  0100   WBC 10.0  --  9.9 10.8   RBC 2.83*  --  2.34* 2.75*   HGB 8.3* 8.4* 6.8* 8.0*   HCT 25.5* 25.6* 21.4* 24.5*     --  200 282   GRANS 28*  --  20* 29*   LYMPH 62*  --  77* 66*   EOS 2  --  2 0      Cardiac Enzymes No results for input(s): CPK, CKND1, STORM in the last 72 hours. No lab exists for component: CKRMB, TROIP   Coagulation No results for input(s): PTP, INR, APTT, INREXT in the last 72 hours. Lipid Panel No results found for: CHOL, CHOLPOCT, CHOLX, CHLST, CHOLV, 749741, HDL, HDLP, LDL, LDLC, DLDLP, 982168, VLDLC, VLDL, TGLX, TRIGL, TRIGP, TGLPOCT, CHHD, CHHDX   BNP No results for input(s): BNPP in the last 72 hours.    Liver Enzymes Recent Labs     10/19/22  1047   TP 13.2*   ALB 2.5*   AP 46      Thyroid Studies Lab Results   Component Value Date/Time    TSH 6.57 (H) 10/17/2022 09:36 AM        Procedures/imaging: see electronic medical records for all procedures/Xrays and details which were not copied into this note but were reviewed prior to creation of Plan

## 2022-10-19 NOTE — PROGRESS NOTES
TRANSFER - OUT REPORT:    Verbal report given to [facility] RN on Chito Mondragon  being transferred to [address] [place] [address] for routine progression of care       Report consisted of patients Situation, Background, Assessment and   Recommendations(SBAR). Information from the following report(s) SBAR, Procedure Summary and MAR was reviewed with the receiving nurse. Lines:   Peripheral IV 10/17/22 (Active)   Site Assessment Clean, dry, & intact 10/18/22 2000   Phlebitis Assessment 0 10/18/22 2000   Infiltration Assessment 0 10/18/22 2000   Dressing Status Clean, dry, & intact 10/18/22 2000   Dressing Type Transparent 10/18/22 2000   Hub Color/Line Status Flushed 10/18/22 2000   Action Taken Open ports on tubing capped 10/18/22 2000   Alcohol Cap Used Yes 10/18/22 2000       Peripheral IV 10/18/22 Anterior;Proximal;Right Forearm (Active)   Site Assessment Clean, dry, & intact 10/18/22 2000   Phlebitis Assessment 0 10/18/22 2000   Infiltration Assessment 0 10/18/22 2000   Dressing Status Clean, dry, & intact 10/18/22 2000   Dressing Type Transparent 10/18/22 2000   Hub Color/Line Status Infusing 10/18/22 2000   Action Taken Open ports on tubing capped 10/18/22 2000   Alcohol Cap Used Yes 10/18/22 2000        Opportunity for questions and clarification was provided.       Patient transported with:   [employer]

## 2022-10-19 NOTE — PROGRESS NOTES
RENAL CONSULT PROGRESS NOTE   10/19/2022    Patient:  Marietta Singleton  :  1970  Gender:  female  MRN #:  [id number]    Reason for Consult: Hypercalcemia and acute renal failure     Subjective:   Saw her after bone biopsy ,   Denied shortness of breath and chest pain  Urinating fine   No other symptoms     Objective:    Visit Vitals  /69   Pulse (!) 59   Temp 98 °F (36.7 °C)   Resp 12   Wt 92.5 kg (204 lb)   SpO2 100%   Breastfeeding No   BMI 35.02 kg/m²       Physical Exam:    Pt awake,  alert   Lung: clear to auscultation  Ext: no edema   CNS- Oriented to time , place and person     Laboratory Data:    Lab Results   Component Value Date    BUN 20 (H) 10/18/2022    BUN 23 (H) 10/17/2022    BUN 35 (H) 10/17/2022     (L) 10/18/2022     (L) 10/17/2022     (L) 10/17/2022    CO2 25 10/18/2022    CO2 22 10/17/2022    CO2 29 10/17/2022     Lab Results   Component Value Date    WBC 10.0 10/19/2022    HGB 8.3 (L) 10/19/2022    HCT 25.5 (L) 10/19/2022     No components found for: CALCIUM, PHOSPHORUS, MAGNESIUM  No results found for: HDL  No results found for: SPECIMENTYP, TURBIDITY, UGLU    Imaging Reveiwed:    CT abdomen:pelvis- New diffuse marrow heterogeneity with new large lytic lesion within the left  iliac bone (approximately 7 cm) with cortical destruction both medially and  laterally. Potentially metastatic disease versus myeloma, is there a history of  malignancy? Left iliac lesion would be amenable to percutaneous biopsy as  necessary. 3. Enlarged leiomyomatous appearance of the uterus with speckled calcification. Simple appearing right adnexal cyst measures approximately 58 mm in maximal  diameter, it is noted to be mobile, now posterior to the uterus, was previously  lateral to the uterus. No free pelvic fluid.     Assessment:    Marietta Singleton is a 50y.o. year old female with ongoing Hypertension, Type 2 diabetes mellitus presented in ED with nausea/vomiting for last 2 days .  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 she has bone biopsy this morning     Acute Renal failure- Improving   Hypercalcemia- Fluctuating   Hypertension  Anemia       Plan:      Hypercalcemia-  serum calcium improved compared to admission but slowly up trending   No symptoms   Decent urine output  Adequately volume resuscitated, will try to manage with normal saline and iv lasix   Continue normal saline to 75 cc/hour , give lasix 40 mg IV BID - 2 doses today to excrete calcium in urine   SPEP/KARLA, vitamin D level, PTH- suppressed , PTH related peptide- pending   Appreciate oncology assistance   Intake and output, especially urine output   If renal function further improves and calcium does not will give her  iv bisphosphonate       Acute renal failure: - Improving , BMP pending this morning   Causes as above  Continue volume infusion   strict urine output recording   Intake and output  Avoid NSAIDS , contrast and nephrotoxin  Dose all meds for current eGFR  As of now no indication of dialysis       Hypertension:   Hold losartan  Consider amlodipine for hypertension      Anemia work up/so PRBC transusion on 10/18                  I have reviewed patient's record for pertinent labs, radiology and other test . I have reviewed old records. I have ordered labs, medications and radiology as necessary and indicated per patient's condition . Total time spent is approximately 32 minutes.  Greater than 50 % of that time was spent in counseling and or care coordination.          Lynette Garcias MD, MD  [facility].- Nephrology

## 2022-10-19 NOTE — PROGRESS NOTES
Hematology / Oncology Initial Consult Note    Admit Date: 10/17/2022    Reason for Consult: Concern for Malignancy    Requesting Physician: Dr. Destiney Vu    Assessment:     Ermias Naqvi is a 50 y.o., BLACK/, female, who I have been asked to see for hypercalcemia and lytic lesion. Principal Problem:    Hypercalcemia of malignancy (10/17/2022)    Active Problems:    Type II diabetes mellitus ([facility] [address] (4/13/2017)      SHIVAM (acute kidney injury) ([facility] [address] (10/18/2022)      Lytic bone lesions on xray (10/18/2022)    Abdominal pain/Hypercalcemia: Labs with Ca 14.3 on admission, improving on IVF. PTH low. PTHrP and myeloma labs in progress. Concern for MM: large lytic lesion in iliac bone, anemia, SHIVAM, and large globulin gap suggestive of active myeloma. Bone survey with the L iliac lesion. Will order Ct-guided bx of L iliac lesion for dx. Normocytic Anemia: Hg stable at 8.3. B12, folate normal. Ferritin elevated. No hemolysis. Transfuse to maintain Hg>7  SHIVAM: followed by Nephrology. Cr improving    Plan:     - CT-guided Bone bx of L iliac lesion  - NPO except meds  - Follow up PtHrP and MM labs  - Agree with ongoing IVF for hyprcalcemia  - Transfuse to maintain Hg>7  - Will continue to follow    Regan Horowitz MD  [facility] [phone]      History of Present Illness: Ermias Naqvi is a 50 y.o. female with Diabetes, Hypertension, Hypercholesterolemia who presented to the ER with nausea x2-3 days. History obtained per chart review as patient was off epps. She noted having a colonoscopy a week ago with two polyps removed. After the procedure, she noted increasing abdominal pain that diminished her appetite. Labs in the ED were significant for CA 14.3, Cr 3.49, Hg 8, and CT A/P with a large lytic lesion in the left iliac bone measuring 7cm with heterogenous appearing bone. Nephrology was consulted, was given IVF, and calcitonin.  She denied any prior history of kidney dysfunction and Ca noted to improve after IVF. No acute overnight events  Notes decreased abd pain and some fatigue  Denies any bone pain or neuropathy  Ca is improving    Past Medical History:   Diagnosis Date    Asthma     Diabetes ([address]     High cholesterol     Hypertension        Past Surgical History:   Procedure Laterality Date    HX GYN      tubal ligation        No family history on file. Social History     Socioeconomic History    Marital status: SINGLE   Tobacco Use    Smoking status: Former     Types: Cigarettes     Quit date: 2015     Years since quittin.5    Smokeless tobacco: Never   Substance and Sexual Activity    Alcohol use: No    Drug use: No       Current Facility-Administered Medications   Medication Dose Route Frequency    0.9% sodium chloride infusion 250 mL  250 mL IntraVENous PRN    0.9% sodium chloride infusion  75 mL/hr IntraVENous CONTINUOUS    ondansetron (ZOFRAN) injection 4 mg  4 mg IntraVENous Q6H PRN    insulin lispro (HUMALOG) injection   SubCUTAneous AC&HS    glucose chewable tablet 16 g  4 Tablet Oral PRN    glucagon (GLUCAGEN) injection 1 mg  1 mg IntraMUSCular PRN    dextrose 10% infusion 0-250 mL  0-250 mL IntraVENous PRN    pravastatin (PRAVACHOL) tablet 10 mg  10 mg Oral QHS    acetaminophen (TYLENOL) tablet 650 mg  650 mg Oral Q6H PRN       Prior to Admission medications    Medication Sig Start Date End Date Taking? Authorizing Provider   SITagliptin-metFORMIN (Janumet) 50-1,000 mg per tablet Take 1 Tablet by mouth two (2) times daily (with meals). Yes Provider, Historical   trimethoprim-sulfamethoxazole (Bactrim DS) 160-800 mg per tablet Take 1 Tablet by mouth two (2) times a day. Yes Provider, Historical   losartan (COZAAR) 50 mg tablet Take 50 mg by mouth daily. Yes Other, MD Sharon   metFORMIN (GLUCOPHAGE) 850 mg tablet Take 1 Tab by mouth two (2) times daily (with meals).  17  Yes Tito Mayes MD   Diabetic Supplies, Miscellan. kit 1 Each by Does Not Apply route daily. 4/14/17  Yes Onelia Badillo MD   multivitamin, tx-iron-ca-min (THERA-M W/ IRON) 9 mg iron-400 mcg tab tablet Take 1 Tab by mouth daily. Formulary substitution for DAILY MULTIVITAMIN   Yes Provider, Historical   butalbital-acetaminophen-caff (Fioricet) -40 mg per capsule Take 1 Capsule by mouth every six (6) hours as needed for Headache. Patient not taking: Reported on 10/17/2022 7/10/21   Bam Richard MD   meclizine (ANTIVERT) 25 mg tablet Take 1 tablet by mouth every 6 hours for the next 3 days. Then stop taking the meclizine. Restart the meclizine for 3 day intervals if vertigo/ dizziness returns. Patient not taking: Reported on 10/17/2022 7/10/21   Bam Richard MD   ondansetron (Zofran ODT) 4 mg disintegrating tablet 1 Tablet by SubLINGual route every eight (8) hours as needed for Nausea or Vomiting. Patient not taking: Reported on 10/17/2022 7/10/21   Bam Richard MD   amoxicillin-clavulanate (Augmentin) 875-125 mg per tablet Take 1 Tablet by mouth two (2) times a day. Patient not taking: Reported on 10/17/2022 7/10/21   Bam Richard MD   meclizine (ANTIVERT) 25 mg tablet Take 1 tablet by mouth every 6 hours for the next 3 days. Then stop taking the meclizine. Restart the meclizine for 3 day intervals if vertigo/ dizziness returns. Patient not taking: Reported on 10/17/2022 7/10/21   Bam Richard MD   ketorolac (TORADOL) 10 mg tablet Take 1 Tablet by mouth every eight (8) hours as needed for Pain. Patient not taking: Reported on 10/17/2022 7/10/21   Bam Richard MD   PSEUDOEPHEDRINE-guaiFENesin [facility] D)  mg per tablet Take 1 Tab by mouth every twelve (12) hours. Patient not taking: Reported on 10/17/2022 1/20/20   MARYLU De Los Santos   pravastatin (PRAVACHOL) 10 mg tablet Take  by mouth nightly.   Patient not taking: Reported on 10/17/2022    Other, MD Sharon   SITagliptin (JANUVIA) 25 mg tablet Take 25 mg by mouth daily. Patient not taking: Reported on 10/17/2022    Other, MD Sharon   albuterol (VENTOLIN HFA) 90 mcg/actuation inhaler Take 2 Puffs by inhalation every six (6) hours as needed for Wheezing. 17   Rosey Romero MD       No Known Allergies      Physical Exam:    Temp (24hrs), Av.1 °F (36.7 °C), Min:97.2 °F (36.2 °C), Max:98.6 °F (37 °C)    General: Alert , Oriented, in no distress  HEENT: no pallor, anicteric sclera, oral pharynx without lesion   No cervical, supraclavicular, axillary and inguinal lymphadenopathy palpated  Heart: regular rate, and rhythm, without murmur, gallop or rubbing  Lungs:breathing comfortably on room air, clear to auscultation and percussion bilaterally  ABD: bowel sound present, soft, nondistended, nontender, no hepatosplenomegaly or mass  Extremities: warm, well perfused, no edema  MSK: no tenderness along the spine or long bones  Skin: No rash  Neuro: non-focal      Imaging:  CT A/P:     1. No acute pathology within the abdomen or pelvis. 2. New diffuse marrow heterogeneity with new large lytic lesion within the left  iliac bone (approximately 7 cm) with cortical destruction both medially and  laterally. Potentially metastatic disease versus myeloma, is there a history of  malignancy? Left iliac lesion would be amenable to percutaneous biopsy as  necessary. 3. Enlarged leiomyomatous appearance of the uterus with speckled calcification. Simple appearing right adnexal cyst measures approximately 58 mm in maximal  diameter, it is noted to be mobile, now posterior to the uterus, was previously  lateral to the uterus. No free pelvic fluid. 4. Hepatic steatosis. Thin-walled overdistended gallbladder.

## 2022-10-19 NOTE — PROGRESS NOTES
Problem: Falls - Risk of  Goal: *Absence of Falls  Description: Document Corine Embs Fall Risk and appropriate interventions in the flowsheet. Outcome: Progressing Towards Goal  Note: Fall Risk Interventions:  Mobility Interventions: Patient to call before getting OOB         Medication Interventions: Teach patient to arise slowly    Elimination Interventions: Patient to call for help with toileting needs              Problem: Patient Education: Go to Patient Education Activity  Goal: Patient/Family Education  Outcome: Progressing Towards Goal     Problem: Diabetes Self-Management  Goal: *Disease process and treatment process  Description: Define diabetes and identify own type of diabetes; list 3 options for treating diabetes.   Outcome: Progressing Towards Goal

## 2022-10-19 NOTE — PROCEDURES
Interventional Radiology Brief Procedure Note    Interventional Radiologist: Reji Richards MD    Pre-operative Diagnosis: Left iliac lesion. Post-operative Diagnosis: Same as pre-operative diagnosis    Procedure(s) Performed:  CT guided bone marrow aspirate/biopsy and CT guided biopsy of left iliac bone lesion. Anesthesia:  Local and Moderate Sedation    Findings: CT guided bone marrow aspirate/biopsy and CT guided biopsy of left iliac bone lesion. Specimens sent to pathology.      Complications: None    Estimated Blood Loss:  minimal    Tubes and Drains: None    Specimens: as above     Condition: Good      Reji Richards MD  [facility]  10/19/2022

## 2022-10-19 NOTE — PROGRESS NOTES
Problem: Falls - Risk of  Goal: *Absence of Falls  Description: Document Boogie Benson Fall Risk and appropriate interventions in the flowsheet. Outcome: Progressing Towards Goal  Note: Fall Risk Interventions:  Mobility Interventions: Patient to call before getting OOB         Medication Interventions: Teach patient to arise slowly    Elimination Interventions: Patient to call for help with toileting needs              Problem: Patient Education: Go to Patient Education Activity  Goal: Patient/Family Education  Outcome: Progressing Towards Goal     Problem: Diabetes Self-Management  Goal: *Disease process and treatment process  Description: Define diabetes and identify own type of diabetes; list 3 options for treating diabetes. Outcome: Progressing Towards Goal  Goal: *Incorporating nutritional management into lifestyle  Description: Describe effect of type, amount and timing of food on blood glucose; list 3 methods for planning meals. Outcome: Progressing Towards Goal  Goal: *Incorporating physical activity into lifestyle  Description: State effect of exercise on blood glucose levels. Outcome: Progressing Towards Goal  Goal: *Developing strategies to promote health/change behavior  Description: Define the ABC's of diabetes; identify appropriate screenings, schedule and personal plan for screenings. Outcome: Progressing Towards Goal  Goal: *Using medications safely  Description: State effect of diabetes medications on diabetes; name diabetes medication taking, action and side effects. Outcome: Progressing Towards Goal  Goal: *Monitoring blood glucose, interpreting and using results  Description: Identify recommended blood glucose targets  and personal targets. Outcome: Progressing Towards Goal  Goal: *Prevention, detection, treatment of acute complications  Description: List symptoms of hyper- and hypoglycemia; describe how to treat low blood sugar and actions for lowering  high blood glucose level.   Outcome: Progressing Towards Goal  Goal: *Prevention, detection and treatment of chronic complications  Description: Define the natural course of diabetes and describe the relationship of blood glucose levels to long term complications of diabetes. Outcome: Progressing Towards Goal  Goal: *Developing strategies to address psychosocial issues  Description: Describe feelings about living with diabetes; identify support needed and support network  Outcome: Progressing Towards Goal  Goal: *Insulin pump training  Outcome: Progressing Towards Goal  Goal: *Sick day guidelines  Outcome: Progressing Towards Goal  Goal: *Patient Specific Goal (EDIT GOAL, INSERT TEXT)  Outcome: Progressing Towards Goal     Problem: Patient Education: Go to Patient Education Activity  Goal: Patient/Family Education  Outcome: Progressing Towards Goal     Problem: Pain  Goal: *Control of Pain  Outcome: Progressing Towards Goal     Problem: Pressure Injury - Risk of  Goal: *Prevention of pressure injury  Description: Document Noel Scale and appropriate interventions in the flowsheet. Outcome: Progressing Towards Goal  Note: Pressure Injury Interventions:             Activity Interventions: Increase time out of bed    Mobility Interventions: Pressure redistribution bed/mattress (bed type)    Nutrition Interventions: Document food/fluid/supplement intake                     Problem: Patient Education: Go to Patient Education Activity  Goal: Patient/Family Education  Outcome: Progressing Towards Goal

## 2022-10-20 LAB
ALBUMIN SERPL ELPH-MCNC: 3.4 G/DL (ref 2.9–4.4)
ALBUMIN SERPL-MCNC: 2.3 G/DL (ref 3.4–5)
ALBUMIN/GLOB SERPL: 0.2 {RATIO} (ref 0.8–1.7)
ALBUMIN/GLOB SERPL: 0.5 {RATIO} (ref 0.7–1.7)
ALP SERPL-CCNC: 51 U/L (ref 45–117)
ALPHA1 GLOB SERPL ELPH-MCNC: 0.3 G/DL (ref 0–0.4)
ALPHA2 GLOB SERPL ELPH-MCNC: 0.9 G/DL (ref 0.4–1)
ALT SERPL-CCNC: 30 U/L (ref 13–56)
ANION GAP SERPL CALC-SCNC: 4 MMOL/L (ref 3–18)
AST SERPL-CCNC: 20 U/L (ref 10–38)
B-GLOBULIN SERPL ELPH-MCNC: 1.1 G/DL (ref 0.7–1.3)
BASOPHILS # BLD: 0 K/UL (ref 0–0.1)
BASOPHILS NFR BLD: 0 % (ref 0–2)
BILIRUB SERPL-MCNC: 0.5 MG/DL (ref 0.2–1)
BUN SERPL-MCNC: 18 MG/DL (ref 7–18)
BUN/CREAT SERPL: 14 (ref 12–20)
CALCIUM SERPL-MCNC: 12 MG/DL (ref 8.5–10.1)
CALCIUM SERPL-MCNC: 12.1 MG/DL (ref 8.5–10.1)
CHLORIDE SERPL-SCNC: 103 MMOL/L (ref 100–111)
CO2 SERPL-SCNC: 24 MMOL/L (ref 21–32)
CREAT SERPL-MCNC: 1.31 MG/DL (ref 0.6–1.3)
DIFFERENTIAL METHOD BLD: ABNORMAL
EOSINOPHIL # BLD: 0.5 K/UL (ref 0–0.4)
EOSINOPHIL NFR BLD: 4 % (ref 0–5)
ERYTHROCYTE [DISTWIDTH] IN BLOOD BY AUTOMATED COUNT: 14.1 % (ref 11.6–14.5)
GAMMA GLOB SERPL ELPH-MCNC: 5.2 G/DL (ref 0.4–1.8)
GLOBULIN SER CALC-MCNC: 10.5 G/DL (ref 2–4)
GLOBULIN SER-MCNC: 7.5 G/DL (ref 2.2–3.9)
GLUCOSE BLD STRIP.AUTO-MCNC: 142 MG/DL (ref 70–110)
GLUCOSE BLD STRIP.AUTO-MCNC: 164 MG/DL (ref 70–110)
GLUCOSE BLD STRIP.AUTO-MCNC: 175 MG/DL (ref 70–110)
GLUCOSE BLD STRIP.AUTO-MCNC: 177 MG/DL (ref 70–110)
GLUCOSE SERPL-MCNC: 121 MG/DL (ref 74–99)
HCT VFR BLD AUTO: 26.4 % (ref 35–45)
HGB BLD-MCNC: 8.7 G/DL (ref 12–16)
IGA SERPL-MCNC: 22 MG/DL (ref 87–352)
IGG SERPL-MCNC: 6407 MG/DL (ref 586–1602)
IGM SERPL-MCNC: 21 MG/DL (ref 26–217)
IMM GRANULOCYTES # BLD AUTO: 0 K/UL
IMM GRANULOCYTES NFR BLD AUTO: 0 %
INTERPRETATION SERPL IEP-IMP: ABNORMAL
LYMPHOCYTES # BLD: 7 K/UL (ref 0.9–3.6)
LYMPHOCYTES NFR BLD: 61 % (ref 21–52)
M PROTEIN SERPL ELPH-MCNC: 5.1 G/DL
MCH RBC QN AUTO: 29.1 PG (ref 24–34)
MCHC RBC AUTO-ENTMCNC: 33 G/DL (ref 31–37)
MCV RBC AUTO: 88.3 FL (ref 78–100)
MONOCYTES # BLD: 0.8 K/UL (ref 0.05–1.2)
MONOCYTES NFR BLD: 7 % (ref 3–10)
NEUTS SEG # BLD: 3.2 K/UL (ref 1.8–8)
NEUTS SEG NFR BLD: 28 % (ref 40–73)
NRBC # BLD: 0 K/UL (ref 0–0.01)
NRBC BLD-RTO: 0 PER 100 WBC
PLATELET # BLD AUTO: 217 K/UL (ref 135–420)
PMV BLD AUTO: 9.7 FL (ref 9.2–11.8)
POTASSIUM SERPL-SCNC: 3.7 MMOL/L (ref 3.5–5.5)
PROT SERPL-MCNC: 10.9 G/DL (ref 6–8.5)
PROT SERPL-MCNC: 12.8 G/DL (ref 6.4–8.2)
RBC # BLD AUTO: 2.99 M/UL (ref 4.2–5.3)
RBC MORPH BLD: ABNORMAL
RBC MORPH BLD: ABNORMAL
SODIUM SERPL-SCNC: 131 MMOL/L (ref 136–145)
WBC # BLD AUTO: 11.5 K/UL (ref 4.6–13.2)
WBC MORPH BLD: ABNORMAL

## 2022-10-20 PROCEDURE — 82962 GLUCOSE BLOOD TEST: CPT

## 2022-10-20 PROCEDURE — 74011636637 HC RX REV CODE- 636/637: Performed by: HOSPITALIST

## 2022-10-20 PROCEDURE — 74011250636 HC RX REV CODE- 250/636: Performed by: HOSPITALIST

## 2022-10-20 PROCEDURE — 74011250636 HC RX REV CODE- 250/636: Performed by: STUDENT IN AN ORGANIZED HEALTH CARE EDUCATION/TRAINING PROGRAM

## 2022-10-20 PROCEDURE — 74011250636 HC RX REV CODE- 250/636: Performed by: INTERNAL MEDICINE

## 2022-10-20 PROCEDURE — 85025 COMPLETE CBC W/AUTO DIFF WBC: CPT

## 2022-10-20 PROCEDURE — 74011250637 HC RX REV CODE- 250/637: Performed by: HOSPITALIST

## 2022-10-20 PROCEDURE — 82310 ASSAY OF CALCIUM: CPT

## 2022-10-20 PROCEDURE — 80053 COMPREHEN METABOLIC PANEL: CPT

## 2022-10-20 PROCEDURE — 74011250637 HC RX REV CODE- 250/637: Performed by: INTERNAL MEDICINE

## 2022-10-20 PROCEDURE — 65270000029 HC RM PRIVATE

## 2022-10-20 PROCEDURE — 36415 COLL VENOUS BLD VENIPUNCTURE: CPT

## 2022-10-20 RX ORDER — POLYETHYLENE GLYCOL 3350 17 G/17G
17 POWDER, FOR SOLUTION ORAL DAILY
Status: DISCONTINUED | OUTPATIENT
Start: 2022-10-20 | End: 2022-10-21 | Stop reason: HOSPADM

## 2022-10-20 RX ORDER — MORPHINE SULFATE 2 MG/ML
2 INJECTION, SOLUTION INTRAMUSCULAR; INTRAVENOUS
Status: DISCONTINUED | OUTPATIENT
Start: 2022-10-20 | End: 2022-10-21 | Stop reason: HOSPADM

## 2022-10-20 RX ORDER — AMLODIPINE BESYLATE 5 MG/1
5 TABLET ORAL DAILY
Status: DISCONTINUED | OUTPATIENT
Start: 2022-10-20 | End: 2022-10-21 | Stop reason: HOSPADM

## 2022-10-20 RX ORDER — AMOXICILLIN 250 MG
2 CAPSULE ORAL DAILY
Status: DISCONTINUED | OUTPATIENT
Start: 2022-10-20 | End: 2022-10-21 | Stop reason: HOSPADM

## 2022-10-20 RX ADMIN — DOCUSATE SODIUM 50 MG AND SENNOSIDES 8.6 MG 2 TABLET: 8.6; 5 TABLET, FILM COATED ORAL at 06:56

## 2022-10-20 RX ADMIN — ACETAMINOPHEN 650 MG: 325 TABLET, FILM COATED ORAL at 06:56

## 2022-10-20 RX ADMIN — POLYETHYLENE GLYCOL 3350 17 G: 17 POWDER, FOR SOLUTION ORAL at 08:57

## 2022-10-20 RX ADMIN — MORPHINE SULFATE 2 MG: 2 INJECTION, SOLUTION INTRAMUSCULAR; INTRAVENOUS at 08:57

## 2022-10-20 RX ADMIN — ONDANSETRON 4 MG: 2 INJECTION INTRAMUSCULAR; INTRAVENOUS at 05:06

## 2022-10-20 RX ADMIN — INSULIN LISPRO 2 UNITS: 100 INJECTION, SOLUTION INTRAVENOUS; SUBCUTANEOUS at 16:31

## 2022-10-20 RX ADMIN — PAMIDRONATE DISODIUM 90 MG: 9 INJECTION INTRAVENOUS at 10:07

## 2022-10-20 RX ADMIN — LACTULOSE 30 ML: 20 SOLUTION ORAL at 10:07

## 2022-10-20 RX ADMIN — PRAVASTATIN SODIUM 10 MG: 20 TABLET ORAL at 21:31

## 2022-10-20 RX ADMIN — INSULIN LISPRO 2 UNITS: 100 INJECTION, SOLUTION INTRAVENOUS; SUBCUTANEOUS at 12:46

## 2022-10-20 RX ADMIN — INSULIN LISPRO 2 UNITS: 100 INJECTION, SOLUTION INTRAVENOUS; SUBCUTANEOUS at 21:30

## 2022-10-20 RX ADMIN — AMLODIPINE BESYLATE 5 MG: 5 TABLET ORAL at 12:46

## 2022-10-20 NOTE — PROGRESS NOTES
Hematology / Oncology Initial Consult Note    Admit Date: 10/17/2022    Reason for Consult: Concern for Malignancy    Requesting Physician: Dr. Sergei Painter    Assessment:     Yousif Eng is a 50 y.o., BLACK/, female, who I have been asked to see for hypercalcemia and lytic lesion. Principal Problem:    Hypercalcemia of malignancy (10/17/2022)    Active Problems:    Type II diabetes mellitus ([facility] [address] (4/13/2017)      SHIVAM (acute kidney injury) ([facility] [address] (10/18/2022)      Lytic bone lesions on xray (10/18/2022)    Abdominal pain/Hypercalcemia: Labs with Ca 14.3 on admission. PTH low. PTHrP and myeloma labs in progress. On IVF, but Ca still rising. Will give Pomidronate 90mg IV x1   Concern for MM: large lytic lesion in iliac bone, anemia, SHIVAM, and large globulin gap suggestive of active myeloma. Bone survey with the L iliac lesion. S/p CT-guided bone bx- pending results  Normocytic Anemia: Hg stable at 8.7. B12, folate normal. Ferritin elevated. No hemolysis. Transfuse to maintain Hg>7  SHIVAM: followed by Nephrology. Cr improving    Plan:     - follow up CT-guided Bone bx of L iliac   - Pamidronate 90mg IV x1 for hypercalcemia  - Follow up PtHrP and MM labs  - Transfuse to maintain Hg>7  - Will continue to follow    Marian Kumar MD  [facility]  Cell [phone]      History of Present Illness: Yousif Eng is a 50 y.o. female with Diabetes, Hypertension, Hypercholesterolemia who presented to the ER with nausea x2-3 days. History obtained per chart review as patient was off epps. She noted having a colonoscopy a week ago with two polyps removed. After the procedure, she noted increasing abdominal pain that diminished her appetite. Labs in the ED were significant for CA 14.3, Cr 3.49, Hg 8, and CT A/P with a large lytic lesion in the left iliac bone measuring 7cm with heterogenous appearing bone. Nephrology was consulted, was given IVF, and calcitonin.  She denied any prior history of kidney dysfunction and Ca noted to improve after IVF. No acute overnight events  S/p BMBx yesterday and well tolerated  This am, notes increased abd pain and nausea  Ca level elevated to 12    Past Medical History:   Diagnosis Date    Asthma     Diabetes ([address]     High cholesterol     Hypertension        Past Surgical History:   Procedure Laterality Date    HX GYN      tubal ligation        No family history on file.     Social History     Socioeconomic History    Marital status: SINGLE   Tobacco Use    Smoking status: Former     Types: Cigarettes     Quit date: 2015     Years since quittin.5    Smokeless tobacco: Never   Substance and Sexual Activity    Alcohol use: No    Drug use: No       Current Facility-Administered Medications   Medication Dose Route Frequency    morphine injection 2 mg  2 mg IntraVENous Q4H PRN    senna-docusate (PERICOLACE) 8.6-50 mg per tablet 2 Tablet  2 Tablet Oral DAILY    polyethylene glycol (MIRALAX) packet 17 g  17 g Oral DAILY    pamidronate (AREDIA) 90 mg in 0.9% sodium chloride 250 mL infusion  90 mg IntraVENous ONCE    fentaNYL citrate (PF) injection  mcg   mcg IntraVENous Rad Multiple    midazolam (VERSED) injection 0.5-2 mg  0.5-2 mg IntraVENous Rad Multiple    flumazeniL (ROMAZICON) 0.1 mg/mL injection 0.2 mg  0.2 mg IntraVENous PRN    naloxone (NARCAN) injection 0.4 mg  0.4 mg IntraVENous Rad Multiple    0.9% sodium chloride infusion 250 mL  250 mL IntraVENous PRN    0.9% sodium chloride infusion  75 mL/hr IntraVENous CONTINUOUS    ondansetron (ZOFRAN) injection 4 mg  4 mg IntraVENous Q6H PRN    insulin lispro (HUMALOG) injection   SubCUTAneous AC&HS    glucose chewable tablet 16 g  4 Tablet Oral PRN    glucagon (GLUCAGEN) injection 1 mg  1 mg IntraMUSCular PRN    dextrose 10% infusion 0-250 mL  0-250 mL IntraVENous PRN    pravastatin (PRAVACHOL) tablet 10 mg  10 mg Oral QHS    acetaminophen (TYLENOL) tablet 650 mg  650 mg Oral Q6H PRN Prior to Admission medications    Medication Sig Start Date End Date Taking? Authorizing Provider   SITagliptin-metFORMIN (Janumet) 50-1,000 mg per tablet Take 1 Tablet by mouth two (2) times daily (with meals). Yes Provider, Historical   trimethoprim-sulfamethoxazole (Bactrim DS) 160-800 mg per tablet Take 1 Tablet by mouth two (2) times a day. Yes Provider, Historical   losartan (COZAAR) 50 mg tablet Take 50 mg by mouth daily. Yes Other, MD Sharon   metFORMIN (GLUCOPHAGE) 850 mg tablet Take 1 Tab by mouth two (2) times daily (with meals). 4/14/17  Yes Alberto De Los Santos MD   Diabetic Supplies, Miscellan. kit 1 Each by Does Not Apply route daily. 4/14/17  Yes Alberto De Los Santos MD   multivitamin, tx-iron-ca-min (THERA-M W/ IRON) 9 mg iron-400 mcg tab tablet Take 1 Tab by mouth daily. Formulary substitution for DAILY MULTIVITAMIN   Yes Provider, Historical   butalbital-acetaminophen-caff (Fioricet) -40 mg per capsule Take 1 Capsule by mouth every six (6) hours as needed for Headache. Patient not taking: Reported on 10/17/2022 7/10/21   Esperanza Richard MD   meclizine (ANTIVERT) 25 mg tablet Take 1 tablet by mouth every 6 hours for the next 3 days. Then stop taking the meclizine. Restart the meclizine for 3 day intervals if vertigo/ dizziness returns. Patient not taking: Reported on 10/17/2022 7/10/21   Esperanza Richard MD   ondansetron (Zofran ODT) 4 mg disintegrating tablet 1 Tablet by SubLINGual route every eight (8) hours as needed for Nausea or Vomiting. Patient not taking: Reported on 10/17/2022 7/10/21   Esperanza Richard MD   amoxicillin-clavulanate (Augmentin) 875-125 mg per tablet Take 1 Tablet by mouth two (2) times a day. Patient not taking: Reported on 10/17/2022 7/10/21   Esperanza Richard MD   meclizine (ANTIVERT) 25 mg tablet Take 1 tablet by mouth every 6 hours for the next 3 days. Then stop taking the meclizine.   Restart the meclizine for 3 day intervals if vertigo/ dizziness returns. Patient not taking: Reported on 10/17/2022 7/10/21   Esperanza Richard MD   ketorolac (TORADOL) 10 mg tablet Take 1 Tablet by mouth every eight (8) hours as needed for Pain. Patient not taking: Reported on 10/17/2022 7/10/21   Esperanza Richard MD   PSEUDOEPHEDRINE-guaiFENesin [facility] D)  mg per tablet Take 1 Tab by mouth every twelve (12) hours. Patient not taking: Reported on 10/17/2022 1/20/20   MARYLU Harper   pravastatin (PRAVACHOL) 10 mg tablet Take  by mouth nightly. Patient not taking: Reported on 10/17/2022    Jet, MD Sharon   SITagliptin (JANUVIA) 25 mg tablet Take 25 mg by mouth daily. Patient not taking: Reported on 10/17/2022    Sharon Chaudhary MD   albuterol (VENTOLIN HFA) 90 mcg/actuation inhaler Take 2 Puffs by inhalation every six (6) hours as needed for Wheezing. 17   Alberto De Los Santos MD       No Known Allergies      Physical Exam:    Temp (24hrs), Av.1 °F (36.7 °C), Min:97.9 °F (36.6 °C), Max:98.2 °F (36.8 °C)    General: Alert , Oriented, in no distress  HEENT: no pallor, anicteric sclera, oral pharynx without lesion   No cervical, supraclavicular, axillary and inguinal lymphadenopathy palpated  Heart: regular rate, and rhythm, without murmur, gallop or rubbing  Lungs:breathing comfortably on room air, clear to auscultation and percussion bilaterally  ABD: bowel sound present, soft, nondistended, nontender, no hepatosplenomegaly or mass  Extremities: warm, well perfused, no edema  MSK: no tenderness along the spine or long bones  Skin: No rash  Neuro: non-focal      Imaging:  CT A/P:     1. No acute pathology within the abdomen or pelvis. 2. New diffuse marrow heterogeneity with new large lytic lesion within the left  iliac bone (approximately 7 cm) with cortical destruction both medially and  laterally. Potentially metastatic disease versus myeloma, is there a history of  malignancy?  Left iliac lesion would be amenable to percutaneous biopsy as  necessary. 3. Enlarged leiomyomatous appearance of the uterus with speckled calcification. Simple appearing right adnexal cyst measures approximately 58 mm in maximal  diameter, it is noted to be mobile, now posterior to the uterus, was previously  lateral to the uterus. No free pelvic fluid. 4. Hepatic steatosis. Thin-walled overdistended gallbladder.

## 2022-10-20 NOTE — PROGRESS NOTES
Hospitalist Progress Note    Patient: Estevan Cerda MRN: [id number]  CSN: [id number]    YOB: 1970  Age: 50 y.o. Sex: female    DOA: 10/17/2022 LOS:  LOS: 3 days          Chief Complaint:    ARF      Assessment/Plan       49 yo female with hypercalcemia, SHIVAM, HTN, NIDDM, and abd pain     1. Hypercalcemia. elevating again, pamidronate ordered  2. Iliac bone lesion. Suspicion for myeloma  3. Nausea and vomiting with abdominal pain. Constipation-add lactulose  4. Diabetes mellitus. 5.  Acute renal failure. improved  6. Severe anemia, transfuse to keep Hb >7     nephrology and oncology following    HTN-uncontrolled-add norvasc     Skeletal survey shows iliac bone lesion again as on the CT     S/p bone marrow biopsy     SPEP pending    D/c pena as renal function improved     Disposition :  Patient Active Problem List   Diagnosis Code    Asthma with acute exacerbation J45.901    Obesity E66.9    Type II diabetes mellitus (HCC) E11.9    Hypercalcemia of malignancy E83.52    SHIVAM (acute kidney injury) ([facility] [address] N17.9    Lytic bone lesions on xray M89.9       Subjective:  Feels constipated  Abd cramps  Discomfort  Weak  Has been eating [facility]      Review of systems:    Constitutional: denies fevers, chill  Respiratory: denies SOB, cough  Cardiovascular: denies chest pain, palpitations  Gastrointestinal: denies nausea, vomiting, but abd cramps and no BM yet      Vital signs/Intake and Output:  Visit Vitals  BP (!) 191/87 (BP 1 Location: Left upper arm, BP Patient Position: At rest)   Pulse (!) 56   Temp 97.9 °F (36.6 °C)   Resp 16   Wt 92.5 kg (204 lb)   SpO2 100%   Breastfeeding No   BMI 35.02 kg/m²     Current Shift:  No intake/output data recorded. Last three shifts:  10/18 1901 - 10/20 0700  In: 1140 [P.O.:240;  I.V.:900]  Out: 4650 [Urine:4650]    Exam:    General: Well developed, alert, NAD, OX3  CVS:Regular rate and rhythm, no M/R/G, S1/S2 heard, no thrill  Lungs:Clear to auscultation bilaterally, no wheezes, rhonchi, or rales  Abdomen: Soft, Nontender, No distention, Normal Bowel sounds  Extremities: No C/C/E, pulses palpable 2+  Neuro:grossly normal , follows commands  Psych:appropriate                Labs: Results:       Chemistry Recent Labs     10/20/22  0120 10/19/22  1047 10/18/22  2030 10/18/22  0852 10/18/22  0130   * 115*  --   --  87   * 138  --   --  135*   K 3.7 4.0  --   --  4.0    107  --   --  105   CO2 24 26  --   --  25   BUN 18 14  --   --  20*   CREA 1.31* 1.34*  --   --  1.68*   CA 12.0* 11.7* 11.3*   < > 10.8*  10.7*   AGAP 4 5  --   --  5   BUCR 14 10*  --   --  12   AP 51 46  --   --  40*   TP 12.8* 13.2*  --   --  11.8*   ALB 2.3* 2.5*  --   --  2.3*   GLOB 10.5* 10.7*  --   --  9.5*   AGRAT 0.2* 0.2*  --   --  0.2*    < > = values in this interval not displayed. CBC w/Diff Recent Labs     10/20/22  0120 10/19/22  0511 10/18/22  2030 10/18/22  0130   WBC 11.5 10.0  --  9.9   RBC 2.99* 2.83*  --  2.34*   HGB 8.7* 8.3* 8.4* 6.8*   HCT 26.4* 25.5* 25.6* 21.4*    208  --  200   GRANS 28* 28*  --  20*   LYMPH 61* 62*  --  77*   EOS 4 2  --  2      Cardiac Enzymes No results for input(s): CPK, CKND1, STORM in the last 72 hours. No lab exists for component: CKRMB, TROIP   Coagulation No results for input(s): PTP, INR, APTT, INREXT in the last 72 hours. Lipid Panel No results found for: CHOL, CHOLPOCT, CHOLX, CHLST, CHOLV, 518781, HDL, HDLP, LDL, LDLC, DLDLP, 578018, VLDLC, VLDL, TGLX, TRIGL, TRIGP, TGLPOCT, CHHD, CHHDX   BNP No results for input(s): BNPP in the last 72 hours.    Liver Enzymes Recent Labs     10/20/22  0120   TP 12.8*   ALB 2.3*   AP 51      Thyroid Studies Lab Results   Component Value Date/Time    TSH 6.57 (H) 10/17/2022 09:36 AM        Procedures/imaging: see electronic medical records for all procedures/Xrays and details which were not copied into this note but were reviewed prior to creation of Kahlil Ascencio MD

## 2022-10-20 NOTE — WOUND CARE
Wound Care Note:    Chart audited for low ellen score, patient with high risk for skin breakdown, no documented wounds at time of audit.      Skin Care & Pressure Relief Recommendations  Minimize layers of linen  Pads under patient to optimize support surface  Turn/reposition approximately every 2 hours  Pillow wedges  Manage incontinence   Promote continence; Skin Protective lotion/cream to buttocks and sacrum daily and as needed with incontinence care  Offload heels pillows    Consult wound care if any wounds/ skin care needs noted during admission

## 2022-10-20 NOTE — PROGRESS NOTES
RENAL CONSULT PROGRESS NOTE   10/20/2022    Patient:  Yolanda Hernandez  :  1970  Gender:  female  MRN #:  [id number]    Reason for Consult: Hypercalcemia and acute renal failure     Subjective:   Denied shortness of breath and chest pain, complaints of soreness and constipation   Urinating fine   No other symptoms     Objective:    Visit Vitals  BP (!) 166/83   Pulse (!) 56   Temp 98.2 °F (36.8 °C)   Resp 16   Wt 92.5 kg (204 lb)   SpO2 100%   Breastfeeding No   BMI 35.02 kg/m²       Physical Exam:    Pt awake,  alert   Lung: clear to auscultation  Ext: no edema   CNS- Oriented to time , place and person     Laboratory Data:    Lab Results   Component Value Date    BUN 18 10/20/2022    BUN 14 10/19/2022    BUN 20 (H) 10/18/2022     (L) 10/20/2022     10/19/2022     (L) 10/18/2022    CO2 24 10/20/2022    CO2 26 10/19/2022    CO2 25 10/18/2022     Lab Results   Component Value Date    WBC 11.5 10/20/2022    HGB 8.7 (L) 10/20/2022    HCT 26.4 (L) 10/20/2022     No components found for: CALCIUM, PHOSPHORUS, MAGNESIUM  No results found for: HDL  No results found for: SPECIMENTYP, TURBIDITY, UGLU    Imaging Reveiwed:    CT abdomen:pelvis- New diffuse marrow heterogeneity with new large lytic lesion within the left  iliac bone (approximately 7 cm) with cortical destruction both medially and  laterally. Potentially metastatic disease versus myeloma, is there a history of  malignancy? Left iliac lesion would be amenable to percutaneous biopsy as  necessary. 3. Enlarged leiomyomatous appearance of the uterus with speckled calcification. Simple appearing right adnexal cyst measures approximately 58 mm in maximal  diameter, it is noted to be mobile, now posterior to the uterus, was previously  lateral to the uterus. No free pelvic fluid.     Assessment:    Yolanda Hernandez is a 50y.o. year old female with ongoing Hypertension, Type 2 diabetes mellitus presented in ED with nausea/vomiting for last 2 days .  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 she has bone biopsy this morning     Acute Renal failure- Improving   Hypercalcemia- Fluctuating   Hypertension  Anemia       Plan:      Hypercalcemia-  serum calcium improved compared to admission but slowly up trending again   Has constipation-treatment per primary team   Decent urine output  Continue normal saline to 75 cc/hour , iv loop diuretics prn fr hypoxia/CHF symptoms   SPEP/KARLA, vitamin D level, PTH- suppressed , PTH related peptide- pending   Appreciate oncology assistance, agree with pamidronate 90 mg iv once as serum calcium is rising again    Intake and output, especially urine output       Acute renal failure: - Improving   Causes as above  Continue volume infusion   strict urine output recording   Intake and output  Avoid NSAIDS , contrast and nephrotoxin  Dose all meds for current eGFR  As of now no indication of dialysis       Hypertension:   Hold losartan  Consider amlodipine for hypertension      Anemia work up/so PRBC transfusion on 10/18                  I have reviewed patient's record for pertinent labs, radiology and other test . I have reviewed old records. I have ordered labs, medications and radiology as necessary and indicated per patient's condition . Total time spent is approximately 30 minutes. Greater than 50 % of that time was spent in counseling and or care coordination.          Beny Hernandez MD, MD  [facility].- Nephrology

## 2022-10-20 NOTE — PROGRESS NOTES
Bedside shift change report given to Marcie morton   (oncoming nurse) by Blanche Morley RN   (offgoing nurse). Report included the following information SBAR, Kardex, Intake/Output, MAR, and Recent Results.

## 2022-10-20 NOTE — PROGRESS NOTES
Pt lives alone and her daughter arrived yesterday to assist.  Pt does not have or use DME and drove herself to the hospital.  Pt states she has friends to assist if needed. Pt is currently pending further work up and results from biopsy to assist with identifying a care plan. Anticipate pt will transition home with physician follow up with in the next 24-48 hours.   CM to continue to follow and assist.      Care Management Interventions  Mode of Transport at Discharge: Self  Transition of Care Consult (CM Consult): Discharge Planning  Health Maintenance Reviewed: Yes  Support Systems: Friend/Neighbor  Confirm Follow Up Transport: Self  The Plan for Transition of Care is Related to the Following Treatment Goals : Home with physician follow up  Discharge Location  Patient Expects to be Discharged to[de-identified] Home

## 2022-10-21 VITALS
HEART RATE: 82 BPM | OXYGEN SATURATION: 98 % | TEMPERATURE: 98.2 F | BODY MASS INDEX: 35.02 KG/M2 | RESPIRATION RATE: 16 BRPM | SYSTOLIC BLOOD PRESSURE: 128 MMHG | WEIGHT: 204 LBS | DIASTOLIC BLOOD PRESSURE: 66 MMHG

## 2022-10-21 PROBLEM — C90.00 MYELOMA (HCC): Status: ACTIVE | Noted: 2022-10-21

## 2022-10-21 LAB
ANION GAP SERPL CALC-SCNC: 6 MMOL/L (ref 3–18)
BUN SERPL-MCNC: 15 MG/DL (ref 7–18)
BUN/CREAT SERPL: 14 (ref 12–20)
CALCIUM SERPL-MCNC: 11.2 MG/DL (ref 8.5–10.1)
CALCIUM SERPL-MCNC: 11.7 MG/DL (ref 8.5–10.1)
CHLORIDE SERPL-SCNC: 105 MMOL/L (ref 100–111)
CO2 SERPL-SCNC: 23 MMOL/L (ref 21–32)
CREAT SERPL-MCNC: 1.11 MG/DL (ref 0.6–1.3)
GLUCOSE BLD STRIP.AUTO-MCNC: 136 MG/DL (ref 70–110)
GLUCOSE BLD STRIP.AUTO-MCNC: 230 MG/DL (ref 70–110)
GLUCOSE SERPL-MCNC: 144 MG/DL (ref 74–99)
POTASSIUM SERPL-SCNC: 3.6 MMOL/L (ref 3.5–5.5)
SODIUM SERPL-SCNC: 134 MMOL/L (ref 136–145)

## 2022-10-21 PROCEDURE — 80048 BASIC METABOLIC PNL TOTAL CA: CPT

## 2022-10-21 PROCEDURE — 36415 COLL VENOUS BLD VENIPUNCTURE: CPT

## 2022-10-21 PROCEDURE — 74011250637 HC RX REV CODE- 250/637: Performed by: INTERNAL MEDICINE

## 2022-10-21 PROCEDURE — 74011250636 HC RX REV CODE- 250/636: Performed by: STUDENT IN AN ORGANIZED HEALTH CARE EDUCATION/TRAINING PROGRAM

## 2022-10-21 PROCEDURE — 74011250637 HC RX REV CODE- 250/637: Performed by: HOSPITALIST

## 2022-10-21 PROCEDURE — 74011636637 HC RX REV CODE- 636/637: Performed by: HOSPITALIST

## 2022-10-21 PROCEDURE — 82310 ASSAY OF CALCIUM: CPT

## 2022-10-21 PROCEDURE — 82962 GLUCOSE BLOOD TEST: CPT

## 2022-10-21 RX ORDER — AMLODIPINE BESYLATE 5 MG/1
5 TABLET ORAL DAILY
Qty: 30 TABLET | Refills: 1 | Status: SHIPPED | OUTPATIENT
Start: 2022-10-22

## 2022-10-21 RX ADMIN — ACETAMINOPHEN 650 MG: 325 TABLET, FILM COATED ORAL at 08:25

## 2022-10-21 RX ADMIN — SODIUM CHLORIDE 75 ML/HR: 9 INJECTION, SOLUTION INTRAVENOUS at 08:20

## 2022-10-21 RX ADMIN — POLYETHYLENE GLYCOL 3350 17 G: 17 POWDER, FOR SOLUTION ORAL at 08:25

## 2022-10-21 RX ADMIN — AMLODIPINE BESYLATE 5 MG: 5 TABLET ORAL at 08:25

## 2022-10-21 RX ADMIN — INSULIN LISPRO 4 UNITS: 100 INJECTION, SOLUTION INTRAVENOUS; SUBCUTANEOUS at 12:42

## 2022-10-21 RX ADMIN — DOCUSATE SODIUM 50 MG AND SENNOSIDES 8.6 MG 2 TABLET: 8.6; 5 TABLET, FILM COATED ORAL at 08:25

## 2022-10-21 NOTE — PROGRESS NOTES
Problem: Falls - Risk of  Goal: *Absence of Falls  Description: Document Lui Haley Fall Risk and appropriate interventions in the flowsheet. Outcome: Progressing Towards Goal  Note: Fall Risk Interventions:  Mobility Interventions: Strengthening exercises (ROM-active/passive), Utilize walker, cane, or other assistive device         Medication Interventions: Teach patient to arise slowly    Elimination Interventions: Call light in reach              Problem: Patient Education: Go to Patient Education Activity  Goal: Patient/Family Education  Outcome: Progressing Towards Goal     Problem: Diabetes Self-Management  Goal: *Disease process and treatment process  Description: Define diabetes and identify own type of diabetes; list 3 options for treating diabetes. Outcome: Progressing Towards Goal  Goal: *Incorporating nutritional management into lifestyle  Description: Describe effect of type, amount and timing of food on blood glucose; list 3 methods for planning meals. Outcome: Progressing Towards Goal  Goal: *Incorporating physical activity into lifestyle  Description: State effect of exercise on blood glucose levels. Outcome: Progressing Towards Goal  Goal: *Developing strategies to promote health/change behavior  Description: Define the ABC's of diabetes; identify appropriate screenings, schedule and personal plan for screenings. Outcome: Progressing Towards Goal  Goal: *Using medications safely  Description: State effect of diabetes medications on diabetes; name diabetes medication taking, action and side effects. Outcome: Progressing Towards Goal  Goal: *Monitoring blood glucose, interpreting and using results  Description: Identify recommended blood glucose targets  and personal targets.   Outcome: Progressing Towards Goal  Goal: *Prevention, detection, treatment of acute complications  Description: List symptoms of hyper- and hypoglycemia; describe how to treat low blood sugar and actions for lowering  high blood glucose level. Outcome: Progressing Towards Goal  Goal: *Prevention, detection and treatment of chronic complications  Description: Define the natural course of diabetes and describe the relationship of blood glucose levels to long term complications of diabetes. Outcome: Progressing Towards Goal  Goal: *Developing strategies to address psychosocial issues  Description: Describe feelings about living with diabetes; identify support needed and support network  Outcome: Progressing Towards Goal  Goal: *Insulin pump training  Outcome: Progressing Towards Goal  Goal: *Sick day guidelines  Outcome: Progressing Towards Goal  Goal: *Patient Specific Goal (EDIT GOAL, INSERT TEXT)  Outcome: Progressing Towards Goal     Problem: Patient Education: Go to Patient Education Activity  Goal: Patient/Family Education  Outcome: Progressing Towards Goal     Problem: Pain  Goal: *Control of Pain  Outcome: Progressing Towards Goal     Problem: Pressure Injury - Risk of  Goal: *Prevention of pressure injury  Description: Document Noel Scale and appropriate interventions in the flowsheet. Outcome: Progressing Towards Goal  Note: Pressure Injury Interventions:             Activity Interventions: Pressure redistribution bed/mattress(bed type), Increase time out of bed    Mobility Interventions: Pressure redistribution bed/mattress (bed type)    Nutrition Interventions: Document food/fluid/supplement intake    Friction and Shear Interventions: Minimize layers                Problem: Patient Education: Go to Patient Education Activity  Goal: Patient/Family Education  Outcome: Progressing Towards Goal

## 2022-10-21 NOTE — PROGRESS NOTES
Problem: Falls - Risk of  Goal: *Absence of Falls  Description: Document Yeny Zelaya Fall Risk and appropriate interventions in the flowsheet. Outcome: Progressing Towards Goal  Note: Fall Risk Interventions:  Mobility Interventions: Patient to call before getting OOB         Medication Interventions: Patient to call before getting OOB, Teach patient to arise slowly    Elimination Interventions: Patient to call for help with toileting needs              Problem: Patient Education: Go to Patient Education Activity  Goal: Patient/Family Education  Outcome: Progressing Towards Goal     Problem: Diabetes Self-Management  Goal: *Disease process and treatment process  Description: Define diabetes and identify own type of diabetes; list 3 options for treating diabetes. Outcome: Progressing Towards Goal  Goal: *Incorporating nutritional management into lifestyle  Description: Describe effect of type, amount and timing of food on blood glucose; list 3 methods for planning meals. Outcome: Progressing Towards Goal  Goal: *Incorporating physical activity into lifestyle  Description: State effect of exercise on blood glucose levels. Outcome: Progressing Towards Goal  Goal: *Developing strategies to promote health/change behavior  Description: Define the ABC's of diabetes; identify appropriate screenings, schedule and personal plan for screenings. Outcome: Progressing Towards Goal  Goal: *Using medications safely  Description: State effect of diabetes medications on diabetes; name diabetes medication taking, action and side effects. Outcome: Progressing Towards Goal  Goal: *Monitoring blood glucose, interpreting and using results  Description: Identify recommended blood glucose targets  and personal targets.   Outcome: Progressing Towards Goal  Goal: *Prevention, detection, treatment of acute complications  Description: List symptoms of hyper- and hypoglycemia; describe how to treat low blood sugar and actions for lowering high blood glucose level. Outcome: Progressing Towards Goal  Goal: *Prevention, detection and treatment of chronic complications  Description: Define the natural course of diabetes and describe the relationship of blood glucose levels to long term complications of diabetes. Outcome: Progressing Towards Goal  Goal: *Developing strategies to address psychosocial issues  Description: Describe feelings about living with diabetes; identify support needed and support network  Outcome: Progressing Towards Goal  Goal: *Insulin pump training  Outcome: Progressing Towards Goal  Goal: *Sick day guidelines  Outcome: Progressing Towards Goal  Goal: *Patient Specific Goal (EDIT GOAL, INSERT TEXT)  Outcome: Progressing Towards Goal     Problem: Patient Education: Go to Patient Education Activity  Goal: Patient/Family Education  Outcome: Progressing Towards Goal     Problem: Pain  Goal: *Control of Pain  Outcome: Progressing Towards Goal     Problem: Pressure Injury - Risk of  Goal: *Prevention of pressure injury  Description: Document Noel Scale and appropriate interventions in the flowsheet. Outcome: Progressing Towards Goal  Note: Pressure Injury Interventions:             Activity Interventions: Increase time out of bed    Mobility Interventions: Pressure redistribution bed/mattress (bed type)    Nutrition Interventions: Document food/fluid/supplement intake                     Problem: Patient Education: Go to Patient Education Activity  Goal: Patient/Family Education  Outcome: Progressing Towards Goal

## 2022-10-21 NOTE — PROGRESS NOTES
Hospitalist Progress Note    Patient: Preet Sweeney MRN: [id number]  CSN: [id number]    YOB: 1970  Age: 50 y.o. Sex: female    DOA: 10/17/2022 LOS:  LOS: 4 days          Chief Complaint:    SHIVAM      Assessment/Plan     51 yo female with hypercalcemia, SHIVAM, HTN, NIDDM, and abd pain     1. Hypercalcemia. due to myeloma  2. Iliac bone lesion. Suspicion for myeloma  3. Nausea and vomiting with abdominal pain. Constipation-add lactulose  4. Diabetes mellitus. 5.  Acute renal failure. improved  6. Severe anemia, transfuse to keep Hb >7    Testing confirms myeloma     nephrology and oncology following     HTN-uncontrolled-norvasc     S/p bone marrow biopsy    Monitor lytes    D/c planning with treatment plan being worked on    Disposition :  Patient Active Problem List   Diagnosis Code    Asthma with acute exacerbation J45.901    Obesity E66.9    Type II diabetes mellitus (HCC) E11.9    Hypercalcemia of malignancy E83.52    SHIVAM (acute kidney injury) ([address] N17.9    Lytic bone lesions on xray M89.9    Myeloma ([facility] [address] C90.00       Subjective:    No new events  No abd pain  eating    Review of systems:    Constitutional: denies fevers, chills  Respiratory: denies SOB, cough  Cardiovascular: denies chest pain  Gastrointestinal: denies nausea, vomiting, diarrhea      Vital signs/Intake and Output:  Visit Vitals  /66 (BP 1 Location: Left upper arm, BP Patient Position: At rest)   Pulse 82   Temp 100.1 °F (37.8 °C)   Resp 16   Wt 92.5 kg (204 lb)   SpO2 98%   Breastfeeding No   BMI 35.02 kg/m²     Current Shift:  No intake/output data recorded.   Last three shifts:  10/19 1901 - 10/21 0700  In: -   Out: 2450 [Urine:2450]    Exam:    General: Well developed, alert, NAD, OX3  CVS:Regular rate and rhythm, no M/R/G, S1/S2 heard, no thll  Lungs:Clear to auscultation bilaterally, no wheezes, rhonchi, or rales  Abdomen: Soft, Nontender, No distention, Normal Bowel sounds  Extremities: No C/C/E, pulses palpable 2+  Neuro:grossly normal , follows commands  Psych:appropriate                Labs: Results:       Chemistry Recent Labs     10/21/22  0215 10/20/22  1011 10/20/22  0120 10/19/22  1047   *  --  121* 115*   *  --  131* 138   K 3.6  --  3.7 4.0     --  103 107   CO2 23  --  24 26   BUN 15  --  18 14   CREA 1.11  --  1.31* 1.34*   CA 11.7* 12.1* 12.0* 11.7*   AGAP 6  --  4 5   BUCR 14  --  14 10*   AP  --   --  51 46   TP  --   --  12.8* 13.2*   ALB  --   --  2.3* 2.5*   GLOB  --   --  10.5* 10.7*   AGRAT  --   --  0.2* 0.2*      CBC w/Diff Recent Labs     10/20/22  0120 10/19/22  0511 10/18/22  2030   WBC 11.5 10.0  --    RBC 2.99* 2.83*  --    HGB 8.7* 8.3* 8.4*   HCT 26.4* 25.5* 25.6*    208  --    GRANS 28* 28*  --    LYMPH 61* 62*  --    EOS 4 2  --       Cardiac Enzymes No results for input(s): CPK, CKND1, STORM in the last 72 hours. No lab exists for component: CKRMB, TROIP   Coagulation No results for input(s): PTP, INR, APTT, INREXT, INREXT in the last 72 hours. Lipid Panel No results found for: CHOL, CHOLPOCT, CHOLX, CHLST, CHOLV, 457251, HDL, HDLP, LDL, LDLC, DLDLP, 998724, VLDLC, VLDL, TGLX, TRIGL, TRIGP, TGLPOCT, CHHD, CHHDX   BNP No results for input(s): BNPP in the last 72 hours.    Liver Enzymes Recent Labs     10/20/22  0120   TP 12.8*   ALB 2.3*   AP 51      Thyroid Studies Lab Results   Component Value Date/Time    TSH 6.57 (H) 10/17/2022 09:36 AM        Procedures/imaging: see electronic medical records for all procedures/Xrays and details which were not copied into this note but were reviewed prior to creation of Arnie Abraham MD

## 2022-10-21 NOTE — DISCHARGE SUMMARY
[address]    Name:  John Roman  MR#:   [id number]  :  1970  ACCOUNT #:  [de-identified]  ADMIT DATE:  10/17/2022  DISCHARGE DATE:  10/21/2022      DISCHARGE DIAGNOSES:  1. Acute kidney injury. 2.  Hypercalcemia. 3.  Multiple myeloma, new diagnosis. 4.  Hypertension. 5.  Diabetes mellitus. HOSPITAL SUMMARY:  This is a 61-year-old female who came in with abdominal pain, feeling poorly, nausea, vomiting, poor appetite and was discovered to have hypercalcemia, acute kidney injury. She had recently been on Bactrim for presumed UTI. When she came in, she required consultation with Nephrology. There was a lytic lesion noted on her ileum as well which prompted oncologic evaluation also. We sent testing for multiple myeloma, and she had a bone marrow biopsy done as well. Results are consistent with myeloma at this time, and she has followup scheduled for outpatient oncology evaluation and treatment discussions at this time. The patient's histologic section from her biopsy shows atypical cells with plasmacytoid morphology. This is consistent with a plasma cell neoplasm, and her protein electrophoresis is highly abnormal.  Fortunately, her acute kidney injury is resolved. She is not back on her ARB medication at this time, but creatinine is down to 1. 11. Sodium is 134, potassium 3.6, calcium is still running elevated in the 11 range, but will get better likely wants treatment is initiated for multiple myeloma. The patient has no signs or symptoms of infection here. She has been advised of all her results. Oncology has followed as well as Nephrology. Blood pressure medications have been adjusted. She has been followed from a diabetic standpoint. She does have a hemoglobin A1c of 7.3%. Her most recent blood sugars are between 136 and 213. She is looking much better. Abdomen pain is resolved. She has moved her bowels. She started to eat more.   Nausea and vomiting has resolved. She has had a low grade 100.6 temperature, but is likely due to myeloma. She is having no dysuria, cough, shortness of breath, nausea or vomiting at this time. On exam today, she looks markedly improved from when she did on admission, temperature is 100.1, pulse 82, blood pressure 128/66, respiratory rate 16, SaO2 is 98% on room air. Lungs are clear. Cardiac exam unchanged. Abdomen benign. Lower extremities without edema. I have discussed the plan with Nephrology, and I have noted Oncology's recommendations and follow u appointment for Monday, 10/24, that is with Dr. Mosqueda [facility], at 08:30 a.m. and she will go see Annita Mcmahon DO, her PCP on 10/31 at 10:20 a.m. The patient is stable for release from the hospital to rest through the weekend and follow up with Oncology Monday morning for discussion of further treatment options and further lab work as necessary. She will continue following meds for home, Norvasc 5 mg daily, albuterol inhaler 2 puffs every 4-6 hours as needed, multivitamin with iron once a day, Janumet 50/1000 one tablet twice daily and diabetic diet for home. She is not requiring any pain medications at this time. Followup is as noted. We have discussed the plan of care with both her and her daughter, and she is stable for release from the hospital.      Thirty six minutes on discharge time today.       Yamilet Escobedo MD      [id number]  D:  10/21/2022 12:30  T:  10/21/2022 12:59  JOB #:  [id number]  CC:  Annita Mcmahon DO

## 2022-10-21 NOTE — DISCHARGE INSTRUCTIONS
DISCHARGE SUMMARY from Nurse    PATIENT INSTRUCTIONS:    After general anesthesia or intravenous sedation, for 24 hours or while taking prescription Narcotics:  Limit your activities  Do not drive and operate hazardous machinery  Do not make important personal or business decisions  Do  not drink alcoholic beverages  If you have not urinated within 8 hours after discharge, please contact your surgeon on call. Report the following to your surgeon:  Excessive pain, swelling, redness or odor of or around the surgical area  Temperature over 100.5  Nausea and vomiting lasting longer than 4 hours or if unable to take medications  Any signs of decreased circulation or nerve impairment to extremity: change in color, persistent  numbness, tingling, coldness or increase pain  Any questions    What to do at Home:  Recommended activity: Activity as tolerated,     If you experience any of the following symptoms nausea, vomiting, abdominal pain or low back pain, shortness of breath, diarrhea, fever above 100.5, please follow up with ER or Primary Care. *  Please give a list of your current medications to your Primary Care Provider. *  Please update this list whenever your medications are discontinued, doses are      changed, or new medications (including over-the-counter products) are added. *  Please carry medication information at all times in case of emergency situations. These are general instructions for a healthy lifestyle:    No smoking/ No tobacco products/ Avoid exposure to second hand smoke  Surgeon General's Warning:  Quitting smoking now greatly reduces serious risk to your health. Obesity, smoking, and sedentary lifestyle greatly increases your risk for illness    A healthy diet, regular physical exercise & weight monitoring are important for maintaining a healthy lifestyle    The discharge information has been reviewed with the patient. The patient verbalized understanding.   Discharge medications reviewed with the patient and appropriate educational materials and side effects teaching were provided.   ___________________________________________________________________________________________________________________________________

## 2022-10-21 NOTE — PROGRESS NOTES
RENAL CONSULT PROGRESS NOTE   10/21/2022    Patient:  Lewis Tian  :  1970  Gender:  female  MRN #:  [id number]    Reason for Consult: Hypercalcemia and acute renal failure     Subjective:   Denied shortness of breath and chest pain,  Urinating fine   No other symptoms     Objective:    Visit Vitals  /78 (BP 1 Location: Left upper arm, BP Patient Position: At rest)   Pulse 80   Temp (!) 100.6 °F (38.1 °C)   Resp 16   Wt 92.5 kg (204 lb)   SpO2 98%   Breastfeeding No   BMI 35.02 kg/m²       Physical Exam:    Pt awake,  alert   Lung: clear to auscultation  Ext: no edema   CNS- Oriented to time , place and person     Laboratory Data:    Lab Results   Component Value Date    BUN 15 10/21/2022    BUN 18 10/20/2022    BUN 14 10/19/2022     (L) 10/21/2022     (L) 10/20/2022     10/19/2022    CO2 23 10/21/2022    CO2 24 10/20/2022    CO2 26 10/19/2022     Lab Results   Component Value Date    WBC 11.5 10/20/2022    HGB 8.7 (L) 10/20/2022    HCT 26.4 (L) 10/20/2022     No components found for: CALCIUM, PHOSPHORUS, MAGNESIUM  No results found for: HDL  No results found for: SPECIMENTYP, TURBIDITY, UGLU    Imaging Reveiwed:    CT abdomen:pelvis- New diffuse marrow heterogeneity with new large lytic lesion within the left  iliac bone (approximately 7 cm) with cortical destruction both medially and  laterally. Potentially metastatic disease versus myeloma, is there a history of  malignancy? Left iliac lesion would be amenable to percutaneous biopsy as  necessary. 3. Enlarged leiomyomatous appearance of the uterus with speckled calcification. Simple appearing right adnexal cyst measures approximately 58 mm in maximal  diameter, it is noted to be mobile, now posterior to the uterus, was previously  lateral to the uterus. No free pelvic fluid.     Assessment:    Lewis Tian is a 50y.o. year old female with ongoing Hypertension, Type 2 diabetes mellitus presented in ED with nausea/vomiting for last 2 days . 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 she has bone biopsy this morning     Acute Renal failure- Improved  Hypercalcemia- Fluctuating   Hypertension  Anemia       Plan:      Hypercalcemia-  serum calcium improved compared to admission but still elevated   Bone biopsy and SPEP/KARLA is consistent with multiple myeloma for which is Oncology is following   She received iv pamidronate on 10/20 which will help to lower calcium in 2-3 days   Treatment of multiple myeloma will correct hypercalcemia as well    As long as she is in house Continue normal saline to 75 cc/hour , iv loop diuretics prn for hypoxia/CHF symptoms   Intake and output, especially urine output       Acute renal failure: - Improved    Causes as above  Continue volume infusion   strict urine output recording   Intake and output  Avoid NSAIDS , contrast and nephrotoxin  Dose all meds for current eGFR      Hypertension:   Hold losartan  Consider amlodipine for hypertension      Anemia work up, transfusion prn to keep hemoglobin above 7 gram/dl     As serum calcium is improving and renal function improved . I will only follow  peripherally while she is in house . Please call us if you have any questions and concern   Follow up in office in 3-4 week after discharge. I have reviewed patient's record for pertinent labs, radiology and other test . I have reviewed old records.  I have ordered labs, medications and radiology as necessary and indicated per patient's condition . Total time spent is approximately 28 minutes. Greater than 50 % of that time was spent in counseling and or care coordination.          Claudene Drivers, MD, MD  [facility].- Nephrology

## 2022-10-22 NOTE — PROGRESS NOTES
Hematology / Oncology Initial Consult Note    Admit Date: 10/17/2022    Reason for Consult: Concern for Malignancy    Requesting Physician: Dr. Fadumo Willis    Assessment:     Aparna Rivera is a 50 y.o., BLACK/, female, who I have been asked to see for hypercalcemia and lytic lesion. Principal Problem:    Hypercalcemia of malignancy (10/17/2022)    Active Problems:    Type II diabetes mellitus ([facility] [address] (4/13/2017)      SHIVAM (acute kidney injury) ([facility] [address] (10/18/2022)      Lytic bone lesions on xray (10/18/2022)      Myeloma ([facility] [address] (10/21/2022)    Abdominal pain/Hypercalcemia: Labs with Ca 14.3 on admission. PTH low. PTHrP and myeloma labs in progress. S/p Pomidronate 90mg IV x1 on 10/20/22. Ca stable at 11.2  Multiple Myeloma:  large lytic lesion in iliac bone, anemia, SHIVAM, and large globulin gap suggestive of active myeloma. Bone survey with the L iliac lesion. S/p CT-guided bone bx- pending results. SPEP with IgG lambda M-spike 5.2. Briefly discussed next steps, but will see her Monday to discuss in detail  Normocytic Anemia: Hg stable at 8.7. B12, folate normal. Ferritin elevated. No hemolysis. Transfuse to maintain Hg>7  SHIVAM: followed by Nephrology. Cr improving    Plan:     - follow up CT-guided Bone bx of L iliac   - Discussed findings of MM and next steps  - She is scheduled to see me at  on Monday 10/24/22 to discuss chemo    Winter Wadsworth MD  [address]      History of Present Illness: Aparna Rivera is a 50 y.o. female with Diabetes, Hypertension, Hypercholesterolemia who presented to the ER with nausea x2-3 days. History obtained per chart review as patient was off epps. She noted having a colonoscopy a week ago with two polyps removed. After the procedure, she noted increasing abdominal pain that diminished her appetite.  Labs in the ED were significant for CA 14.3, Cr 3.49, Hg 8, and CT A/P with a large lytic lesion in the left iliac bone measuring 7cm with heterogenous appearing bone. Nephrology was consulted, was given IVF, and calcitonin. She denied any prior history of kidney dysfunction and Ca noted to improve after IVF. No acute overnight events  This am, she notes feeling better  S/p pamidronate yesterday and on iVF  Ca 11.2, stable  Discussed lab results c/w Multiple Myeloma  Notes family is in [facility], but wishes to start tx    Past Medical History:   Diagnosis Date    Asthma     Diabetes ([address]     High cholesterol     Hypertension        Past Surgical History:   Procedure Laterality Date    HX GYN      tubal ligation        No family history on file. Social History     Socioeconomic History    Marital status: SINGLE   Tobacco Use    Smoking status: Former     Types: Cigarettes     Quit date: 2015     Years since quittin.5    Smokeless tobacco: Never   Substance and Sexual Activity    Alcohol use: No    Drug use: No       No current facility-administered medications for this encounter. Current Outpatient Medications   Medication Sig    amLODIPine (NORVASC) 5 mg tablet Take 1 Tablet by mouth daily. SITagliptin-metFORMIN (Janumet) 50-1,000 mg per tablet Take 1 Tablet by mouth two (2) times daily (with meals). multivitamin, tx-iron-ca-min (THERA-M W/ IRON) 9 mg iron-400 mcg tab tablet Take 1 Tab by mouth daily. Formulary substitution for DAILY MULTIVITAMIN    albuterol (VENTOLIN HFA) 90 mcg/actuation inhaler Take 2 Puffs by inhalation every six (6) hours as needed for Wheezing. Prior to Admission medications    Medication Sig Start Date End Date Taking? Authorizing Provider   amLODIPine (NORVASC) 5 mg tablet Take 1 Tablet by mouth daily. 10/22/22  Yes Rafa Gu MD   SITagliptin-metFORMIN (Janumet) 50-1,000 mg per tablet Take 1 Tablet by mouth two (2) times daily (with meals). Yes Provider, Historical   multivitamin, tx-iron-ca-min (THERA-M W/ IRON) 9 mg iron-400 mcg tab tablet Take 1 Tab by mouth daily.  Formulary substitution for DAILY MULTIVITAMIN   Yes Provider, Historical   albuterol (VENTOLIN HFA) 90 mcg/actuation inhaler Take 2 Puffs by inhalation every six (6) hours as needed for Wheezing. 17   Faviola Villanueva MD       No Known Allergies      Physical Exam:    Temp (24hrs), Av.6 °F (37.6 °C), Min:98.2 °F (36.8 °C), Max:100.6 °F (38.1 °C)    General: Alert , Oriented, in no distress  HEENT: no pallor, anicteric sclera, oral pharynx without lesion   No cervical, supraclavicular, axillary and inguinal lymphadenopathy palpated  Heart: regular rate, and rhythm, without murmur, gallop or rubbing  Lungs:breathing comfortably on room air, clear to auscultation and percussion bilaterally  ABD: bowel sound present, soft, nondistended, nontender, no hepatosplenomegaly or mass  Extremities: warm, well perfused, no edema  MSK: no tenderness along the spine or long bones  Skin: No rash  Neuro: non-focal      Imaging:  CT A/P:     1. No acute pathology within the abdomen or pelvis. 2. New diffuse marrow heterogeneity with new large lytic lesion within the left  iliac bone (approximately 7 cm) with cortical destruction both medially and  laterally. Potentially metastatic disease versus myeloma, is there a history of  malignancy? Left iliac lesion would be amenable to percutaneous biopsy as  necessary. 3. Enlarged leiomyomatous appearance of the uterus with speckled calcification. Simple appearing right adnexal cyst measures approximately 58 mm in maximal  diameter, it is noted to be mobile, now posterior to the uterus, was previously  lateral to the uterus. No free pelvic fluid. 4. Hepatic steatosis. Thin-walled overdistended gallbladder.

## 2022-10-25 LAB — PTH RELATED PROT SERPL-SCNC: <2 PMOL/L
